# Patient Record
Sex: FEMALE | Race: WHITE | Employment: FULL TIME | URBAN - METROPOLITAN AREA
[De-identification: names, ages, dates, MRNs, and addresses within clinical notes are randomized per-mention and may not be internally consistent; named-entity substitution may affect disease eponyms.]

---

## 2017-01-11 ENCOUNTER — OFFICE VISIT (OUTPATIENT)
Dept: ORTHOPEDIC SURGERY | Age: 42
End: 2017-01-11

## 2017-01-11 VITALS
WEIGHT: 146 LBS | HEIGHT: 68 IN | BODY MASS INDEX: 22.13 KG/M2 | SYSTOLIC BLOOD PRESSURE: 124 MMHG | DIASTOLIC BLOOD PRESSURE: 90 MMHG

## 2017-01-11 DIAGNOSIS — M24.159 LABRAL TEAR OF HIP, DEGENERATIVE: ICD-10-CM

## 2017-01-11 DIAGNOSIS — M25.551 PAIN OF RIGHT HIP JOINT: Primary | ICD-10-CM

## 2017-01-11 PROCEDURE — 99213 OFFICE O/P EST LOW 20 MIN: CPT | Performed by: ORTHOPAEDIC SURGERY

## 2017-01-11 PROCEDURE — 20611 DRAIN/INJ JOINT/BURSA W/US: CPT | Performed by: INTERNAL MEDICINE

## 2017-01-11 PROCEDURE — 99242 OFF/OP CONSLTJ NEW/EST SF 20: CPT | Performed by: INTERNAL MEDICINE

## 2017-01-12 DIAGNOSIS — R11.0 NAUSEA: Primary | ICD-10-CM

## 2017-01-12 RX ORDER — ONDANSETRON 4 MG/1
4 TABLET, FILM COATED ORAL DAILY PRN
Qty: 20 TABLET | Refills: 0 | OUTPATIENT
Start: 2017-01-12 | End: 2017-08-21

## 2017-01-13 ENCOUNTER — HOSPITAL ENCOUNTER (OUTPATIENT)
Dept: PHYSICAL THERAPY | Age: 42
Discharge: HOME | End: 2017-01-31
Admitting: ORTHOPAEDIC SURGERY

## 2017-01-13 PROBLEM — M24.159 LABRAL TEAR OF HIP, DEGENERATIVE: Status: ACTIVE | Noted: 2017-01-13

## 2017-02-08 ENCOUNTER — OFFICE VISIT (OUTPATIENT)
Dept: ORTHOPEDIC SURGERY | Age: 42
End: 2017-02-08

## 2017-02-08 VITALS — HEIGHT: 68 IN | BODY MASS INDEX: 22.13 KG/M2 | WEIGHT: 146 LBS

## 2017-02-08 DIAGNOSIS — M24.159 LABRAL TEAR OF HIP, DEGENERATIVE: Primary | ICD-10-CM

## 2017-02-08 PROCEDURE — 99213 OFFICE O/P EST LOW 20 MIN: CPT | Performed by: ORTHOPAEDIC SURGERY

## 2017-08-21 ENCOUNTER — OFFICE VISIT (OUTPATIENT)
Dept: FAMILY MEDICINE CLINIC | Age: 42
End: 2017-08-21

## 2017-08-21 VITALS
DIASTOLIC BLOOD PRESSURE: 80 MMHG | BODY MASS INDEX: 21.74 KG/M2 | TEMPERATURE: 97.9 F | SYSTOLIC BLOOD PRESSURE: 118 MMHG | WEIGHT: 143 LBS

## 2017-08-21 DIAGNOSIS — F41.9 ANXIETY: ICD-10-CM

## 2017-08-21 DIAGNOSIS — R30.0 DYSURIA: Primary | ICD-10-CM

## 2017-08-21 DIAGNOSIS — R39.15 URINARY URGENCY: ICD-10-CM

## 2017-08-21 DIAGNOSIS — R31.9 HEMATURIA: ICD-10-CM

## 2017-08-21 LAB
BACTERIA URINE, POC: 0
BILIRUBIN URINE: 0 MG/DL
BLOOD, URINE: NEGATIVE
CASTS URINE, POC: 0
CLARITY: CLEAR
COLOR: YELLOW
CRYSTALS URINE, POC: 0
EPI CELLS URINE, POC: 0
GLUCOSE URINE: 0
KETONES, URINE: NEGATIVE
LEUKOCYTE EST, POC: NORMAL
NITRITE, URINE: NEGATIVE
PH UA: 7.5 (ref 4.5–8)
PROTEIN UA: NEGATIVE
RBC URINE, POC: 0
SPECIFIC GRAVITY UA: 1.01 (ref 1–1.03)
UROBILINOGEN, URINE: NORMAL
WBC URINE, POC: NORMAL
YEAST URINE, POC: 0

## 2017-08-21 PROCEDURE — 81000 URINALYSIS NONAUTO W/SCOPE: CPT | Performed by: PHYSICIAN ASSISTANT

## 2017-08-21 PROCEDURE — 99214 OFFICE O/P EST MOD 30 MIN: CPT | Performed by: PHYSICIAN ASSISTANT

## 2017-08-21 RX ORDER — ALPRAZOLAM 0.5 MG/1
TABLET ORAL
Qty: 30 TABLET | Refills: 2 | Status: SHIPPED | OUTPATIENT
Start: 2017-08-21 | End: 2018-06-25 | Stop reason: SDUPTHER

## 2017-08-21 RX ORDER — CIPROFLOXACIN 500 MG/1
500 TABLET, FILM COATED ORAL 2 TIMES DAILY
Qty: 6 TABLET | Refills: 0 | Status: SHIPPED | OUTPATIENT
Start: 2017-08-21 | End: 2017-08-24

## 2017-08-21 ASSESSMENT — ENCOUNTER SYMPTOMS
SHORTNESS OF BREATH: 0
BACK PAIN: 0
DIARRHEA: 0
VOMITING: 0
NAUSEA: 0
CONSTIPATION: 0
ABDOMINAL PAIN: 0

## 2017-08-23 LAB
ORGANISM: ABNORMAL
URINE CULTURE, ROUTINE: ABNORMAL

## 2017-10-09 ENCOUNTER — OFFICE VISIT (OUTPATIENT)
Dept: ORTHOPEDIC SURGERY | Age: 42
End: 2017-10-09

## 2017-10-09 VITALS — HEIGHT: 68 IN | WEIGHT: 140 LBS | BODY MASS INDEX: 21.22 KG/M2

## 2017-10-09 DIAGNOSIS — S92.502A CLOSED FRACTURE OF FOURTH TOE OF LEFT FOOT, INITIAL ENCOUNTER: Primary | ICD-10-CM

## 2017-10-09 PROCEDURE — 99203 OFFICE O/P NEW LOW 30 MIN: CPT | Performed by: PODIATRIST

## 2017-10-09 NOTE — PROGRESS NOTES
HISTORY OF PRESENT ILLNESS: This is an initial visit for a 65-year-old female with a chief complaint of pain to the left 4th toe. This a work-related injury sustained on 9/20/2017. 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 One of the students pushed a desk over and landed directly onto her toe. There is pain with pressure, especially with walking. This is relieved mainly with getting off of the foot. FAMILY HISTORY:  Documented in chart. SOCIAL HISTORY:  Documented in chart. REVIEW OF SYSTEMS: The patient denies any problems with cardiovascular, pulmonary, gastrointestinal, neurologic, urologic, genitourinary, psychiatric, dermatologic, and HEENT systems. PHYSICAL EXAM:  There is mild edema to the left 4th digit. The majority of the palpable tenderness is at the distal tip of the left 4th digit. She also has numbness to the end of the toe. No open lesions or fracture blisters are noted. The pedal pulses are palpable and the sensation is grossly intact bilateral.  The remainder of the exam is unremarkable. X-RAYS:  Three on weightbearing views of the left foot were reviewed. A non-comminuted slightly displaced fracture of the distal tip of the left 4th digit distal phalanx is noted. ASSESSMENT:  4th digit  fracture, left foot    PLAN:  I educated the patient on the pathology and its treatment options. The x-rays were reviewed with the patient. She will continue with the postop shoe for as long as necessary. She can try regular shoes as tolerated. I demonstrated with 1\" Coban, how to apply a compressive dressing to the toe and foot to manage swelling. Rest, ice, and elevation will be used to reduce pain. Overall activity is to be decreased to control pain. Pain with this activity can be expected for up to 4-6 weeks. Activity can increase as tolerated once the initial pain goes down.     Delayed union and non-union of the fracture was discussed. I extended her light duty restrictions for another 3 weeks. I will see her back in 3 weeks.

## 2017-11-01 ENCOUNTER — OFFICE VISIT (OUTPATIENT)
Dept: ORTHOPEDIC SURGERY | Age: 42
End: 2017-11-01

## 2017-11-01 VITALS
SYSTOLIC BLOOD PRESSURE: 139 MMHG | HEART RATE: 78 BPM | WEIGHT: 139.99 LBS | HEIGHT: 68 IN | BODY MASS INDEX: 21.22 KG/M2 | DIASTOLIC BLOOD PRESSURE: 86 MMHG

## 2017-11-01 DIAGNOSIS — S92.502S: Primary | ICD-10-CM

## 2017-11-01 PROCEDURE — 99213 OFFICE O/P EST LOW 20 MIN: CPT | Performed by: PODIATRIST

## 2017-11-01 RX ORDER — IBUPROFEN 400 MG/1
TABLET ORAL
Refills: 0 | COMMUNITY
Start: 2017-09-21 | End: 2017-11-27

## 2017-11-27 ENCOUNTER — OFFICE VISIT (OUTPATIENT)
Dept: FAMILY MEDICINE CLINIC | Age: 42
End: 2017-11-27

## 2017-11-27 VITALS
HEART RATE: 74 BPM | BODY MASS INDEX: 22.7 KG/M2 | SYSTOLIC BLOOD PRESSURE: 112 MMHG | RESPIRATION RATE: 12 BRPM | OXYGEN SATURATION: 97 % | DIASTOLIC BLOOD PRESSURE: 80 MMHG | WEIGHT: 149.25 LBS

## 2017-11-27 DIAGNOSIS — Z00.00 WELL ADULT EXAM: Primary | ICD-10-CM

## 2017-11-27 DIAGNOSIS — Z23 NEED FOR INFLUENZA VACCINATION: ICD-10-CM

## 2017-11-27 DIAGNOSIS — F51.01 PRIMARY INSOMNIA: ICD-10-CM

## 2017-11-27 PROBLEM — S92.502A CLOSED FRACTURE OF FOURTH TOE OF LEFT FOOT: Status: RESOLVED | Noted: 2017-10-09 | Resolved: 2017-11-27

## 2017-11-27 PROCEDURE — 99396 PREV VISIT EST AGE 40-64: CPT | Performed by: FAMILY MEDICINE

## 2017-11-27 PROCEDURE — 90630 INFLUENZA, QUADV, 18-64 YRS, ID, PF, MICRO INJ, 0.1ML (FLUZONE QUADV, PF): CPT | Performed by: FAMILY MEDICINE

## 2017-11-27 PROCEDURE — 90471 IMMUNIZATION ADMIN: CPT | Performed by: FAMILY MEDICINE

## 2017-11-27 ASSESSMENT — PATIENT HEALTH QUESTIONNAIRE - PHQ9
2. FEELING DOWN, DEPRESSED OR HOPELESS: 0
SUM OF ALL RESPONSES TO PHQ QUESTIONS 1-9: 0
SUM OF ALL RESPONSES TO PHQ9 QUESTIONS 1 & 2: 0
1. LITTLE INTEREST OR PLEASURE IN DOING THINGS: 0

## 2017-11-27 NOTE — PATIENT INSTRUCTIONS
Patient Education        Well Visit, Ages 25 to 48: Care Instructions  Your Care Instructions  Physical exams can help you stay healthy. Your doctor has checked your overall health and may have suggested ways to take good care of yourself. He or she also may have recommended tests. At home, you can help prevent illness with healthy eating, regular exercise, and other steps. Follow-up care is a key part of your treatment and safety. Be sure to make and go to all appointments, and call your doctor if you are having problems. It's also a good idea to know your test results and keep a list of the medicines you take. How can you care for yourself at home? · Reach and stay at a healthy weight. This will lower your risk for many problems, such as obesity, diabetes, heart disease, and high blood pressure. · Get at least 30 minutes of physical activity on most days of the week. Walking is a good choice. You also may want to do other activities, such as running, swimming, cycling, or playing tennis or team sports. Discuss any changes in your exercise program with your doctor. · Do not smoke or allow others to smoke around you. If you need help quitting, talk to your doctor about stop-smoking programs and medicines. These can increase your chances of quitting for good. · Talk to your doctor about whether you have any risk factors for sexually transmitted infections (STIs). Having one sex partner (who does not have STIs and does not have sex with anyone else) is a good way to avoid these infections. · Use birth control if you do not want to have children at this time. Talk with your doctor about the choices available and what might be best for you. · Protect your skin from too much sun. When you're outdoors from 10 a.m. to 4 p.m., stay in the shade or cover up with clothing and a hat with a wide brim. Wear sunglasses that block UV rays.  Even when it's cloudy, put broad-spectrum sunscreen (SPF 30 or higher) on any condoms. For men  · Tests for sexually transmitted infections (STIs). Ask whether you should have tests for STIs. You may be at risk if you have sex with more than one person, especially if you do not wear a condom. · Testicular cancer exam. Ask your doctor whether you should check your testicles regularly. · Prostate exam. Talk to your doctor about whether you should have a blood test (called a PSA test) for prostate cancer. Experts differ on whether and when men should have this test. Some experts suggest it if you are older than 39 and are -American or have a father or brother who got prostate cancer when he was younger than 72. When should you call for help? Watch closely for changes in your health, and be sure to contact your doctor if you have any problems or symptoms that concern you. Where can you learn more? Go to [email or web address] and sign in to your [employer] account. Enter P072 in the [employer] box to learn more about \"Well Visit, Ages 25 to 48: Care Instructions. \"     If you do not have an account, please click on the \"Sign Up Now\" link. Current as of: July 19, 2016  Content Version: 11.3  © 9025-5045 [employer]. Care instructions adapted under license by [facility] ([facility]). If you have questions about a medical condition or this instruction, always ask your healthcare professional. [address]  any warranty or liability for your use of this information.

## 2017-11-27 NOTE — PROGRESS NOTES
History and Physical      Noa Garrison  YOB: 1975    Date of Service:  11/27/2017    Chief Complaint:   Noa Garrison is a 36 y.o. female who presents for complete physical examination. HPI: Pt will like to have her toes look at and discuss sinus issues. Patient been her care of orthopedic specialist for 2 separate issues over the last year. She still having discomfort in her right hip but at this point time does not want pursue any surgical intervention. Notes from orthopedic specialist for review with patient doing a chronic labral tear. She also had a toe fracture of the left foot and she feels this is improved. She was concerned that she has abnormalities of her toenails. Patient has not been exercising much in the last several months. Patient still using Xanax on an intermittent basis for insomnia. She's gone through 60 pills in the last year.     Wt Readings from Last 3 Encounters:   11/27/17 149 lb 4 oz (67.7 kg)   11/01/17 139 lb 15.9 oz (63.5 kg)   10/09/17 140 lb (63.5 kg)     BP Readings from Last 3 Encounters:   11/27/17 112/80   11/01/17 139/86   08/21/17 118/80       Patient Active Problem List   Diagnosis    Anxiety    Pain of right hip joint    Labral tear of hip, degenerative    Closed fracture of fourth toe of left foot       Preventive Care:  Health Maintenance   Topic Date Due    HIV screen  05/16/1989    Flu vaccine (1) 09/01/2017    Lipid screen  11/23/2021    DTaP/Tdap/Td vaccine (2 - Td) 09/08/2024      Hx abnormal PAP: no  Sexual activity: single partner, contraception - none   Self-breast exams: yes  Previous DEXA scan: no  Last eye exam: 2016, normal  Exercise: no regular exercise  Seatbelt use: y  Lipid panel:   Lab Results   Component Value Date    CHOL 216 (H) 11/23/2016    TRIG 105 11/23/2016    HDL 55 11/23/2016    LDLCALC 140 (H) 11/23/2016        Living will:No    Immunization History   Administered Date(s) Administered    Influenza Virus Vaccine 10/25/2010    Influenza, Intradermal, Preservative free 01/10/2013, 11/05/2013, 11/03/2015    Influenza, Intradermal, Quadrivalent, Preservative Free 11/23/2016    PPD Test 05/26/2009    Tdap (Boostrix, Adacel) 09/08/2014       No Known Allergies  Outpatient Prescriptions Marked as Taking for the 11/27/17 encounter (Office Visit) with Paul Gracia MD   Medication Sig Dispense Refill    ALPRAZolam (XANAX) 0.5 MG tablet 1 tab po qhs prn 30 tablet 2       Past Medical History:   Diagnosis Date    Anxiety     Closed fracture of fourth toe of left foot 10/9/2017    IBS (irritable bowel syndrome)     Tobacco use disorder      Past Surgical History:   Procedure Laterality Date    HYSTERECTOMY, VAGINAL  6/2010    OTHER SURGICAL HISTORY      uterine mobilization: fibroids     Family History   Problem Relation Age of Onset    Diabetes Mother     High Blood Pressure Mother     Other Father      Pulmonary embolism, DVT's    Cancer Maternal Aunt      brain    Cancer Maternal Grandmother      brain    No Known Problems Maternal Grandfather     No Known Problems Paternal Grandmother     No Known Problems Paternal Grandfather      Social History     Social History    Marital status:      Spouse name: N/A    Number of children: N/A    Years of education: N/A     Occupational History    Not on file. Social History Main Topics    Smoking status: Former Smoker     Packs/day: 0.50     Quit date: 9/13/2014    Smokeless tobacco: Never Used    Alcohol use 0.0 oz/week      Comment: social    Drug use: No    Sexual activity: Not on file     Other Topics Concern    Not on file     Social History Narrative    No narrative on file       Review of Systems:  A comprehensive review of systems was negative except for what was noted in the HPI.      Physical Exam:   Vitals:    11/27/17 0737   BP: 112/80   Site: Left Arm   Position: Sitting   Cuff Size: Medium Adult   Pulse: 74   Resp: 12   SpO2: 97% PF)    Primary insomnia     OARRS report checked      Pt appears stable & labs ordered. Forms will need to be completed for her insurance plan once her laboratory evaluation is available. Advised no treatment required of the toenails. Maintain Xanax on a when necessary basis. Health maintenance is up-to-date. Patient received counseling on the following healthy behaviors: nutrition and resume exercise     Patient given educational materials     Discussed use, benefit, and side effects of prescribed medications. Barriers to medication compliance addressed. All patient questions answered. Pt voiced understanding. Patient needs RTC in one year or sooner if she needs her Xanax refilled. Please note that this chart was generated using Dragon dictation software. Although every effort was made to ensure the accuracy of this automated transcription, some errors in transcription may have occurred.

## 2017-11-27 NOTE — PROGRESS NOTES
Vaccine Information Sheet, \"Influenza - Inactivated\"  given to Elmer Maldonado, or parent/legal guardian of  Elmer Maldonado and verbalized understanding. Patient responses:    Have you ever had a reaction to a flu vaccine? No  Are you able to eat eggs without adverse effects? Yes  Do you have any current illness? No  Have you ever had Guillian De Kal[place] Syndrome? No    Flu vaccine given per order. Please see immunization tab.

## 2018-06-25 ENCOUNTER — OFFICE VISIT (OUTPATIENT)
Dept: FAMILY MEDICINE CLINIC | Age: 43
End: 2018-06-25

## 2018-06-25 VITALS
HEART RATE: 95 BPM | WEIGHT: 142 LBS | SYSTOLIC BLOOD PRESSURE: 100 MMHG | BODY MASS INDEX: 21.6 KG/M2 | OXYGEN SATURATION: 98 % | DIASTOLIC BLOOD PRESSURE: 72 MMHG

## 2018-06-25 DIAGNOSIS — F51.01 PRIMARY INSOMNIA: ICD-10-CM

## 2018-06-25 DIAGNOSIS — Z11.1 SCREENING-PULMONARY TB: ICD-10-CM

## 2018-06-25 DIAGNOSIS — N95.1 HOT FLASHES DUE TO MENOPAUSE: Primary | ICD-10-CM

## 2018-06-25 PROCEDURE — 99213 OFFICE O/P EST LOW 20 MIN: CPT | Performed by: FAMILY MEDICINE

## 2018-06-25 PROCEDURE — 86580 TB INTRADERMAL TEST: CPT | Performed by: FAMILY MEDICINE

## 2018-06-25 RX ORDER — ALPRAZOLAM 0.5 MG/1
0.5 TABLET ORAL NIGHTLY PRN
Qty: 30 TABLET | Refills: 1 | Status: SHIPPED | OUTPATIENT
Start: 2018-06-25 | End: 2019-05-02 | Stop reason: SDUPTHER

## 2018-06-27 ENCOUNTER — NURSE ONLY (OUTPATIENT)
Dept: FAMILY MEDICINE CLINIC | Age: 43
End: 2018-06-27

## 2018-06-27 LAB
INDURATION: NORMAL
TB SKIN TEST: NEGATIVE

## 2019-05-02 ENCOUNTER — OFFICE VISIT (OUTPATIENT)
Dept: FAMILY MEDICINE CLINIC | Age: 44
End: 2019-05-02
Payer: COMMERCIAL

## 2019-05-02 VITALS
HEIGHT: 68 IN | OXYGEN SATURATION: 98 % | HEART RATE: 82 BPM | DIASTOLIC BLOOD PRESSURE: 70 MMHG | SYSTOLIC BLOOD PRESSURE: 120 MMHG | BODY MASS INDEX: 22.88 KG/M2 | WEIGHT: 151 LBS

## 2019-05-02 DIAGNOSIS — N95.1 HOT FLASHES DUE TO MENOPAUSE: Primary | ICD-10-CM

## 2019-05-02 DIAGNOSIS — F51.01 PRIMARY INSOMNIA: ICD-10-CM

## 2019-05-02 DIAGNOSIS — Z12.39 SCREENING FOR MALIGNANT NEOPLASM OF BREAST: ICD-10-CM

## 2019-05-02 PROCEDURE — 99213 OFFICE O/P EST LOW 20 MIN: CPT | Performed by: FAMILY MEDICINE

## 2019-05-02 RX ORDER — ALPRAZOLAM 0.5 MG/1
0.5 TABLET ORAL NIGHTLY PRN
Qty: 30 TABLET | Refills: 0 | Status: SHIPPED | OUTPATIENT
Start: 2019-05-02 | End: 2021-11-19 | Stop reason: SDUPTHER

## 2019-05-02 RX ORDER — ESTRADIOL 1 MG/1
1 TABLET ORAL DAILY
Qty: 30 TABLET | Refills: 3 | Status: SHIPPED | OUTPATIENT
Start: 2019-05-02 | End: 2019-09-03 | Stop reason: SDUPTHER

## 2019-05-02 ASSESSMENT — PATIENT HEALTH QUESTIONNAIRE - PHQ9
SUM OF ALL RESPONSES TO PHQ QUESTIONS 1-9: 0
2. FEELING DOWN, DEPRESSED OR HOPELESS: 0
1. LITTLE INTEREST OR PLEASURE IN DOING THINGS: 0
SUM OF ALL RESPONSES TO PHQ QUESTIONS 1-9: 0
SUM OF ALL RESPONSES TO PHQ9 QUESTIONS 1 & 2: 0

## 2019-05-02 NOTE — PROGRESS NOTES
Subjective:      Patient ID: Marivel Dobsb is a 39 y.o. female. HPI patient presents today for hot flashes. Here for hot flashes. Seen last June. Tried estroven and non rhythmic music. Lenelkin Hand seemed to be helping until past 2 months and now hot flashes are horrible, waking up at night multiple times, soaking bed sheets, during day every few hours with sx. Sweating, face flushed, etc. Uncomfortable and embarrassing as everyone comments on her red face and chest. States can't take it anymore. Would also like RF on alprazolam, uses very rarely for sleep when can't get brain to turn off, no SE. Patient's medications, allergies, past medical, surgical, social and family histories were reviewed and updated in the EHR as appropriate. Father had blood clots but no one else in family. No hx breast cancer. Review of Systems    Objective:   Physical Exam    Vitals:    05/02/19 1123   BP: 120/70   Site: Left Upper Arm   Position: Sitting   Cuff Size: Medium Adult   Pulse: 82   SpO2: 98%   Weight: 151 lb (68.5 kg)   Height: 5' 7.75\" (1.721 m)     Wt Readings from Last 3 Encounters:   05/02/19 151 lb (68.5 kg)   06/25/18 142 lb (64.4 kg)   11/27/17 149 lb 4 oz (67.7 kg)     Body mass index is 23.13 kg/m². PHQ-9 Total Score: 0 (5/2/2019 11:26 AM)      GEN: Alert and oriented x 4 NAD, affect appropriate and normal appearing weight, well hydrated, well developed. Assessment:          ASSESSMENT AND PLAN:       Andrea Casiano was seen today for menopause. Diagnoses and all orders for this visit:    Hot flashes due to menopause  Trial estrace  Discussed other options for tx such as effexor, clonidine  Discussed SE (blood clots, breast cancer, CAD)   Discussed given no uterus can do estrogen alone which is lower risk  Discussed using lowest dose possible for shortest time possible  Call in few weeks with update on sx  GET MAMMOGRAM!    Primary insomnia  -     ALPRAZolam (XANAX) 0.5 MG tablet;  Take 1 tablet by mouth nightly as needed for Sleep for up to 30 days. Uses very rarely, last filled in June 2018 #30  Controlled Substances Monitoring:     Attestation: The Prescription Monitoring Report for this patient was reviewed today. Andrew Cooper MD)  Chronic Pain Routine Monitoring: No signs of potential drug abuse or diversion identified: otherwise, see note documentation, Possible medication side effects, risk of tolerance/dependence & alternative treatments discussed. Andrew Cooper MD)        Screening for malignant neoplasm of breast  -     LALITO DIGITAL SCREEN W CAD BILATERAL; Future    Other orders  -     estradiol (ESTRACE) 1 MG tablet; Take 1 tablet by mouth daily    More than 50% of today's 15 minute encounter was spent counseling the patient/coordinating care of the patient regarding her hot flashes and treatment options as above  .

## 2019-05-02 NOTE — PATIENT INSTRUCTIONS
Patient Education        esterified estrogens  Pronunciation:  ess TER ih fied ESS ashlynwyatt Barillas  Brand:  Menest  What is the most important information I should know about esterified estrogens? Do not use if you are pregnant. You should not take esterified estrogens if you have any of the following conditions: unusual vaginal bleeding, a blood-clotting disorder, breast cancer (unless you are taking this medicine for breast cancer symptoms), or if you have ever had thyroid cancer or uterine cancer, or a blood clot caused by taking hormones. Esterified estrogens may increase your risk of developing a condition that may lead to uterine cancer. Call your doctor at once if you have any unusual vaginal bleeding while using this medicine. What are esterified estrogens? Estrogen is a female sex hormone produced by the ovaries. Estrogen is necessary for many processes in the body. Esterified estrogens are a man-made mixture of estrogens that are used to treat symptoms of menopause such as hot flashes, vaginal dryness, vaginal burning or irritation, or other hormonal changes in the vagina. This medicine is also used to replace estrogen in women with ovarian failure or other conditions that cause a lack of natural estrogen in the body. In some cases, esterified estrogens is used to treat symptoms of breast cancer in men and women. Esterified estrogens is also used in men to treat symptoms of advanced prostate cancer. Esterified estrogens treats only the symptoms of cancer but does not treat the cancer itself. Esterified estrogens may also be used for purposes not listed in this medication guide. What should I discuss with my healthcare provider before taking esterified estrogens? This medicine can harm an unborn baby or cause birth defects. Do not use if you are pregnant, and tell your doctor right away if you become pregnant.   You should not use esterified estrogens if you are allergic to it, if you are pregnant, or if you have:  · unusual vaginal bleeding that a doctor has not checked;  · a bleeding or blood-clotting disorder;  · breast cancer (unless you are taking esterified estrogens to treat breast cancer symptoms);  · a history of hormone-dependent cancer (such as breast, uterine, ovarian, or thyroid cancer); or  · a history of blood clots caused by taking birth control pills or hormone replacement medicine. Taking this medicine can increase your risk of blood clots, stroke, or heart attack, especially if you have certain other conditions. To make sure esterified estrogens is safe for you, tell your doctor if you have:  · heart disease (coronary artery disease, chest pain, history of heart attack, stroke, or blood clot);  · gallbladder disease;  · a history of depression;  · liver disease;  · a history of fibrocystic breast disease, lumps, nodules, or an abnormal mammogram;  · a family history of breast cancer;  · diabetes;  · kidney disease;  · epilepsy;  · migraines;  · a history of jaundice caused by pregnancy or birth control pills;  · if you have had your uterus removed (hysterectomy);  · if you are pregnant or plan to become pregnant;  · if you have had a heart attack or stroke; or  · if you have ever had a blood clot (especially in your lung or your lower body). The hormones in esterified estrogens can pass into breast milk and may harm a nursing baby. This medicine may also slow breast milk production. Do not use if you are breast feeding a baby. How should I take esterified estrogens? Follow all directions on your prescription label. Do not take this medicine in larger or smaller amounts or for longer than recommended. Esterified estrogens are sometimes taken daily and sometimes taken on a cycled schedule, depending on the condition being treated.   If you take this medicine on a cycled schedule, you will take it daily for several days or weeks and then have 7 to 10 days off the medicine, to mimic your body's natural monthly cycle. If you are taking esterified estrogens to treat cancer, you may be taking the medicine more than once per day. Follow your doctor's dosing instructions very carefully. No matter what your dosing schedule, try to take the medicine at the same time each dosing day. Call your doctor right away if you have any unusual vaginal bleeding. To help lower your risk of uterine cancer, your doctor may also want you to take a progestin medication. Follow all dosing directions carefully. If you need surgery or medical tests or if you will be on bed rest, you may need to stop taking your medicine for a short time. Any doctor or surgeon who treats you should know that you are taking esterified estrogens. Store this medicine at room temperature away from moisture and heat. Read all patient information, medication guides, and instruction sheets provided to you. Ask your doctor or pharmacist if you have any questions. While taking esterified estrogens, you will need to visit your doctor regularly. Self-examine your breasts for lumps on a monthly basis and have regular mammograms. What happens if I miss a dose? Take the missed dose as soon as you remember. Skip the missed dose if it is almost time for your next scheduled dose. Do not take extra medicine to make up the missed dose. Call your doctor for instructions if you forget to start taking esterified estrogens again after the regular time off in your dosing cycle. What happens if I overdose? Seek emergency medical attention or call the Poison Help line at 1-192.950.7681. What should I avoid while taking esterified estrogens? Follow your doctor's instructions about any restrictions on food, beverages, or activity. What are the possible side effects of esterified estrogens? Get emergency medical help if you have signs of an allergic reaction: hives; difficult breathing; swelling of your face, lips, tongue, or throat.   Stop using esterified estrogens and call your doctor at once if you have:  · signs of a stroke --sudden numbness or weakness (especially on one side of the body), sudden severe headache, slurred speech, problems with vision or balance;  · signs of a blood clot in the lung --chest pain, sudden cough, wheezing, rapid breathing, coughing up blood;  · signs of a blood clot in your leg --pain, swelling, warmth, or redness in one or both legs;  · heart attack symptoms --chest pain or pressure, pain spreading to your jaw or shoulder, nausea, sweating;  · liver problems --severe stomach pain, fever, tired feeling, loss of appetite, dark urine, mac-colored stools, jaundice (yellowing of the skin or eyes);  · high levels of calcium in your blood --nausea, vomiting, constipation, increased thirst or urination, muscle weakness, bone pain, confusion, lack of energy, or tired feeling;  · a change in the pattern or severity of migraine headaches;  · swelling in your hands, ankles, or feet;  · a breast lump; or  · severe skin reaction --fever, sore throat, swelling in your face or tongue, burning in your eyes, skin pain followed by a red or purple skin rash that spreads (especially in the face or upper body) and causes blistering and peeling. Common side effects may include:  · light vaginal bleeding or spotting;  · breast pain or tenderness;  · nausea, vomiting, bloating;  · skin color changes, increased facial hair, thinning scalp hair;  · headache, dizziness, mood changes, decreased sex drive;  · vaginal itching or discharge, very light menstrual periods; or  · problems with contact lenses. This is not a complete list of side effects and others may occur. Call your doctor for medical advice about side effects. You may report side effects to FDA at 7-555-FDA-0474. What other drugs will affect esterified estrogens?   Other drugs may interact with esterified estrogens, including prescription and over-the-counter medicines, vitamins, and herbal products. Tell each of your health care providers about all medicines you use now and any medicine you start or stop using. Where can I get more information? Your pharmacist can provide more information about esterified estrogens. Remember, keep this and all other medicines out of the reach of children, never share your medicines with others, and use this medication only for the indication prescribed. Every effort has been made to ensure that the information provided by [address] is accurate, up-to-date, and complete, but no guarantee is made to that effect. Drug information contained herein may be time sensitive. [facility] information has been compiled for use by healthcare practitioners and consumers in the [place] and therefore [facility] does not warrant that uses outside of the [place] are appropriate, unless specifically indicated otherwise. [facility]'s drug information does not endorse drugs, diagnose patients or recommend therapy. [facility] drug information is an informational resource designed to assist licensed healthcare practitioners in caring for their patients and/or to serve consumers viewing this service as a supplement to, and not a substitute for, the expertise, skill, knowledge and judgment of healthcare practitioners. The absence of a warning for a given drug or drug combination in no way should be construed to indicate that the drug or drug combination is safe, effective or appropriate for any given patient. [facility] does not assume any responsibility for any aspect of healthcare administered with the aid of information [facility] provides. The information contained herein is not intended to cover all possible uses, directions, precautions, warnings, drug interactions, allergic reactions, or adverse effects. If you have questions about the drugs you are taking, check with your doctor, nurse or pharmacist.  Copyright 1990-4293 [employer] [address] [place] [address]. Version: 4.02.  Revision date: 4/12/2016. Care instructions adapted under license by [facility] ([facility]). If you have questions about a medical condition or this instruction, always ask your healthcare professional. [address] any warranty or liability for your use of this information.

## 2019-09-03 RX ORDER — ESTRADIOL 1 MG/1
TABLET ORAL
Qty: 30 TABLET | Refills: 6 | Status: SHIPPED | OUTPATIENT
Start: 2019-09-03 | End: 2020-10-05 | Stop reason: SDUPTHER

## 2020-03-04 ENCOUNTER — TELEPHONE (OUTPATIENT)
Dept: FAMILY MEDICINE CLINIC | Age: 45
End: 2020-03-04

## 2020-03-04 NOTE — TELEPHONE ENCOUNTER
Patient states she was a former [facility] patient and once he left, she had an appt Brandy to become his patient. When she spoke with the contact center, they informed her that Sera Real was her PCP, not Brandy. She states that it was not right, she only went to [facility] recently because Brandy was not available and she would like clarification.       Please advise

## 2020-03-04 NOTE — TELEPHONE ENCOUNTER
Certainly I can see her but quite honestly it looks like she has been seeing Anika Mcmullen for well examinations for a number of years. Does she want to see Mikayla Levy for her chronic healthcare problems or does she want to start seeing me?

## 2020-03-06 NOTE — TELEPHONE ENCOUNTER
Patient states she is fine having Krystal Bustillo as a provider since she has seen her many times already.

## 2020-10-05 RX ORDER — ESTRADIOL 1 MG/1
TABLET ORAL
Qty: 30 TABLET | Refills: 0 | Status: SHIPPED | OUTPATIENT
Start: 2020-10-05 | End: 2020-11-13 | Stop reason: SDUPTHER

## 2020-10-05 NOTE — TELEPHONE ENCOUNTER
----- Message from Daryle Organ sent at 10/5/2020  3:44 PM EDT -----  Subject: Message to Provider    QUESTIONS  Information for Provider? Pt requesting refill of estradiol (ESTRACE) 1 MG   tablet as she is experiencing heat flashes again. Taking medication once a   day. Please call in prescription to? [facility] [id number] [place] [facility]   [place] - [address] [phone]  ---------------------------------------------------------------------------  --------------  Katerina MOJICA  What is the best way for the office to contact you? OK to leave message on   voicemail  Preferred Call Back Phone Number? [phone]  ---------------------------------------------------------------------------  --------------  SCRIPT ANSWERS  Relationship to Patient?  Self

## 2020-10-07 ENCOUNTER — TELEPHONE (OUTPATIENT)
Dept: FAMILY MEDICINE CLINIC | Age: 45
End: 2020-10-07

## 2020-10-08 NOTE — TELEPHONE ENCOUNTER
Left message to call back. Looks like she is to schedule with her pcp for refills on her medications.

## 2020-11-09 RX ORDER — ESTRADIOL 1 MG/1
TABLET ORAL
Qty: 30 TABLET | Refills: 0 | OUTPATIENT
Start: 2020-11-09

## 2020-11-13 RX ORDER — ESTRADIOL 1 MG/1
TABLET ORAL
Qty: 30 TABLET | Refills: 1 | Status: SHIPPED | OUTPATIENT
Start: 2020-11-13 | End: 2020-12-09 | Stop reason: SDUPTHER

## 2020-12-09 RX ORDER — ESTRADIOL 1 MG/1
TABLET ORAL
Qty: 90 TABLET | Refills: 0 | Status: SHIPPED | OUTPATIENT
Start: 2020-12-09 | End: 2021-03-04

## 2020-12-16 ENCOUNTER — OFFICE VISIT (OUTPATIENT)
Dept: FAMILY MEDICINE CLINIC | Age: 45
End: 2020-12-16
Payer: COMMERCIAL

## 2020-12-16 VITALS
SYSTOLIC BLOOD PRESSURE: 140 MMHG | HEART RATE: 80 BPM | TEMPERATURE: 98.5 F | WEIGHT: 150 LBS | HEIGHT: 68 IN | BODY MASS INDEX: 22.73 KG/M2 | DIASTOLIC BLOOD PRESSURE: 90 MMHG

## 2020-12-16 PROCEDURE — 99396 PREV VISIT EST AGE 40-64: CPT | Performed by: PHYSICIAN ASSISTANT

## 2020-12-16 PROCEDURE — 90686 IIV4 VACC NO PRSV 0.5 ML IM: CPT | Performed by: PHYSICIAN ASSISTANT

## 2020-12-16 PROCEDURE — 90471 IMMUNIZATION ADMIN: CPT | Performed by: PHYSICIAN ASSISTANT

## 2020-12-16 RX ORDER — M-VIT,TX,IRON,MINS/CALC/FOLIC 27MG-0.4MG
1 TABLET ORAL DAILY
COMMUNITY
End: 2021-11-19

## 2020-12-16 RX ORDER — ESTRADIOL 1 MG/1
TABLET ORAL
Qty: 90 TABLET | Refills: 0 | Status: CANCELLED | OUTPATIENT
Start: 2020-12-16

## 2020-12-16 ASSESSMENT — ENCOUNTER SYMPTOMS
SHORTNESS OF BREATH: 0
ABDOMINAL PAIN: 0
EYE PAIN: 0
TROUBLE SWALLOWING: 0
COUGH: 0
BACK PAIN: 0
SORE THROAT: 0
DIARRHEA: 0
VOICE CHANGE: 0
CONSTIPATION: 0
CHEST TIGHTNESS: 0

## 2020-12-16 NOTE — PATIENT INSTRUCTIONS
Courtney Olmedodaniel was seen today for annual exam.    Diagnoses and all orders for this visit:    Preventative health care    Hot flashes    Screening, lipid  -     LIPID PANEL; Future    Screening for diabetes mellitus  -     Comprehensive Metabolic Panel    Encounter for screening mammogram for malignant neoplasm of breast  -     LALITO DIGITAL SCREEN W OR WO CAD BILATERAL; Future    Need for immunization against influenza  -     INFLUENZA, QUADV, 3 YRS AND OLDER, IM PF, PREFILL SYR OR SDV, 0.5ML (AFLURIA QUADV, PF)       Get labs, mammogram, flu shot today, refill for Estradiol, return here in a year.

## 2020-12-16 NOTE — PROGRESS NOTES
Subjective:      Patient ID: Alicia Canales is a 54 y.o. female. HPI Patient is here today for her annual exam.     She takes estradiol for hot flashes. She has them severely if she doesn't take it. Had hysterectomy years ago and has been taking it for awhile. She is current with dental exam. She has not been to the eye doctor in a long time. She needs a mammogram and routine labs. She sleeps \"ok\" but wakes up every few hours. She has a hard time going back to sleep. She has a good appetite. She is eating pretty healthy. She exercises about 4 days a week. Tdap is current. She would like a flu shot. Review of Systems   Constitutional: Negative for appetite change, fatigue and unexpected weight change. HENT: Negative for congestion, dental problem, ear pain, hearing loss, sore throat, trouble swallowing and voice change. Eyes: Negative for pain and visual disturbance. Respiratory: Negative for cough, chest tightness and shortness of breath. Cardiovascular: Negative for chest pain and palpitations. Gastrointestinal: Negative for abdominal pain, constipation and diarrhea. Genitourinary: Negative for difficulty urinating. Musculoskeletal: Negative for arthralgias, back pain, myalgias and neck pain. Skin: Negative for rash. Neurological: Negative for dizziness, speech difficulty, weakness, numbness and headaches. Hematological: Negative for adenopathy. Psychiatric/Behavioral: Negative for confusion and sleep disturbance. The patient is not nervous/anxious. Objective:   Physical Exam  Vitals signs reviewed. Constitutional:       Appearance: Normal appearance. She is well-developed and well-groomed. HENT:      Head: Normocephalic. Right Ear: Tympanic membrane and ear canal normal.      Left Ear: Tympanic membrane and ear canal normal.      Nose: Nose normal.      Mouth/Throat:      Pharynx: Oropharynx is clear. Uvula midline.    Eyes:      Conjunctiva/sclera: Conjunctivae normal.      Pupils: Pupils are equal, round, and reactive to light. Neck:      Musculoskeletal: Normal range of motion and neck supple. No muscular tenderness. Thyroid: No thyromegaly. Trachea: Trachea normal.   Cardiovascular:      Rate and Rhythm: Normal rate and regular rhythm. Chest Wall: PMI is not displaced. Pulses: Normal pulses. Heart sounds: Normal heart sounds. Pulmonary:      Effort: Pulmonary effort is normal.      Breath sounds: Normal breath sounds. Abdominal:      General: Abdomen is flat. Bowel sounds are normal.      Palpations: Abdomen is soft. Tenderness: There is no abdominal tenderness. There is no guarding or rebound. Hernia: No hernia is present. Musculoskeletal: Normal range of motion. Cervical back: Normal.      Thoracic back: Normal.      Lumbar back: Normal.   Lymphadenopathy:      Cervical: No cervical adenopathy. Skin:     General: Skin is warm and dry. Findings: No rash. Neurological:      Mental Status: She is alert and oriented to person, place, and time. Cranial Nerves: No cranial nerve deficit. Sensory: No sensory deficit. Gait: Gait normal.   Psychiatric:         Attention and Perception: Attention normal.         Mood and Affect: Mood normal.         Speech: Speech normal.         Behavior: Behavior normal. Behavior is cooperative. Assessment:      [facility] was seen today for annual exam.    Diagnoses and all orders for this visit:    Preventative health care    Hot flashes    Screening, lipid  -     LIPID PANEL; Future    Screening for diabetes mellitus  -     Comprehensive Metabolic Panel    Encounter for screening mammogram for malignant neoplasm of breast  -     LALITO DIGITAL SCREEN W OR WO CAD BILATERAL;  Future    Need for immunization against influenza  -     INFLUENZA, QUADV, 3 YRS AND OLDER, IM PF, PREFILL SYR OR SDV, 0.5ML (AFLURIA QUADV, PF)             Plan:      Flu shot today, get routine labs, mammogram, return here in a year. BP came down a bit but not normal. She will monitor BP at home and let me know.          Brigid Ramon

## 2020-12-16 NOTE — PROGRESS NOTES
Vaccine Information Sheet, \"Influenza - Inactivated\"  given to Jeb Harry, or parent/legal guardian of  Jeb Harry and verbalized understanding. Patient responses:    Have you ever had a reaction to a flu vaccine? No  Do you have any current illness? No  Have you ever had Guillian Star Syndrome? No  Do you have a serious allergy to any of the follow: Neomycin, Polymyxin, Thimerosal, eggs or egg products? No    Flu vaccine given per order. Please see immunization tab. Risks and benefits explained. Current VIS given.

## 2021-02-08 ENCOUNTER — HOSPITAL ENCOUNTER (OUTPATIENT)
Dept: MAMMOGRAPHY | Age: 46
Discharge: HOME | End: 2021-02-08
Payer: COMMERCIAL

## 2021-02-08 DIAGNOSIS — Z12.31 ENCOUNTER FOR SCREENING MAMMOGRAM FOR MALIGNANT NEOPLASM OF BREAST: ICD-10-CM

## 2021-02-08 PROCEDURE — 77067 SCR MAMMO BI INCL CAD: CPT

## 2021-02-24 DIAGNOSIS — Z13.220 SCREENING, LIPID: ICD-10-CM

## 2021-02-24 LAB
A/G RATIO: 1.5 (ref 1.1–2.2)
ALBUMIN SERPL-MCNC: 4.1 G/DL (ref 3.4–5)
ALP BLD-CCNC: 65 U/L (ref 40–129)
ALT SERPL-CCNC: 16 U/L (ref 10–40)
ANION GAP SERPL CALCULATED.3IONS-SCNC: 10 MMOL/L (ref 3–16)
AST SERPL-CCNC: 27 U/L (ref 15–37)
BILIRUB SERPL-MCNC: 0.8 MG/DL (ref 0–1)
BUN BLDV-MCNC: 10 MG/DL (ref 7–20)
CALCIUM SERPL-MCNC: 8.9 MG/DL (ref 8.3–10.6)
CHLORIDE BLD-SCNC: 104 MMOL/L (ref 99–110)
CHOLESTEROL, TOTAL: 215 MG/DL (ref 0–199)
CO2: 27 MMOL/L (ref 21–32)
CREAT SERPL-MCNC: 0.8 MG/DL (ref 0.6–1.1)
GFR AFRICAN AMERICAN: >60
GFR NON-AFRICAN AMERICAN: >60
GLOBULIN: 2.8 G/DL
GLUCOSE BLD-MCNC: 95 MG/DL (ref 70–99)
HDLC SERPL-MCNC: 73 MG/DL (ref 40–60)
LDL CHOLESTEROL CALCULATED: 116 MG/DL
POTASSIUM SERPL-SCNC: 4.4 MMOL/L (ref 3.5–5.1)
SODIUM BLD-SCNC: 141 MMOL/L (ref 136–145)
TOTAL PROTEIN: 6.9 G/DL (ref 6.4–8.2)
TRIGL SERPL-MCNC: 128 MG/DL (ref 0–150)
VLDLC SERPL CALC-MCNC: 26 MG/DL

## 2021-03-04 RX ORDER — ESTRADIOL 1 MG/1
TABLET ORAL
Qty: 90 TABLET | Refills: 3 | Status: SHIPPED | OUTPATIENT
Start: 2021-03-04 | End: 2022-02-11

## 2021-07-14 ENCOUNTER — OFFICE VISIT (OUTPATIENT)
Dept: FAMILY MEDICINE CLINIC | Age: 46
End: 2021-07-14
Payer: COMMERCIAL

## 2021-07-14 VITALS
BODY MASS INDEX: 22.55 KG/M2 | SYSTOLIC BLOOD PRESSURE: 126 MMHG | DIASTOLIC BLOOD PRESSURE: 74 MMHG | OXYGEN SATURATION: 99 % | WEIGHT: 147.2 LBS | TEMPERATURE: 97.7 F | HEART RATE: 76 BPM

## 2021-07-14 DIAGNOSIS — L65.9 HAIR LOSS: Primary | ICD-10-CM

## 2021-07-14 PROCEDURE — 99213 OFFICE O/P EST LOW 20 MIN: CPT | Performed by: PHYSICIAN ASSISTANT

## 2021-07-14 ASSESSMENT — ENCOUNTER SYMPTOMS
BACK PAIN: 0
SORE THROAT: 0
CONSTIPATION: 0
EYE PAIN: 0
COUGH: 0
TROUBLE SWALLOWING: 0
DIARRHEA: 0
ABDOMINAL PAIN: 0
SHORTNESS OF BREATH: 0
CHEST TIGHTNESS: 0
VOICE CHANGE: 0

## 2021-07-14 ASSESSMENT — PATIENT HEALTH QUESTIONNAIRE - PHQ9
SUM OF ALL RESPONSES TO PHQ QUESTIONS 1-9: 0
SUM OF ALL RESPONSES TO PHQ QUESTIONS 1-9: 0
2. FEELING DOWN, DEPRESSED OR HOPELESS: 0
1. LITTLE INTEREST OR PLEASURE IN DOING THINGS: 0
SUM OF ALL RESPONSES TO PHQ9 QUESTIONS 1 & 2: 0
SUM OF ALL RESPONSES TO PHQ QUESTIONS 1-9: 0

## 2021-07-14 NOTE — PATIENT INSTRUCTIONS
Jaz Rikki was seen today for alopecia. Diagnoses and all orders for this visit:    Hair loss  -     CBC Auto Differential  -     Comprehensive Metabolic Panel  -     TSH without Reflex; Future  -     T4; Future  -     T3; Future  -     IRON AND TIBC; Future  -     FERRITIN; Future  -     Testosterone, free, total; Future  -     FOLLICLE STIMULATING HORMONE; Future  -     LUTEINIZING HORMONE; Future  -     ESTROGENS, FRACTIONATED;  Future

## 2021-07-14 NOTE — PROGRESS NOTES
Subjective:      Patient ID: Willim Closs is a 51 y.o. female. HPI Patient is here today due to hair loss for the last 3 months. She has noticed when she showers or brushes it, large clumps come out. Her sister and daughter do her hair and they have noticed as well. She had a hysterectomy about 10 years ago. She colors her hair but not any more than usual.   She did start taking Collagen and Biotin daily once she started noticing hair loss. Review of Systems   Constitutional: Negative for appetite change, fatigue and unexpected weight change. HENT: Negative for congestion, dental problem, ear pain, hearing loss, sore throat, trouble swallowing and voice change. Eyes: Negative for pain and visual disturbance. Respiratory: Negative for cough, chest tightness and shortness of breath. Cardiovascular: Negative for chest pain and palpitations. Gastrointestinal: Negative for abdominal pain, constipation and diarrhea. Endocrine:        Hair loss   Genitourinary: Negative for difficulty urinating. Musculoskeletal: Negative for arthralgias, back pain, myalgias and neck pain. Skin: Negative for rash. Neurological: Negative for dizziness, speech difficulty, weakness, numbness and headaches. Hematological: Negative for adenopathy. Psychiatric/Behavioral: Negative for confusion and sleep disturbance. The patient is not nervous/anxious. Objective:   Physical Exam  Vitals reviewed. Constitutional:       Appearance: Normal appearance. She is well-developed and well-groomed. Neurological:      Mental Status: She is alert and oriented to person, place, and time. Psychiatric:         Attention and Perception: Attention normal.         Mood and Affect: Mood is anxious. Speech: Speech normal.         Behavior: Behavior normal. Behavior is cooperative. Comments: Anxious over losing hair         Assessment:      Bhavani Matson was seen today for alopecia.     Diagnoses and all orders for this visit:    Hair loss  -     CBC Auto Differential  -     Comprehensive Metabolic Panel  -     TSH without Reflex; Future  -     T4; Future  -     T3; Future  -     IRON AND TIBC; Future  -     FERRITIN; Future  -     Testosterone, free, total; Future  -     FOLLICLE STIMULATING HORMONE; Future  -     LUTEINIZING HORMONE; Future  -     ESTROGENS, FRACTIONATED; Future             Plan:      Get labs.         Francisco [place]

## 2021-07-16 DIAGNOSIS — L65.9 HAIR LOSS: ICD-10-CM

## 2021-07-16 LAB
A/G RATIO: 1.9 (ref 1.1–2.2)
ALBUMIN SERPL-MCNC: 4.4 G/DL (ref 3.4–5)
ALP BLD-CCNC: 64 U/L (ref 40–129)
ALT SERPL-CCNC: 15 U/L (ref 10–40)
ANION GAP SERPL CALCULATED.3IONS-SCNC: 12 MMOL/L (ref 3–16)
AST SERPL-CCNC: 19 U/L (ref 15–37)
BASOPHILS ABSOLUTE: 0 K/UL (ref 0–0.2)
BASOPHILS RELATIVE PERCENT: 0.5 %
BILIRUB SERPL-MCNC: 0.7 MG/DL (ref 0–1)
BUN BLDV-MCNC: 9 MG/DL (ref 7–20)
CALCIUM SERPL-MCNC: 8.8 MG/DL (ref 8.3–10.6)
CHLORIDE BLD-SCNC: 104 MMOL/L (ref 99–110)
CO2: 24 MMOL/L (ref 21–32)
CREAT SERPL-MCNC: 0.6 MG/DL (ref 0.6–1.1)
EOSINOPHILS ABSOLUTE: 0.1 K/UL (ref 0–0.6)
EOSINOPHILS RELATIVE PERCENT: 1.4 %
FERRITIN: 424.8 NG/ML (ref 15–150)
FOLLICLE STIMULATING HORMONE: 10.5 MIU/ML
GFR AFRICAN AMERICAN: >60
GFR NON-AFRICAN AMERICAN: >60
GLOBULIN: 2.3 G/DL
GLUCOSE BLD-MCNC: 92 MG/DL (ref 70–99)
HCT VFR BLD CALC: 38.8 % (ref 36–48)
HEMOGLOBIN: 13.5 G/DL (ref 12–16)
IRON SATURATION: 87 % (ref 15–50)
IRON: 237 UG/DL (ref 37–145)
LUTEINIZING HORMONE: 8.9 MIU/ML
LYMPHOCYTES ABSOLUTE: 1.7 K/UL (ref 1–5.1)
LYMPHOCYTES RELATIVE PERCENT: 30 %
MCH RBC QN AUTO: 34.6 PG (ref 26–34)
MCHC RBC AUTO-ENTMCNC: 34.8 G/DL (ref 31–36)
MCV RBC AUTO: 99.3 FL (ref 80–100)
MONOCYTES ABSOLUTE: 0.4 K/UL (ref 0–1.3)
MONOCYTES RELATIVE PERCENT: 7.6 %
NEUTROPHILS ABSOLUTE: 3.4 K/UL (ref 1.7–7.7)
NEUTROPHILS RELATIVE PERCENT: 60.5 %
PDW BLD-RTO: 12.6 % (ref 12.4–15.4)
PLATELET # BLD: 270 K/UL (ref 135–450)
PMV BLD AUTO: 8.3 FL (ref 5–10.5)
POTASSIUM SERPL-SCNC: 4.4 MMOL/L (ref 3.5–5.1)
RBC # BLD: 3.91 M/UL (ref 4–5.2)
SODIUM BLD-SCNC: 140 MMOL/L (ref 136–145)
T3 TOTAL: 1.36 NG/ML (ref 0.8–2)
T4 TOTAL: 8.2 UG/DL (ref 4.5–10.9)
TOTAL IRON BINDING CAPACITY: 273 UG/DL (ref 260–445)
TOTAL PROTEIN: 6.7 G/DL (ref 6.4–8.2)
TSH SERPL DL<=0.05 MIU/L-ACNC: 2.35 UIU/ML (ref 0.27–4.2)
WBC # BLD: 5.6 K/UL (ref 4–11)

## 2021-07-20 LAB
SEX HORMONE BINDING GLOBULIN: 95 NMOL/L (ref 30–135)
TESTOSTERONE FREE-NONMALE: <1 PG/ML (ref 1.1–5.8)
TESTOSTERONE TOTAL: 7 NG/DL (ref 20–70)

## 2021-07-21 LAB
ESTRADIOL LEVEL: 113.8 PG/ML
ESTROGEN TOTAL: 495.3 PG/ML
ESTRONE: 381.5 PG/ML

## 2021-07-22 DIAGNOSIS — R79.89 LOW TESTOSTERONE LEVEL IN FEMALE: Primary | ICD-10-CM

## 2021-07-22 DIAGNOSIS — E61.1 IRON DEFICIENCY: ICD-10-CM

## 2021-07-22 DIAGNOSIS — R79.0 LOW FERRITIN LEVEL: Primary | ICD-10-CM

## 2021-07-23 DIAGNOSIS — R79.0 ABNORMAL SERUM IRON LEVEL: ICD-10-CM

## 2021-07-23 DIAGNOSIS — R79.89 ELEVATED FERRITIN LEVEL: ICD-10-CM

## 2021-07-23 DIAGNOSIS — R79.89 ELEVATED FERRITIN LEVEL: Primary | ICD-10-CM

## 2021-07-23 LAB — TRANSFERRIN: 248 MG/DL (ref 200–360)

## 2021-08-19 ENCOUNTER — TELEPHONE (OUTPATIENT)
Dept: FAMILY MEDICINE CLINIC | Age: 46
End: 2021-08-19

## 2021-08-19 ENCOUNTER — OFFICE VISIT (OUTPATIENT)
Dept: FAMILY MEDICINE CLINIC | Age: 46
End: 2021-08-19
Payer: COMMERCIAL

## 2021-08-19 VITALS
TEMPERATURE: 98.4 F | OXYGEN SATURATION: 98 % | WEIGHT: 147 LBS | HEIGHT: 67 IN | BODY MASS INDEX: 23.07 KG/M2 | HEART RATE: 110 BPM

## 2021-08-19 DIAGNOSIS — J01.10 ACUTE NON-RECURRENT FRONTAL SINUSITIS: Primary | ICD-10-CM

## 2021-08-19 DIAGNOSIS — L65.9 THINNING HAIR: Primary | ICD-10-CM

## 2021-08-19 DIAGNOSIS — B37.0 THRUSH: ICD-10-CM

## 2021-08-19 DIAGNOSIS — J34.89 LESION OF NASAL CAVITY: ICD-10-CM

## 2021-08-19 DIAGNOSIS — E83.19 IRON OVERLOAD: ICD-10-CM

## 2021-08-19 PROCEDURE — 99213 OFFICE O/P EST LOW 20 MIN: CPT | Performed by: NURSE PRACTITIONER

## 2021-08-19 RX ORDER — AMOXICILLIN AND CLAVULANATE POTASSIUM 875; 125 MG/1; MG/1
1 TABLET, FILM COATED ORAL 2 TIMES DAILY
Qty: 20 TABLET | Refills: 0 | Status: SHIPPED | OUTPATIENT
Start: 2021-08-19 | End: 2021-11-19

## 2021-08-19 SDOH — ECONOMIC STABILITY: FOOD INSECURITY: WITHIN THE PAST 12 MONTHS, YOU WORRIED THAT YOUR FOOD WOULD RUN OUT BEFORE YOU GOT MONEY TO BUY MORE.: NEVER TRUE

## 2021-08-19 SDOH — ECONOMIC STABILITY: FOOD INSECURITY: WITHIN THE PAST 12 MONTHS, THE FOOD YOU BOUGHT JUST DIDN'T LAST AND YOU DIDN'T HAVE MONEY TO GET MORE.: NEVER TRUE

## 2021-08-19 ASSESSMENT — SOCIAL DETERMINANTS OF HEALTH (SDOH): HOW HARD IS IT FOR YOU TO PAY FOR THE VERY BASICS LIKE FOOD, HOUSING, MEDICAL CARE, AND HEATING?: NOT HARD AT ALL

## 2021-08-19 NOTE — PROGRESS NOTES
Kym Neil  : 1975  Encounter date: 2021    This samson 51 y.o. female who presents with  Chief Complaint   Patient presents with    Cough       History of present illness:    HPI Pt is 51year old [facility] on tongue, sinus pressure and congestion x 3 weeks. Pt denies COVID vaccination. Denies known exposure, no fevers, cough or abdominal.  Pt denies recently being on antibiotics. Reports when she gets sick develops sore on inside of R nostril, has been treated in past.  Does report vaping and has taken a lot of antihistamines. Pt also concerned about ongoing hairloss and recent elevated iron levels, will recheck and possible hematology referral.    Current Outpatient Medications on File Prior to Visit   Medication Sig Dispense Refill    estradiol (ESTRACE) 1 MG tablet TAKE 1 TABLET BY MOUTH EVERY DAY 90 tablet 3    Multiple Vitamins-Minerals (THERAPEUTIC MULTIVITAMIN-MINERALS) tablet Take 1 tablet by mouth daily       No current facility-administered medications on file prior to visit.       No Known Allergies  Past Medical History:   Diagnosis Date    IBS (irritable bowel syndrome)     Primary insomnia       Past Surgical History:   Procedure Laterality Date    HYSTERECTOMY, VAGINAL  2010    OTHER SURGICAL HISTORY      uterine mobilization: fibroids      Family History   Problem Relation Age of Onset    Diabetes Mother     High Blood Pressure Mother     Other Father         Pulmonary embolism, DVT's    Cancer Maternal Aunt         brain    Cancer Maternal Grandmother         brain    No Known Problems Maternal Grandfather     No Known Problems Paternal Grandmother     No Known Problems Paternal Grandfather     No Known Problems Sister       Social History     Tobacco Use    Smoking status: Former Smoker     Packs/day: 0.50     Quit date: 2014     Years since quittin.9    Smokeless tobacco: Never Used   Substance Use Topics    Alcohol use: Not Currently     Alcohol/week: 0.0 standard drinks     Comment: hasn't in many months        Review of Systems    Objective:    Pulse 110   Temp 98.4 °F (36.9 °C) (Temporal)   Ht 5' 7\" (1.702 m)   Wt 147 lb (66.7 kg)   SpO2 98%   BMI 23.02 kg/m²   Weight: 147 lb (66.7 kg)     BP Readings from Last 3 Encounters:   07/14/21 126/74   12/16/20 (!) 140/90   05/02/19 120/70     Wt Readings from Last 3 Encounters:   08/19/21 147 lb (66.7 kg)   07/14/21 147 lb 3.2 oz (66.8 kg)   12/16/20 150 lb (68 kg)     BMI Readings from Last 3 Encounters:   08/19/21 23.02 kg/m²   07/14/21 22.55 kg/m²   12/16/20 22.98 kg/m²       Physical Exam  Vitals reviewed. Constitutional:       Appearance: Normal appearance. She is well-developed. HENT:      Nose: Nasal tenderness and congestion present. Left Turbinates: Enlarged and swollen. Right Sinus: Maxillary sinus tenderness and frontal sinus tenderness present. Left Sinus: Maxillary sinus tenderness and frontal sinus tenderness present. Comments: Sore on inside of left nostril  Cardiovascular:      Rate and Rhythm: Normal rate and regular rhythm. Heart sounds: Normal heart sounds. No murmur heard. Pulmonary:      Effort: Pulmonary effort is normal.      Breath sounds: Normal breath sounds. Skin:     General: Skin is warm and dry. Neurological:      Mental Status: She is alert and oriented to person, place, and time. Psychiatric:      Comments: anxious         Assessment/Plan    1. Acute non-recurrent frontal sinusitis  Advised OTC pain relievers  Probiotics  - amoxicillin-clavulanate (AUGMENTIN) 875-125 MG per tablet; Take 1 tablet by mouth 2 times daily  Dispense: 20 tablet; Refill: 0    2. Thrush  - nystatin (MYCOSTATIN) 274524 UNIT/ML suspension; Take 5 mLs by mouth 3 times daily as needed (thrush) Retain in mouth as long as possible  Dispense: 75 mL; Refill: 0    3. Lesion of nasal cavity  - mupirocin (BACTROBAN) 2 % ointment;  Apply 3 times daily.  Dispense: 15 g; Refill: 0      Return if symptoms worsen or fail to improve, for unresolved symptoms. This dictation was generated by voice recognition computer software. Although all attempts are made to edit the dictation for accuracy, there may be errors in the transcription that are not intended.

## 2021-08-19 NOTE — TELEPHONE ENCOUNTER
----- Message from Guy Reynoso sent at 8/19/2021  9:26 AM EDT -----  Subject: Appointment Request    Reason for Call: Urgent Cough Cold    QUESTIONS  Type of Appointment? Established Patient  Reason for appointment request? No appointments available during search  Additional Information for Provider? Patient has had allergies for the   past 5 days and she now feels like it has moved up into her sinus. could   pull any avail appts. for today or tomorrow. ---------------------------------------------------------------------------  --------------  Sergio Blocker INFO  What is the best way for the office to contact you? OK to leave message on   voicemail  Preferred Call Back Phone Number? [phone]  ---------------------------------------------------------------------------  --------------  SCRIPT ANSWERS  Relationship to Patient? Self  Are you currently unable to finish sentences due to any difficulty   breathing? No  Are you unable to swallow liquids? No  Are you having fevers (100.4 or greater), chills, or sweats? No  Do you have COPD, asthma or a chronic lung condition? No  Have your symptoms been present for more than 5 days? Yes   Have you been diagnosed with, awaiting test results for, or told that you   are suspected of having COVID-19 (Coronavirus)? (If patient has tested   negative or was tested as a requirement for work, school, or travel and   not based on symptoms, answer no)? No  Do you currently have flu-like symptoms including fever or chills, cough,   shortness of breath, difficulty breathing, or new loss of taste or smell? No  Have you had close contact with someone with COVID-19 in the last 14 days? No  (Service Expert  click yes below to proceed with [facility]ual   Scheduling)?  Yes

## 2021-08-23 ENCOUNTER — TELEPHONE (OUTPATIENT)
Dept: FAMILY MEDICINE CLINIC | Age: 46
End: 2021-08-23

## 2021-08-23 DIAGNOSIS — R79.89 HIGH SERUM FERRITIN: Primary | ICD-10-CM

## 2021-08-23 NOTE — TELEPHONE ENCOUNTER
PT is calling for her results on her bloodwork and wants to know if she will be referred to any specialist.. Please Advise

## 2021-09-29 ENCOUNTER — HOSPITAL ENCOUNTER (OUTPATIENT)
Dept: MRI IMAGING | Age: 46
Discharge: HOME | End: 2021-09-29
Payer: COMMERCIAL

## 2021-09-29 DIAGNOSIS — R79.89 ELEVATED FERRITIN: ICD-10-CM

## 2021-09-29 PROCEDURE — 74183 MRI ABD W/O CNTR FLWD CNTR: CPT

## 2021-10-18 LAB
BUN / CREAT RATIO: 10 RATIO (ref 9–28)
BUN BLDV-MCNC: 8 MG/DL (ref 6–20)
CREAT SERPL-MCNC: 0.8 MG/DL (ref 0.6–1)

## 2021-10-21 ENCOUNTER — HOSPITAL ENCOUNTER (OUTPATIENT)
Dept: CT IMAGING | Age: 46
Discharge: HOME | End: 2021-10-21
Payer: COMMERCIAL

## 2021-10-21 DIAGNOSIS — N28.89 OTHER SPECIFIED DISORDERS OF KIDNEY AND URETER: ICD-10-CM

## 2021-10-21 PROCEDURE — 74178 CT ABD&PLV WO CNTR FLWD CNTR: CPT

## 2021-10-21 PROCEDURE — 6360000004 HC RX CONTRAST MEDICATION: Performed by: UROLOGY

## 2021-10-21 RX ADMIN — IOPAMIDOL 75 ML: 755 INJECTION, SOLUTION INTRAVENOUS at 10:27

## 2021-11-12 ENCOUNTER — TELEPHONE (OUTPATIENT)
Dept: FAMILY MEDICINE CLINIC | Age: 46
End: 2021-11-12

## 2021-11-12 NOTE — TELEPHONE ENCOUNTER
----- Message from [facility] [place], [place] sent at 11/12/2021 12:33 PM EST -----  Subject: Message to Provider    QUESTIONS  Information for Provider? Patient will be in on 11/19 and is asking about   getting her #1 covid vaccine. Please call to discuss if can get on Appt   date with pcp office  ---------------------------------------------------------------------------  --------------  [address]  What is the best way for the office to contact you? OK to leave message on   voicemail  Preferred Call Back Phone Number? [phone]  ---------------------------------------------------------------------------  --------------  SCRIPT ANSWERS  Relationship to Patient?  Self

## 2021-11-19 ENCOUNTER — OFFICE VISIT (OUTPATIENT)
Dept: FAMILY MEDICINE CLINIC | Age: 46
End: 2021-11-19
Payer: COMMERCIAL

## 2021-11-19 VITALS
OXYGEN SATURATION: 98 % | SYSTOLIC BLOOD PRESSURE: 138 MMHG | BODY MASS INDEX: 23.23 KG/M2 | HEIGHT: 67 IN | WEIGHT: 148 LBS | HEART RATE: 81 BPM | TEMPERATURE: 96.9 F | DIASTOLIC BLOOD PRESSURE: 82 MMHG

## 2021-11-19 DIAGNOSIS — Z01.818 PREOP EXAMINATION: Primary | ICD-10-CM

## 2021-11-19 DIAGNOSIS — F41.9 ANXIETY: ICD-10-CM

## 2021-11-19 DIAGNOSIS — C64.1 MALIGNANT NEOPLASM OF RIGHT KIDNEY (HCC): ICD-10-CM

## 2021-11-19 PROCEDURE — 99242 OFF/OP CONSLTJ NEW/EST SF 20: CPT | Performed by: PHYSICIAN ASSISTANT

## 2021-11-19 RX ORDER — ALPRAZOLAM 0.5 MG/1
0.5 TABLET ORAL NIGHTLY PRN
Qty: 30 TABLET | Refills: 1 | Status: SHIPPED | OUTPATIENT
Start: 2021-11-19 | End: 2021-12-19

## 2021-11-19 RX ORDER — UBIDECARENONE 75 MG
1000 CAPSULE ORAL DAILY
COMMUNITY
End: 2022-08-04

## 2021-11-19 NOTE — PATIENT INSTRUCTIONS
Radha Richard was seen today for pre-op exam.    Diagnoses and all orders for this visit:    Preop examination    Malignant neoplasm of right kidney ([facility] [address]    Anxiety  -     ALPRAZolam (XANAX) 0.5 MG tablet; Take 1 tablet by mouth nightly as needed for Sleep for up to 30 days. Cleared for surgery.

## 2021-11-19 NOTE — PROGRESS NOTES
Preoperative Consultation    Paul Riley  YOB: 1975    This patient presents to the office today for a preoperative consultation at the request of surgeon, Dr. Natalia Angelucci, who plans on performing partial right nephrectomy on  at [facility].    She was diagnosed with hemachromatosis and hematologist ordered a ct scan that showed right renal mass that ended up being cancer. She has no other significant medical conditions that would hinder her from having surgery.      Planned anesthesia: General   Known anesthesia problems: None   Bleeding risk: No recent or remote history of abnormal bleeding  Personal or FH of DVT/PE: Yes - father- DVT      Patient Active Problem List   Diagnosis    Primary insomnia    Pain of right hip joint    Labral tear of hip, degenerative     Past Surgical History:   Procedure Laterality Date    HYSTERECTOMY, VAGINAL  2010    OTHER SURGICAL HISTORY      uterine mobilization: fibroids       No Known Allergies  Outpatient Medications Marked as Taking for the 21 encounter (Office Visit) with GONZALO Coyne   Medication Sig Dispense Refill    Calcium Carbonate Antacid (CALCIUM CARBONATE PO) Calcium Carbonate Oral        Active      vitamin B-12 (CYANOCOBALAMIN) 100 MCG tablet Take 50 mcg by mouth daily      estradiol (ESTRACE) 1 MG tablet TAKE 1 TABLET BY MOUTH EVERY DAY 90 tablet 3       Social History     Tobacco Use    Smoking status: Former Smoker     Packs/day: 0.50     Quit date: 2014     Years since quittin.1    Smokeless tobacco: Never Used   Substance Use Topics    Alcohol use: Not Currently     Alcohol/week: 0.0 standard drinks     Comment: hasn't in many months     Family History   Problem Relation Age of Onset    Diabetes Mother     High Blood Pressure Mother     Other Father         Pulmonary embolism, DVT's    Cancer Maternal Aunt         brain    Cancer Maternal Grandmother         brain    No Known Problems Maternal Grandfather     No Known Problems Paternal Grandmother     No Known Problems Paternal Grandfather     No Known Problems Sister        Review of Systems  A comprehensive review of systems was negative except for what was noted in the HPI. Physical Exam   /82   Pulse 81   Temp 96.9 °F (36.1 °C)   Ht 5' 7\" (1.702 m)   Wt 148 lb (67.1 kg)   SpO2 98%   BMI 23.18 kg/m²   Weight: 148 lb (67.1 kg)   Constitutional: She is oriented to person, place, and time. She appears well-developed and well-nourished. No distress. HENT:   Head: Normocephalic and atraumatic. Mouth/Throat: Uvula is midline, oropharynx is clear and moist and mucous membranes are normal.   Eyes: Conjunctivae and EOM are normal. Pupils are equal, round, and reactive to light. Neck: Trachea normal and normal range of motion. Neck supple. No JVD present. Carotid bruit is not present. No mass and no thyromegaly present. Cardiovascular: Normal rate, regular rhythm, normal heart sounds and intact distal pulses. Exam reveals no gallop and no friction rub. No murmur heard. Pulmonary/Chest: Effort normal and breath sounds normal. No respiratory distress. She has no wheezes. She has no rales. Abdominal: Soft. Normal aorta and bowel sounds are normal. She exhibits no distension and no mass. There is no hepatosplenomegaly. No tenderness. Musculoskeletal: She exhibits no edema and no tenderness. Neurological: She is alert and oriented to person, place, and time. She has normal strength. No cranial nerve deficit or sensory deficit. Coordination and gait normal.   Skin: Skin is warm and dry. No rash noted. No erythema. EKG Interpretation:  NA  Lab Review No       Assessment:       Abdulaziz Patel was seen today for pre-op exam.    Diagnoses and all orders for this visit:    Preop examination    Malignant neoplasm of right kidney (HCC)    Anxiety  -     ALPRAZolam (XANAX) 0.5 MG tablet;  Take 1 tablet by mouth nightly as needed for Sleep for up to 30 days. Plan:     1. Preoperative workup as follows: none  2. Change in medication regimen before surgery: Hold all medications on morning of surgery  3. No contraindications to planned surgery, cleared for surgery  4. Medical decision making of moderate complexity. Controlled substances monitoring: possible medication side effects, risk of tolerance and/or dependence, and alternative treatments discussed, no signs of potential drug abuse or diversion identified and [place]ARRS report reviewed today- activity consistent with treatment plan. Note electronically signed by provider.

## 2021-11-26 NOTE — PROGRESS NOTES
Name_______________________________________Printed:____________________  Date and time of surgery_12/7/2021____1330___________________Arrival Time:__1200  main______________   1. The instructions given regarding when and if a patient needs to stop oral intake prior to surgery varies. Follow the specific instructions you were given                  ___Nothing to eat or to drink after Midnight the night before. __x__Carbo loading or ERAS instructions will be given to select patients-if you have been given those instructions -please do the following                           The evening before your surgery after dinner before midnight drink 40 ounces of gatorade. If you are diabetic use sugar free. The morning of surgery drink 40 ounces of water. This needs to be finished 3 hours prior to your surgery start time. 2. Take the following pills with a small sip of water on the morning of surgery__none_________________________________________________                  Do not take blood pressure medications ending in pril or sartan the rashad prior to surgery or the morning of surgery_   3. Aspirin, Ibuprofen, Advil, Naproxen, Vitamin E and other Anti-inflammatory products and supplements should be stopped for 5 -7days before surgery or as directed by your physician. 4. Check with your Doctor regarding stopping Plavix, Coumadin,Eliquis, Lovenox,Effient,Pradaxa,Xarelto, Fragmin or other blood thinners and follow their instructions. 5. Do not smoke, and do not drink any alcoholic beverages 24 hours prior to surgery. This includes NA Beer. Refrain from the usage of any recreational drugs. 6. You may brush your teeth and gargle the morning of surgery. DO NOT SWALLOW WATER   7. You MUST make arrangements for a responsible adult to stay on site while you are here and take you home after your surgery. You will not be allowed to leave alone or drive yourself home.   It is strongly suggested someone stay with you the first 24 hrs. Your surgery will be cancelled if you do not have a ride home. 8. A parent/legal guardian must accompany a child scheduled for surgery and plan to stay at the hospital until the child is discharged. Please do not bring other children with you. 9. Please wear simple, loose fitting clothing to the hospital.  Claudia Rodriguez not bring valuables (money, credit cards, checkbooks, etc.) Do not wear any makeup (including no eye makeup) or nail polish on your fingers or toes. 10. DO NOT wear any jewelry or piercings on day of surgery. All body piercing jewelry must be removed. 11. If you have ___dentures, they will be removed before going to the OR; we will provide you a container. If you wear ___contact lenses or _x__glasses, they will be removed; please bring a case for them. 12. Please see your family doctor/pediatrician for a history & physical and/or concerning medications. Bring any test results/reports from your physician's office. PCP__________________Phone___________H&P Appt. Date________             13 If you  have a Living Will and Durable Power of  for Healthcare, please bring in a copy. 15. Notify your Surgeon if you develop any illness between now and surgery  time, cough, cold, fever, sore throat, nausea, vomiting, etc.  Please notify your surgeon if you experience dizziness, shortness of breath or blurred vision between now & the time of your surgery             15. DO NOT shave your operative site 96 hours prior to surgery. For face & neck surgery, men may use an electric razor 48 hours prior to surgery. 16. Shower the night before or morning of surgery using an antibacterial soap or as you have been instructed. 17. To provide excellent care visitors will be limited to one in the room at any given time. 18.  Please bring picture ID and insurance card.              19.  Visit our web site for additional information:  [email or web address]              20.During flu season no children under the age of 15 are permitted in the hospital for the safety of all patients. 21. If you take a long acting insulin in the evening only  take half of your usual  dose the night  before your procedure              22. If you use a c-pap please bring DOS if staying overnight,             23.For your convenience [facility] has a pharmacy on site to fill your prescriptions. 24. If you use oxygen and have a portable tank please bring it  with you the DOS             25. Bring a complete list of all your medications with name and dose include any supplements. 26. Other__________________________________________   *Please call pre admission testing if you any further questions   [address] [place]  [phone]   [facility] [address] DIANE HILTON   [phone]           COVID TESTING    _x__ Covid test to be done 3-5 days prior to scheduled surgery -patient aware they are REQUIRED to bring a copy of the negative result DOS-if they receive a positive result to notify their surgeon         If known - indicate where patient is getting covid test done ___12/2/2021  MFF________________________________________________________    ___ Rapid - DOS    ___ Other___________________________________      Annita Houston POLICY(subject to change)    There is a one visitor policy at [facility] for all surgeries and endoscopies. Whether the visitor can stay or will be asked to wait in the car will depend on the current policy and if social distancing can be maintained. The policy is subject to change at any time. Please make sure the visitor has a cell phone that is on,charged and able to accept calls, as this may be the way that the staff communicates with them. Pain management is NO VISITOR policyThe patients ride is expected to remain in the car with a cell phone for communication. If the ride is leaving the hospital grounds please make sure they are back in time for pickup. Have the patient inform the staff on arrival what their rides plans are while the patient is in the facility. At the MAIN there is one visitor allowed. Please note that the visitor policy is subject to change. All above information reviewed with patient in person or by phone. Patient verbalizes understanding. All questions and concerns addressed.                                                                                                  Patient/Rep_patient___________________                                                                                                                                    PRE OP INSTRUCTIONS

## 2021-12-02 ENCOUNTER — HOSPITAL ENCOUNTER (OUTPATIENT)
Age: 46
Discharge: HOME | End: 2021-12-02
Payer: COMMERCIAL

## 2021-12-02 ENCOUNTER — HOSPITAL ENCOUNTER (OUTPATIENT)
Dept: GENERAL RADIOLOGY | Age: 46
Discharge: HOME | End: 2021-12-02
Payer: COMMERCIAL

## 2021-12-02 DIAGNOSIS — Z01.818 PREOP TESTING: ICD-10-CM

## 2021-12-02 DIAGNOSIS — Z01.811 PRE-OP CHEST EXAM: ICD-10-CM

## 2021-12-02 LAB
A/G RATIO: 1.5 (ref 1.1–2.2)
ABO/RH: NORMAL
ALBUMIN SERPL-MCNC: 4.6 G/DL (ref 3.4–5)
ALP BLD-CCNC: 64 U/L (ref 40–129)
ALT SERPL-CCNC: 17 U/L (ref 10–40)
ANION GAP SERPL CALCULATED.3IONS-SCNC: 15 MMOL/L (ref 3–16)
ANTIBODY SCREEN: NORMAL
APTT: 23.4 SEC (ref 26.2–38.6)
AST SERPL-CCNC: 21 U/L (ref 15–37)
BASOPHILS ABSOLUTE: 0 K/UL (ref 0–0.2)
BASOPHILS RELATIVE PERCENT: 0.4 %
BILIRUB SERPL-MCNC: 0.8 MG/DL (ref 0–1)
BUN BLDV-MCNC: 11 MG/DL (ref 7–20)
CALCIUM SERPL-MCNC: 9.1 MG/DL (ref 8.3–10.6)
CHLORIDE BLD-SCNC: 100 MMOL/L (ref 99–110)
CO2: 23 MMOL/L (ref 21–32)
CREAT SERPL-MCNC: 0.7 MG/DL (ref 0.6–1.1)
EKG ATRIAL RATE: 64 BPM
EKG DIAGNOSIS: NORMAL
EKG P AXIS: 27 DEGREES
EKG P-R INTERVAL: 152 MS
EKG Q-T INTERVAL: 424 MS
EKG QRS DURATION: 78 MS
EKG QTC CALCULATION (BAZETT): 437 MS
EKG R AXIS: 4 DEGREES
EKG T AXIS: 28 DEGREES
EKG VENTRICULAR RATE: 64 BPM
EOSINOPHILS ABSOLUTE: 0.1 K/UL (ref 0–0.6)
EOSINOPHILS RELATIVE PERCENT: 1.1 %
GFR AFRICAN AMERICAN: >60
GFR NON-AFRICAN AMERICAN: >60
GLUCOSE BLD-MCNC: 98 MG/DL (ref 70–99)
HCT VFR BLD CALC: 43.9 % (ref 36–48)
HEMOGLOBIN: 14.8 G/DL (ref 12–16)
INR BLD: 0.84 (ref 0.88–1.12)
LYMPHOCYTES ABSOLUTE: 1.8 K/UL (ref 1–5.1)
LYMPHOCYTES RELATIVE PERCENT: 37.5 %
MCH RBC QN AUTO: 34.2 PG (ref 26–34)
MCHC RBC AUTO-ENTMCNC: 33.6 G/DL (ref 31–36)
MCV RBC AUTO: 101.7 FL (ref 80–100)
MONOCYTES ABSOLUTE: 0.3 K/UL (ref 0–1.3)
MONOCYTES RELATIVE PERCENT: 5.8 %
NEUTROPHILS ABSOLUTE: 2.7 K/UL (ref 1.7–7.7)
NEUTROPHILS RELATIVE PERCENT: 55.2 %
PDW BLD-RTO: 12.6 % (ref 12.4–15.4)
PLATELET # BLD: 237 K/UL (ref 135–450)
PLATELET SLIDE REVIEW: ADEQUATE
PMV BLD AUTO: 8.3 FL (ref 5–10.5)
POTASSIUM SERPL-SCNC: 4.4 MMOL/L (ref 3.5–5.1)
PROTHROMBIN TIME: 9.4 SEC (ref 9.9–12.7)
RBC # BLD: 4.32 M/UL (ref 4–5.2)
SARS-COV-2: NOT DETECTED
SLIDE REVIEW: ABNORMAL
SODIUM BLD-SCNC: 138 MMOL/L (ref 136–145)
TOTAL PROTEIN: 7.6 G/DL (ref 6.4–8.2)
WBC # BLD: 4.8 K/UL (ref 4–11)

## 2021-12-02 PROCEDURE — 86900 BLOOD TYPING SEROLOGIC ABO: CPT

## 2021-12-02 PROCEDURE — 85610 PROTHROMBIN TIME: CPT

## 2021-12-02 PROCEDURE — U0005 INFEC AGEN DETEC AMPLI PROBE: HCPCS

## 2021-12-02 PROCEDURE — 71046 X-RAY EXAM CHEST 2 VIEWS: CPT

## 2021-12-02 PROCEDURE — 36415 COLL VENOUS BLD VENIPUNCTURE: CPT

## 2021-12-02 PROCEDURE — 85025 COMPLETE CBC W/AUTO DIFF WBC: CPT

## 2021-12-02 PROCEDURE — U0003 INFECTIOUS AGENT DETECTION BY NUCLEIC ACID (DNA OR RNA); SEVERE ACUTE RESPIRATORY SYNDROME CORONAVIRUS 2 (SARS-COV-2) (CORONAVIRUS DISEASE [COVID-19]), AMPLIFIED PROBE TECHNIQUE, MAKING USE OF HIGH THROUGHPUT TECHNOLOGIES AS DESCRIBED BY CMS-2020-01-R: HCPCS

## 2021-12-02 PROCEDURE — 80053 COMPREHEN METABOLIC PANEL: CPT

## 2021-12-02 PROCEDURE — 93005 ELECTROCARDIOGRAM TRACING: CPT | Performed by: UROLOGY

## 2021-12-02 PROCEDURE — 93010 ELECTROCARDIOGRAM REPORT: CPT | Performed by: INTERNAL MEDICINE

## 2021-12-02 PROCEDURE — 86901 BLOOD TYPING SEROLOGIC RH(D): CPT

## 2021-12-02 PROCEDURE — 86850 RBC ANTIBODY SCREEN: CPT

## 2021-12-02 PROCEDURE — 85730 THROMBOPLASTIN TIME PARTIAL: CPT

## 2021-12-07 ENCOUNTER — ANESTHESIA EVENT (OUTPATIENT)
Dept: OPERATING ROOM | Age: 46
End: 2021-12-07
Payer: COMMERCIAL

## 2021-12-07 ENCOUNTER — ANESTHESIA (OUTPATIENT)
Dept: OPERATING ROOM | Age: 46
End: 2021-12-07
Payer: COMMERCIAL

## 2021-12-07 ENCOUNTER — HOSPITAL ENCOUNTER (OUTPATIENT)
Age: 46
Setting detail: SURGERY ADMIT
Discharge: HOME | End: 2021-12-07
Attending: UROLOGY | Admitting: UROLOGY
Payer: COMMERCIAL

## 2021-12-07 VITALS
TEMPERATURE: 95 F | RESPIRATION RATE: 2 BRPM | SYSTOLIC BLOOD PRESSURE: 205 MMHG | OXYGEN SATURATION: 100 % | DIASTOLIC BLOOD PRESSURE: 101 MMHG

## 2021-12-07 VITALS
RESPIRATION RATE: 15 BRPM | OXYGEN SATURATION: 97 % | BODY MASS INDEX: 21.52 KG/M2 | HEART RATE: 83 BPM | SYSTOLIC BLOOD PRESSURE: 154 MMHG | TEMPERATURE: 97.3 F | DIASTOLIC BLOOD PRESSURE: 94 MMHG | WEIGHT: 142 LBS | HEIGHT: 68 IN

## 2021-12-07 DIAGNOSIS — Z01.818 PREOP TESTING: ICD-10-CM

## 2021-12-07 DIAGNOSIS — Z01.811 PRE-OP CHEST EXAM: Primary | ICD-10-CM

## 2021-12-07 DIAGNOSIS — N28.89 RIGHT RENAL MASS: ICD-10-CM

## 2021-12-07 LAB
ABO/RH: NORMAL
ANTIBODY SCREEN: NORMAL

## 2021-12-07 PROCEDURE — 7100000011 HC PHASE II RECOVERY - ADDTL 15 MIN: Performed by: UROLOGY

## 2021-12-07 PROCEDURE — 6360000002 HC RX W HCPCS: Performed by: FAMILY MEDICINE

## 2021-12-07 PROCEDURE — 2500000003 HC RX 250 WO HCPCS: Performed by: UROLOGY

## 2021-12-07 PROCEDURE — 88342 IMHCHEM/IMCYTCHM 1ST ANTB: CPT

## 2021-12-07 PROCEDURE — 7100000001 HC PACU RECOVERY - ADDTL 15 MIN: Performed by: UROLOGY

## 2021-12-07 PROCEDURE — 7100000000 HC PACU RECOVERY - FIRST 15 MIN: Performed by: UROLOGY

## 2021-12-07 PROCEDURE — S2900 ROBOTIC SURGICAL SYSTEM: HCPCS | Performed by: UROLOGY

## 2021-12-07 PROCEDURE — 2720000010 HC SURG SUPPLY STERILE: Performed by: UROLOGY

## 2021-12-07 PROCEDURE — C9290 INJ, BUPIVACAINE LIPOSOME: HCPCS | Performed by: UROLOGY

## 2021-12-07 PROCEDURE — 88307 TISSUE EXAM BY PATHOLOGIST: CPT

## 2021-12-07 PROCEDURE — 86900 BLOOD TYPING SEROLOGIC ABO: CPT

## 2021-12-07 PROCEDURE — 88341 IMHCHEM/IMCYTCHM EA ADD ANTB: CPT

## 2021-12-07 PROCEDURE — 2580000003 HC RX 258: Performed by: UROLOGY

## 2021-12-07 PROCEDURE — 3600000009 HC SURGERY ROBOT BASE: Performed by: UROLOGY

## 2021-12-07 PROCEDURE — 2500000003 HC RX 250 WO HCPCS: Performed by: FAMILY MEDICINE

## 2021-12-07 PROCEDURE — 2709999900 HC NON-CHARGEABLE SUPPLY: Performed by: UROLOGY

## 2021-12-07 PROCEDURE — 3700000000 HC ANESTHESIA ATTENDED CARE: Performed by: UROLOGY

## 2021-12-07 PROCEDURE — 86901 BLOOD TYPING SEROLOGIC RH(D): CPT

## 2021-12-07 PROCEDURE — 3600000019 HC SURGERY ROBOT ADDTL 15MIN: Performed by: UROLOGY

## 2021-12-07 PROCEDURE — 7100000010 HC PHASE II RECOVERY - FIRST 15 MIN: Performed by: UROLOGY

## 2021-12-07 PROCEDURE — 6360000002 HC RX W HCPCS: Performed by: UROLOGY

## 2021-12-07 PROCEDURE — 6370000000 HC RX 637 (ALT 250 FOR IP): Performed by: FAMILY MEDICINE

## 2021-12-07 PROCEDURE — 3700000001 HC ADD 15 MINUTES (ANESTHESIA): Performed by: UROLOGY

## 2021-12-07 PROCEDURE — 36415 COLL VENOUS BLD VENIPUNCTURE: CPT

## 2021-12-07 PROCEDURE — 2580000003 HC RX 258: Performed by: FAMILY MEDICINE

## 2021-12-07 PROCEDURE — 86850 RBC ANTIBODY SCREEN: CPT

## 2021-12-07 PROCEDURE — 6370000000 HC RX 637 (ALT 250 FOR IP): Performed by: UROLOGY

## 2021-12-07 DEVICE — CLIP SURG L8MM M L POLYGLY ACID POLYGLY LAP ABSRB RAD: Type: IMPLANTABLE DEVICE | Status: FUNCTIONAL

## 2021-12-07 RX ORDER — OXYCODONE HYDROCHLORIDE 5 MG/1
5 TABLET ORAL PRN
Status: COMPLETED | OUTPATIENT
Start: 2021-12-07 | End: 2021-12-07

## 2021-12-07 RX ORDER — HYDROMORPHONE HCL 110MG/55ML
0.25 PATIENT CONTROLLED ANALGESIA SYRINGE INTRAVENOUS EVERY 5 MIN PRN
Status: DISCONTINUED | OUTPATIENT
Start: 2021-12-07 | End: 2021-12-07 | Stop reason: HOSPADM

## 2021-12-07 RX ORDER — DEXAMETHASONE SODIUM PHOSPHATE 4 MG/ML
INJECTION, SOLUTION INTRA-ARTICULAR; INTRALESIONAL; INTRAMUSCULAR; INTRAVENOUS; SOFT TISSUE PRN
Status: DISCONTINUED | OUTPATIENT
Start: 2021-12-07 | End: 2021-12-07 | Stop reason: SDUPTHER

## 2021-12-07 RX ORDER — MAGNESIUM SULFATE HEPTAHYDRATE 500 MG/ML
INJECTION, SOLUTION INTRAMUSCULAR; INTRAVENOUS PRN
Status: DISCONTINUED | OUTPATIENT
Start: 2021-12-07 | End: 2021-12-07 | Stop reason: SDUPTHER

## 2021-12-07 RX ORDER — MAGNESIUM HYDROXIDE 1200 MG/15ML
LIQUID ORAL CONTINUOUS PRN
Status: COMPLETED | OUTPATIENT
Start: 2021-12-07 | End: 2021-12-07

## 2021-12-07 RX ORDER — SUCCINYLCHOLINE CHLORIDE 20 MG/ML
INJECTION INTRAMUSCULAR; INTRAVENOUS PRN
Status: DISCONTINUED | OUTPATIENT
Start: 2021-12-07 | End: 2021-12-07 | Stop reason: SDUPTHER

## 2021-12-07 RX ORDER — PROMETHAZINE HYDROCHLORIDE 25 MG/ML
6.25 INJECTION, SOLUTION INTRAMUSCULAR; INTRAVENOUS PRN
Status: DISCONTINUED | OUTPATIENT
Start: 2021-12-07 | End: 2021-12-07 | Stop reason: HOSPADM

## 2021-12-07 RX ORDER — HYDROCODONE BITARTRATE AND ACETAMINOPHEN 5; 325 MG/1; MG/1
1 TABLET ORAL EVERY 6 HOURS PRN
Qty: 12 TABLET | Refills: 0 | Status: SHIPPED | OUTPATIENT
Start: 2021-12-07 | End: 2021-12-10

## 2021-12-07 RX ORDER — ONDANSETRON 2 MG/ML
INJECTION INTRAMUSCULAR; INTRAVENOUS PRN
Status: DISCONTINUED | OUTPATIENT
Start: 2021-12-07 | End: 2021-12-07 | Stop reason: SDUPTHER

## 2021-12-07 RX ORDER — OXYCODONE HYDROCHLORIDE 5 MG/1
10 TABLET ORAL PRN
Status: COMPLETED | OUTPATIENT
Start: 2021-12-07 | End: 2021-12-07

## 2021-12-07 RX ORDER — HYDROMORPHONE HCL 110MG/55ML
0.5 PATIENT CONTROLLED ANALGESIA SYRINGE INTRAVENOUS EVERY 5 MIN PRN
Status: DISCONTINUED | OUTPATIENT
Start: 2021-12-07 | End: 2021-12-07 | Stop reason: HOSPADM

## 2021-12-07 RX ORDER — LABETALOL HYDROCHLORIDE 5 MG/ML
5 INJECTION, SOLUTION INTRAVENOUS EVERY 10 MIN PRN
Status: DISCONTINUED | OUTPATIENT
Start: 2021-12-07 | End: 2021-12-07 | Stop reason: HOSPADM

## 2021-12-07 RX ORDER — MAGNESIUM HYDROXIDE 1200 MG/15ML
LIQUID ORAL
Status: COMPLETED | OUTPATIENT
Start: 2021-12-07 | End: 2021-12-07

## 2021-12-07 RX ORDER — BUPIVACAINE HYDROCHLORIDE 5 MG/ML
INJECTION, SOLUTION EPIDURAL; INTRACAUDAL
Status: COMPLETED | OUTPATIENT
Start: 2021-12-07 | End: 2021-12-07

## 2021-12-07 RX ORDER — LIDOCAINE HYDROCHLORIDE 10 MG/ML
0.5 INJECTION, SOLUTION EPIDURAL; INFILTRATION; INTRACAUDAL; PERINEURAL ONCE
Status: DISCONTINUED | OUTPATIENT
Start: 2021-12-07 | End: 2021-12-07 | Stop reason: HOSPADM

## 2021-12-07 RX ORDER — EPHEDRINE SULFATE 50 MG/ML
INJECTION INTRAVENOUS PRN
Status: DISCONTINUED | OUTPATIENT
Start: 2021-12-07 | End: 2021-12-07 | Stop reason: SDUPTHER

## 2021-12-07 RX ORDER — VECURONIUM BROMIDE 1 MG/ML
INJECTION, POWDER, LYOPHILIZED, FOR SOLUTION INTRAVENOUS PRN
Status: DISCONTINUED | OUTPATIENT
Start: 2021-12-07 | End: 2021-12-07 | Stop reason: SDUPTHER

## 2021-12-07 RX ORDER — MIDAZOLAM HYDROCHLORIDE 1 MG/ML
INJECTION INTRAMUSCULAR; INTRAVENOUS PRN
Status: DISCONTINUED | OUTPATIENT
Start: 2021-12-07 | End: 2021-12-07 | Stop reason: SDUPTHER

## 2021-12-07 RX ORDER — PROPOFOL 10 MG/ML
INJECTION, EMULSION INTRAVENOUS PRN
Status: DISCONTINUED | OUTPATIENT
Start: 2021-12-07 | End: 2021-12-07 | Stop reason: SDUPTHER

## 2021-12-07 RX ORDER — AMOXICILLIN 250 MG
1 CAPSULE ORAL 2 TIMES DAILY
Qty: 50 TABLET | Refills: 0 | Status: SHIPPED | OUTPATIENT
Start: 2021-12-07 | End: 2022-08-04

## 2021-12-07 RX ORDER — ACETAMINOPHEN 500 MG
500 TABLET ORAL EVERY 6 HOURS PRN
COMMUNITY

## 2021-12-07 RX ORDER — FENTANYL CITRATE 50 UG/ML
50 INJECTION, SOLUTION INTRAMUSCULAR; INTRAVENOUS EVERY 5 MIN PRN
Status: DISCONTINUED | OUTPATIENT
Start: 2021-12-07 | End: 2021-12-07 | Stop reason: HOSPADM

## 2021-12-07 RX ORDER — MEPERIDINE HYDROCHLORIDE 25 MG/ML
12.5 INJECTION INTRAMUSCULAR; INTRAVENOUS; SUBCUTANEOUS EVERY 5 MIN PRN
Status: DISCONTINUED | OUTPATIENT
Start: 2021-12-07 | End: 2021-12-07 | Stop reason: HOSPADM

## 2021-12-07 RX ORDER — FENTANYL CITRATE 50 UG/ML
INJECTION, SOLUTION INTRAMUSCULAR; INTRAVENOUS PRN
Status: DISCONTINUED | OUTPATIENT
Start: 2021-12-07 | End: 2021-12-07 | Stop reason: SDUPTHER

## 2021-12-07 RX ORDER — ROCURONIUM BROMIDE 10 MG/ML
INJECTION, SOLUTION INTRAVENOUS PRN
Status: DISCONTINUED | OUTPATIENT
Start: 2021-12-07 | End: 2021-12-07 | Stop reason: SDUPTHER

## 2021-12-07 RX ORDER — SODIUM CHLORIDE 9 MG/ML
INJECTION, SOLUTION INTRAVENOUS CONTINUOUS PRN
Status: DISCONTINUED | OUTPATIENT
Start: 2021-12-07 | End: 2021-12-07 | Stop reason: SDUPTHER

## 2021-12-07 RX ORDER — DIPHENHYDRAMINE HYDROCHLORIDE 50 MG/ML
12.5 INJECTION INTRAMUSCULAR; INTRAVENOUS
Status: DISCONTINUED | OUTPATIENT
Start: 2021-12-07 | End: 2021-12-07 | Stop reason: HOSPADM

## 2021-12-07 RX ORDER — LIDOCAINE HYDROCHLORIDE 20 MG/ML
INJECTION, SOLUTION INFILTRATION; PERINEURAL PRN
Status: DISCONTINUED | OUTPATIENT
Start: 2021-12-07 | End: 2021-12-07 | Stop reason: SDUPTHER

## 2021-12-07 RX ORDER — SODIUM CHLORIDE, SODIUM LACTATE, POTASSIUM CHLORIDE, CALCIUM CHLORIDE 600; 310; 30; 20 MG/100ML; MG/100ML; MG/100ML; MG/100ML
INJECTION, SOLUTION INTRAVENOUS CONTINUOUS
Status: DISCONTINUED | OUTPATIENT
Start: 2021-12-07 | End: 2021-12-07 | Stop reason: HOSPADM

## 2021-12-07 RX ORDER — ROCURONIUM BROMIDE 10 MG/ML
INJECTION, SOLUTION INTRAVENOUS PRN
Status: DISCONTINUED | OUTPATIENT
Start: 2021-12-07 | End: 2021-12-07

## 2021-12-07 RX ORDER — HYDROMORPHONE HCL 110MG/55ML
PATIENT CONTROLLED ANALGESIA SYRINGE INTRAVENOUS PRN
Status: DISCONTINUED | OUTPATIENT
Start: 2021-12-07 | End: 2021-12-07 | Stop reason: SDUPTHER

## 2021-12-07 RX ADMIN — EPHEDRINE SULFATE 10 MG: 50 INJECTION, SOLUTION INTRAVENOUS at 11:35

## 2021-12-07 RX ADMIN — DEXAMETHASONE SODIUM PHOSPHATE 4 MG: 4 INJECTION, SOLUTION INTRAMUSCULAR; INTRAVENOUS at 11:22

## 2021-12-07 RX ADMIN — MAGNESIUM SULFATE HEPTAHYDRATE 1 G: 500 INJECTION, SOLUTION INTRAMUSCULAR; INTRAVENOUS at 12:04

## 2021-12-07 RX ADMIN — SUGAMMADEX 200 MG: 100 INJECTION, SOLUTION INTRAVENOUS at 12:37

## 2021-12-07 RX ADMIN — HYDROMORPHONE HYDROCHLORIDE 1 MG: 2 INJECTION, SOLUTION INTRAMUSCULAR; INTRAVENOUS; SUBCUTANEOUS at 12:16

## 2021-12-07 RX ADMIN — HYDROMORPHONE HYDROCHLORIDE 0.5 MG: 2 INJECTION, SOLUTION INTRAMUSCULAR; INTRAVENOUS; SUBCUTANEOUS at 12:39

## 2021-12-07 RX ADMIN — FENTANYL CITRATE 50 MCG: 50 INJECTION, SOLUTION INTRAMUSCULAR; INTRAVENOUS at 11:08

## 2021-12-07 RX ADMIN — ONDANSETRON 4 MG: 2 INJECTION INTRAMUSCULAR; INTRAVENOUS at 11:22

## 2021-12-07 RX ADMIN — LIDOCAINE HYDROCHLORIDE 100 MG: 20 INJECTION, SOLUTION INFILTRATION; PERINEURAL at 11:10

## 2021-12-07 RX ADMIN — PROPOFOL 180 MG: 10 INJECTION, EMULSION INTRAVENOUS at 11:12

## 2021-12-07 RX ADMIN — CEFAZOLIN 2000 MG: 10 INJECTION, POWDER, FOR SOLUTION INTRAVENOUS at 11:03

## 2021-12-07 RX ADMIN — OXYCODONE 10 MG: 5 TABLET ORAL at 14:50

## 2021-12-07 RX ADMIN — ROCURONIUM BROMIDE 50 MG: 10 INJECTION, SOLUTION INTRAVENOUS at 11:25

## 2021-12-07 RX ADMIN — MIDAZOLAM 2 MG: 1 INJECTION INTRAMUSCULAR; INTRAVENOUS at 11:04

## 2021-12-07 RX ADMIN — HYDROMORPHONE HYDROCHLORIDE 0.5 MG: 2 INJECTION, SOLUTION INTRAMUSCULAR; INTRAVENOUS; SUBCUTANEOUS at 12:27

## 2021-12-07 RX ADMIN — PHENYLEPHRINE HYDROCHLORIDE 100 MCG: 10 INJECTION INTRAVENOUS at 12:17

## 2021-12-07 RX ADMIN — PHENYLEPHRINE HYDROCHLORIDE 100 MCG: 10 INJECTION INTRAVENOUS at 11:31

## 2021-12-07 RX ADMIN — SUCCINYLCHOLINE CHLORIDE 140 MG: 20 INJECTION, SOLUTION INTRAMUSCULAR; INTRAVENOUS at 11:08

## 2021-12-07 RX ADMIN — VECURONIUM BROMIDE 2 MG: 1 INJECTION, POWDER, LYOPHILIZED, FOR SOLUTION INTRAVENOUS at 11:58

## 2021-12-07 RX ADMIN — FENTANYL CITRATE 50 MCG: 50 INJECTION, SOLUTION INTRAMUSCULAR; INTRAVENOUS at 11:11

## 2021-12-07 RX ADMIN — SODIUM CHLORIDE: 9 INJECTION, SOLUTION INTRAVENOUS at 11:22

## 2021-12-07 ASSESSMENT — PULMONARY FUNCTION TESTS
PIF_VALUE: 18
PIF_VALUE: 20
PIF_VALUE: 18
PIF_VALUE: 14
PIF_VALUE: 18
PIF_VALUE: 14
PIF_VALUE: 17
PIF_VALUE: 0
PIF_VALUE: 17
PIF_VALUE: 18
PIF_VALUE: 16
PIF_VALUE: 18
PIF_VALUE: 2
PIF_VALUE: 16
PIF_VALUE: 18
PIF_VALUE: 17
PIF_VALUE: 16
PIF_VALUE: 19
PIF_VALUE: 20
PIF_VALUE: 18
PIF_VALUE: 14
PIF_VALUE: 18
PIF_VALUE: 14
PIF_VALUE: 5
PIF_VALUE: 17
PIF_VALUE: 14
PIF_VALUE: 18
PIF_VALUE: 19
PIF_VALUE: 18
PIF_VALUE: 18
PIF_VALUE: 26
PIF_VALUE: 16
PIF_VALUE: 15
PIF_VALUE: 18
PIF_VALUE: 0
PIF_VALUE: 14
PIF_VALUE: 18
PIF_VALUE: 15
PIF_VALUE: 13
PIF_VALUE: 20
PIF_VALUE: 1
PIF_VALUE: 16
PIF_VALUE: 15
PIF_VALUE: 0
PIF_VALUE: 1
PIF_VALUE: 18
PIF_VALUE: 18
PIF_VALUE: 17
PIF_VALUE: 16
PIF_VALUE: 17
PIF_VALUE: 18
PIF_VALUE: 17
PIF_VALUE: 19
PIF_VALUE: 18
PIF_VALUE: 18
PIF_VALUE: 17
PIF_VALUE: 21
PIF_VALUE: 15
PIF_VALUE: 19
PIF_VALUE: 2
PIF_VALUE: 12
PIF_VALUE: 12
PIF_VALUE: 14
PIF_VALUE: 19
PIF_VALUE: 20
PIF_VALUE: 18
PIF_VALUE: 19
PIF_VALUE: 18
PIF_VALUE: 20
PIF_VALUE: 20
PIF_VALUE: 14
PIF_VALUE: 18
PIF_VALUE: 18
PIF_VALUE: 1
PIF_VALUE: 18
PIF_VALUE: 18
PIF_VALUE: 19
PIF_VALUE: 18
PIF_VALUE: 14
PIF_VALUE: 15
PIF_VALUE: 18
PIF_VALUE: 18
PIF_VALUE: 19
PIF_VALUE: 1
PIF_VALUE: 18
PIF_VALUE: 14
PIF_VALUE: 18
PIF_VALUE: 18
PIF_VALUE: 0
PIF_VALUE: 21
PIF_VALUE: 14
PIF_VALUE: 17
PIF_VALUE: 16
PIF_VALUE: 16
PIF_VALUE: 18
PIF_VALUE: 15
PIF_VALUE: 20
PIF_VALUE: 1
PIF_VALUE: 18
PIF_VALUE: 14
PIF_VALUE: 15
PIF_VALUE: 21
PIF_VALUE: 20
PIF_VALUE: 1
PIF_VALUE: 17
PIF_VALUE: 1
PIF_VALUE: 17
PIF_VALUE: 15

## 2021-12-07 ASSESSMENT — PAIN SCALES - GENERAL
PAINLEVEL_OUTOF10: 7
PAINLEVEL_OUTOF10: 7

## 2021-12-07 ASSESSMENT — PAIN DESCRIPTION - LOCATION: LOCATION: ABDOMEN

## 2021-12-07 ASSESSMENT — PAIN DESCRIPTION - DESCRIPTORS: DESCRIPTORS: BURNING;ACHING

## 2021-12-07 ASSESSMENT — PAIN DESCRIPTION - PAIN TYPE: TYPE: SURGICAL PAIN

## 2021-12-07 ASSESSMENT — PAIN DESCRIPTION - ORIENTATION: ORIENTATION: RIGHT;MID

## 2021-12-07 NOTE — OP NOTE
Urology Operative Report  [facility]    Provider: Jet Ko MD MD Patient ID:  Admission Date: 2021 Name: Jonna Credit Date: 2021  MRN: [id number]   Patient Location: OR/NONE : 1975  Attending: Jet Ko MD Date of Service: 2021  PCP: GONZALO Novoa     Date of Operation: 2021    Preoperative Diagnosis: renal mass on the RIGHT lateral lower pole. Postoperative Diagnosis: same    Procedure:    1. Robotic assisted laparoscopic partial nephrectomy on the RIGHT  2. Intra-operative renal ultrasound, drop in probe, with doppler flow of renal vascular anatomy  3. Layered closure of the abdominal wall    Surgeon:   Jet Ko MD, MD    Anesthesia: General endotracheal anesthesia    Indications: Wojciech Love is a 51 y.o. female who presents for the above named surgery. Informed consent was obtained and the risks, benefits, and details of the procedure were explained to the patient who elected to proceed. Details of Procedure:  After informed consent was obtained, making the patient aware of the risks, benefits and alternatives to the procedure, she was taken back to the surgical suite and positioned in a supine position on the surgical table. SCDs were placed on the lower extremities. General anesthesia was induced, and she was transferred to a left lateral decubitus position. All pressure points were carefully padded. A 8mm incision was made in the right side paramedian location, superior and lateral to the umbilicus. A veress needle was introduced into the abdomen in one attempt and insufflation was obtained. A 8mm trocar was used to enter the abdomen. No adhesions were seen and no injury was noted. After inspecting the abdomen sufficiently we placed 2 robotic 8 mm trocars in the right lower quadrant and one 8mm trocar in the right upper quadrant, triangulated towards the renal hilum.  An assistant 12mm port was placed in the midline. The robot was then docked and the instruments advanced under direct vision. A grasping and locking forceps was advanced through the 5mm port and used to elevate the liver. This allowed exposure to the kidney in the usual retroperitoneal location. The ascending colon was reflected medially by incising the white line of Toldt. The duodenum was gently reflected medially allowing exposure of the IVC and the renal vein. By using the IVC and gonadal vein as landmarks a plane was created directly above the psoas muscle, reflecting the ureter with the kidney, and leaving the gonadal vessel with the IVC. This plane posterior to the kidney on top of the psoas muscle was extended cephalad. The renal hilar vessels were encountered medial to the kidney. The artery and vein were dissected free of investing tissues. Attention was turned to the kidney to identify the tumor. After incising the overlying fat the tumor was able to be appreciated in expected location based on pre-op imaging. The laparoscopic ultrasound probe was used to identify the margins of the tumor and rylee the kidney with cautery. Sutures of 0-stratafix looped with 3-0 stratafix was prepared. The renal artery was clamped and the ultrasound with color doppler flow was used to ensure adequate arterial clamping had been obtained. The tumor was excised sharply placed to the side. The base of the wound was cauterized using monopolar energy. Using 3-0 stratafix the bed of the resection was oversewn. The renal artery clamp was carefully removed. 0-stratafix was used as a running, functionally interrupted, horizontal mattress with lapra-clips on the parenchyma. Hemostasis appeared adequate. All needles were confirmed out. The specimen was placed in a bag. The kidney was replaced within the retroperitoneum using a running 3-0 stratafix. Robotic instruments were removed. The assistant port was extended to allow extraction of the specimen.  This extended incision was closed directly using 0 vicryl on the peritoneum and 0 PDS on the fascia in a interrupted fashion. All wounds were irrigated and skin was closed using 4-0 Monocryl and Dermabond. At the end of the procedure all needle, lap, instrument and sponge counts were correct. The patient tolerated the procedure well, was extubated without issue in the operating room, and was transported to the PACU in stable condition. Findings: There did appear to be an adequate surgical margin. Total clamp time 12min 47sec. Estimated Blood Loss: Approximately 20mL                  Drains: Valentino catheter    Specimens: Right partial kidney    Complications: none apparent           Disposition:  PACU - hemodynamically stable.             Zackery Ramirez MD, MD  12/7/2021

## 2021-12-07 NOTE — DISCHARGE INSTR - DIET

## 2021-12-07 NOTE — PROGRESS NOTES
Pt awake and oriented, vss, abd lap sites x5 CD&I, c/o pain but pt denies needing pain meds, \" wants to see \" weaned to RA, tolerating liquids. Phase 1 discharge criteria met.

## 2021-12-07 NOTE — PROGRESS NOTES
Pt c/o surgical pain 7/10.  10mg oxycodone given per pt request.  Pt ready for discharge. Discharge instructions reviewed with pt and pt . All questions answered, pt and  v/u.

## 2021-12-07 NOTE — ANESTHESIA POSTPROCEDURE EVALUATION
Department of Anesthesiology  Postprocedure Note    Patient: Artie Bueno  MRN: [id number]  YOB: 1975  Date of evaluation: 12/7/2021  Time:  1:44 PM     Procedure Summary     Date: 12/07/21 Room / Location: [address]    Anesthesia Start: 1108 Anesthesia Stop: 7462    Procedure: ROBOTIC LAPAROSCOPIC PARTIAL RIGHT NEPHRECTOMY (Right Abdomen) Diagnosis: (N28.89  RIGHT RENAL MASS)    Surgeons: Marlon Dawson MD Responsible Provider: Vidhya Banks MD    Anesthesia Type: general ASA Status: 2          Anesthesia Type: general    Aguila Phase I: Aguila Score: 8    Aguila Phase II:      Last vitals: Reviewed and per EMR flowsheets.        Anesthesia Post Evaluation    Patient location during evaluation: PACU  Level of consciousness: awake  Complications: no  Multimodal analgesia pain management approach

## 2021-12-07 NOTE — OP NOTE
Urology History and Physical  Inpatient Setting - [facility]    Provider: Chris Buckner MD MD Patient ID:  Admission Date: 2021 Name: Yanet Crespo Date: n/a MRN: [id number]   Patient Location: OR/NONE : 1975  Attending: Chris Buckner MD Date of Service: 2021  PCP: GONZALO Rodriguez     Diagnoses:  RIGHT renal mass    Assessment/Plan:  Continue to the OR as planned for robotic right side partial nephrectomy    All the patients questions were answered in detail. She understands the plan as listed above.                                                                                                                                               _____________________________________________________________    CC: Pre-op    HPI: Priscilla Salgado is a 51 y.o. female. The history and physical exam was reviewed. The patient was seen and examined in pre-op. She had a chance to ask questions which were answered. There has been no interval changes. Plan to continue to the OR    Past Medical History:   She has a past medical history of Hemochromatosis, IBS (irritable bowel syndrome), and Primary insomnia. Past Surgical History:  She has a past surgical history that includes Hysterectomy, vaginal (2010) and other surgical history. Allergies:   No Known Allergies    Social History:  She reports that she quit smoking about 7 years ago. She has a 10.00 pack-year smoking history. She has never used smokeless tobacco. She reports previous alcohol use. She reports that she does not use drugs. Family History:  family history includes Cancer in her maternal aunt and maternal grandmother; Diabetes in her mother; High Blood Pressure in her mother; No Known Problems in her maternal grandfather, paternal grandfather, paternal grandmother, and sister; Other in her father.     Medications:   Scheduled Meds:   lidocaine PF  0.5 mL IntraDERmal Once    ceFAZolin  2,000 mg IntraVENous On Call to OR     Continuous Infusions:   lactated ringers       PRN Meds:    Review of Systems:  Constitutional: Negative for fever    Genitourinary: see HPI  Eyes: negative for sudden change in vision  EENT: no complaints  Cardiovascular: Negative for chest pain  Respiratory: Negative for shortness of breath  Gastrointestinal: Negative for nausea  Musculoskeletal: Negative for back pain   Neurological: Negative for weakness  Psychiatric: Negative for anxiety  Integumentary: Negative for rashes or adenopathy     Physical Exam:  Vitals:    12/07/21 1010   BP: (!) 141/86   Pulse: 80   Resp: 19   Temp: 97.8 °F (36.6 °C)   SpO2: 99%     Constitutional: NAD, well-developed, well-nourished. Cardiovascular: Regular rate. No peripheral edema  Respiratory: Respirations are even and non-labored. No audible breath sounds. Psychiatric: A + O x 3, normal affect. Insight appears intact.      Markel Correia MD, MD  12/7/2021

## 2021-12-07 NOTE — ANESTHESIA PRE PROCEDURE
Department of Anesthesiology  Preprocedure Note       Name:  Irma Jaimes   Age:  51 y.o.  :  1975                                          MRN:  [id number]         Date:  2021      Surgeon: Leydi Valerio):  Yoav Bryson MD    Procedure: Procedure(s):  ROBOTIC LAPAROSCOPIC PARTIAL RIGHT NEPHRECTOMY    Medications prior to admission:   Prior to Admission medications    Medication Sig Start Date End Date Taking? Authorizing Provider   acetaminophen (TYLENOL) 500 MG tablet Take 500 mg by mouth every 6 hours as needed for Pain   Yes Historical Provider, MD   Calcium Carbonate Antacid (CALCIUM CARBONATE PO) Calcium Carbonate Oral        Active   Yes Historical Provider, MD   vitamin B-12 (CYANOCOBALAMIN) 100 MCG tablet Take 1,000 mcg by mouth daily    Yes Historical Provider, MD   estradiol (ESTRACE) 1 MG tablet TAKE 1 TABLET BY MOUTH EVERY DAY 3/4/21  Yes Cordie Bamberger, PA   ALPRAZolam Evetta Boo) 0.5 MG tablet Take 1 tablet by mouth nightly as needed for Sleep for up to 30 days.  21  Cordie Bamberger, PA       Current medications:    Current Facility-Administered Medications   Medication Dose Route Frequency Provider Last Rate Last Admin    lactated ringers infusion   IntraVENous Continuous Yoav Bryson MD        lidocaine PF 1 % injection 0.5 mL  0.5 mL IntraDERmal Once Yoav Bryson MD        ceFAZolin (ANCEF) 2000 mg in dextrose 5 % 50 mL IVPB  2,000 mg IntraVENous On Call to [address] [place] [address] MD [place]        meperidine (DEMEROL) injection 12.5 mg  12.5 mg IntraVENous Q5 Min PRN Danny Miller MD        HYDROmorphone (DILAUDID) injection 0.25 mg  0.25 mg IntraVENous Q5 Min PRN Danny Miller MD        fentaNYL (SUBLIMAZE) injection 50 mcg  50 mcg IntraVENous Q5 Min PRN Danny Miller MD        HYDROmorphone (DILAUDID) injection 0.25 mg  0.25 mg IntraVENous Q5 Min PRN Danny Miller MD        HYDROmorphone (DILAUDID) injection 0.5 mg  0.5 mg IntraVENous Q5 Min PRN Yariel Ovalle MD        oxyCODONE (ROXICODONE) immediate release tablet 5 mg  5 mg Oral PRN Yariel Ovalle MD        Or    oxyCODONE (ROXICODONE) immediate release tablet 10 mg  10 mg Oral PRN Yariel Ovalle MD        promethazine [facility]) injection 6.25 mg  6.25 mg IntraVENous PRN Yariel Ovalle MD        diphenhydrAMINE (BENADRYL) injection 12.5 mg  12.5 mg IntraVENous Once PRN Yariel Ovalle MD        labetalol (NORMODYNE;TRANDATE) injection 5 mg  5 mg IntraVENous Q10 Min PRN Yariel Ovalle MD           Allergies:  No Known Allergies    Problem List:    Patient Active Problem List   Diagnosis Code    Primary insomnia F51.01    Pain of right hip joint M25.551    Labral tear of hip, 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       Past Medical History:        Diagnosis Date    Hemochromatosis     IBS (irritable bowel syndrome)     Primary insomnia        Past Surgical History:        Procedure Laterality Date    HYSTERECTOMY, VAGINAL  2010    OTHER SURGICAL HISTORY      uterine mobilization: fibroids       Social History:    Social History     Tobacco Use    Smoking status: Former Smoker     Packs/day: 0.50     Years: 20.00     Pack years: 10.00     Quit date: 2014     Years since quittin.2    Smokeless tobacco: Never Used   Substance Use Topics    Alcohol use: Not Currently     Alcohol/week: 0.0 standard drinks     Comment: hasn't in many months                                Counseling given: Not Answered      Vital Signs (Current):   Vitals:    21 1444 21 1010   BP:  (!) 141/86   Pulse:  80   Resp:  19   Temp:  97.8 °F (36.6 °C)   TempSrc:  Temporal   SpO2:  99%   Weight: 143 lb (64.9 kg) 142 lb (64.4 kg)   Height: 5' 8\" (1.727 m)                                               BP Readings from Last 3 Encounters:   21 (!) 141/86   21 138/82   21 126/74       NPO Status: Time of last liquid consumption: 0800                        Time of last solid consumption: 1800                        Date of last liquid consumption: 12/07/21                        Date of last solid food consumption: 12/06/21    BMI:   Wt Readings from Last 3 Encounters:   12/07/21 142 lb (64.4 kg)   11/19/21 148 lb (67.1 kg)   08/19/21 147 lb (66.7 kg)     Body mass index is 21.59 kg/m². CBC:   Lab Results   Component Value Date    WBC 4.8 12/02/2021    RBC 4.32 12/02/2021    HGB 14.8 12/02/2021    HCT 43.9 12/02/2021    .7 12/02/2021    RDW 12.6 12/02/2021     12/02/2021       CMP:   Lab Results   Component Value Date     12/02/2021    K 4.4 12/02/2021     12/02/2021    CO2 23 12/02/2021    BUN 11 12/02/2021    CREATININE 0.7 12/02/2021    GFRAA >60 12/02/2021    AGRATIO 1.5 12/02/2021    LABGLOM >60 12/02/2021    GLUCOSE 98 12/02/2021    PROT 7.6 12/02/2021    CALCIUM 9.1 12/02/2021    BILITOT 0.8 12/02/2021    ALKPHOS 64 12/02/2021    AST 21 12/02/2021    ALT 17 12/02/2021       POC Tests: No results for input(s): POCGLU, POCNA, POCK, POCCL, POCBUN, POCHEMO, POCHCT in the last 72 hours.     Coags:   Lab Results   Component Value Date    PROTIME 9.4 12/02/2021    INR 0.84 12/02/2021    APTT 23.4 12/02/2021       HCG (If Applicable): No results found for: PREGTESTUR, PREGSERUM, HCG, HCGQUANT     ABGs: No results found for: PHART, PO2ART, JMZ8XJZ, SWE1LGV, BEART, I4XBRZUG     Type & Screen (If Applicable):  No results found for: LABABO, LABRH    Drug/Infectious Status (If Applicable):  No results found for: HIV, HEPCAB    COVID-19 Screening (If Applicable):   Lab Results   Component Value Date    COVID19 Not Detected 12/02/2021           Anesthesia Evaluation    Airway: Mallampati: II  TM distance: >3 FB   Neck ROM: full  Mouth opening: > = 3 FB Dental:          Pulmonary:                              Cardiovascular:            Rhythm: regular  Rate: normal                    Neuro/Psych:               GI/Hepatic/Renal:   (+) liver disease (hemochromatosis):, renal disease:,           Endo/Other:                     Abdominal:             Vascular: Other Findings:           Anesthesia Plan      general     ASA 2       Induction: intravenous. Anesthetic plan and risks discussed with patient. Plan discussed with CRNA.                   Emma Ferrer MD   12/7/2021

## 2021-12-07 NOTE — H&P
Urology History and Physical  Inpatient Setting - [facility]    Provider: Edwar Holcomb MD MD Patient ID:  Admission Date: 2021 Name: Jef Chan Date: n/a MRN: [id number]   Patient Location: OR/NONE : 1975  Attending: Edwar Holcomb MD Date of Service: 2021  PCP: GONZALO Barry     Diagnoses:  RIGHT renal mass    Assessment/Plan:  Continue to the OR as planned for robotic right side partial nephrectomy    All the patients questions were answered in detail. She understands the plan as listed above.                                                                                                                                               _____________________________________________________________    CC: Pre-op    HPI: Bossman Holt is a 51 y.o. female. The history and physical exam was reviewed. The patient was seen and examined in pre-op. She had a chance to ask questions which were answered. There has been no interval changes. Plan to continue to the OR    Past Medical History:   She has a past medical history of Hemochromatosis, IBS (irritable bowel syndrome), and Primary insomnia. Past Surgical History:  She has a past surgical history that includes Hysterectomy, vaginal (2010) and other surgical history. Allergies:   No Known Allergies    Social History:  She reports that she quit smoking about 7 years ago. She has a 10.00 pack-year smoking history. She has never used smokeless tobacco. She reports previous alcohol use. She reports that she does not use drugs. Family History:  family history includes Cancer in her maternal aunt and maternal grandmother; Diabetes in her mother; High Blood Pressure in her mother; No Known Problems in her maternal grandfather, paternal grandfather, paternal grandmother, and sister; Other in her father.     Medications:   Scheduled Meds:   lidocaine PF  0.5 mL IntraDERmal Once    ceFAZolin  2,000 mg IntraVENous On Call to OR     Continuous Infusions:   lactated ringers       PRN Meds:    Review of Systems:  Constitutional: Negative for fever    Genitourinary: see HPI  Eyes: negative for sudden change in vision  EENT: no complaints  Cardiovascular: Negative for chest pain  Respiratory: Negative for shortness of breath  Gastrointestinal: Negative for nausea  Musculoskeletal: Negative for back pain   Neurological: Negative for weakness  Psychiatric: Negative for anxiety  Integumentary: Negative for rashes or adenopathy     Physical Exam:  Vitals:    12/07/21 1010   BP: (!) 141/86   Pulse: 80   Resp: 19   Temp: 97.8 °F (36.6 °C)   SpO2: 99%     Constitutional: NAD, well-developed, well-nourished. Cardiovascular: Regular rate. No peripheral edema  Respiratory: Respirations are even and non-labored. No audible breath sounds. Psychiatric: A + O x 3, normal affect. Insight appears intact.      Edwar Holcomb MD, MD  12/7/2021

## 2021-12-07 NOTE — PROGRESS NOTES
Pt admitted to PACU, awakening emotional and tearful, denies pain, abd lap sites x5 CD&I, vss, NSR on tele.  Will monitor

## 2021-12-07 NOTE — PROGRESS NOTES
CLINICAL PHARMACY NOTE: MEDS TO BEDS    Total # of Prescriptions Filled: 2   The following medications were delivered to the patient:  · NORCO 5-325 mg tabs  · Stimulant-Lax 8.6-50 tabs    Additional Documentation:    Patient's  signed for 2 Rx upon discharge.

## 2022-02-10 NOTE — TELEPHONE ENCOUNTER
Medication:   Requested Prescriptions     Pending Prescriptions Disp Refills    estradiol (ESTRACE) 1 MG tablet [Pharmacy Med Name: ESTRADIOL 1 MG TABLET] 90 tablet 3     Sig: TAKE 1 TABLET BY MOUTH EVERY DAY      Last Filled:      Patient Phone Number: [phone] (home)     Last appt: 11/19/2021   Next appt: Visit date not found    Last OARRS:   RX Monitoring 11/19/2021   Attestation -   Periodic Controlled Substance Monitoring Possible medication side effects, risk of tolerance/dependence & alternative treatments discussed. ;No signs of potential drug abuse or diversion identified.      PDMP Monitoring:    Last PDMP Bev Burton as Reviewed [facility]):  Review User Review Instant Review Result          Preferred Pharmacy:   [address]  [address] [place] [address]  Phone: [phone] Fax: [phone]

## 2022-02-11 RX ORDER — ESTRADIOL 1 MG/1
TABLET ORAL
Qty: 90 TABLET | Refills: 3 | Status: SHIPPED | OUTPATIENT
Start: 2022-02-11

## 2022-03-11 ENCOUNTER — HOSPITAL ENCOUNTER (OUTPATIENT)
Dept: ULTRASOUND IMAGING | Age: 47
Discharge: HOME | End: 2022-03-11
Payer: COMMERCIAL

## 2022-03-11 DIAGNOSIS — R79.89 ELEVATED FERRITIN: ICD-10-CM

## 2022-03-11 DIAGNOSIS — R10.11 RIGHT UPPER QUADRANT PAIN: ICD-10-CM

## 2022-03-11 PROCEDURE — 76705 ECHO EXAM OF ABDOMEN: CPT

## 2022-03-31 ENCOUNTER — OFFICE VISIT (OUTPATIENT)
Dept: SURGERY | Age: 47
End: 2022-03-31
Payer: COMMERCIAL

## 2022-03-31 VITALS — DIASTOLIC BLOOD PRESSURE: 91 MMHG | WEIGHT: 145 LBS | SYSTOLIC BLOOD PRESSURE: 154 MMHG | BODY MASS INDEX: 22.05 KG/M2

## 2022-03-31 DIAGNOSIS — R10.11 RUQ PAIN: Primary | ICD-10-CM

## 2022-03-31 DIAGNOSIS — K83.8 BILIARY SLUDGE: ICD-10-CM

## 2022-03-31 PROCEDURE — 99243 OFF/OP CNSLTJ NEW/EST LOW 30: CPT | Performed by: SURGERY

## 2022-03-31 ASSESSMENT — ENCOUNTER SYMPTOMS
ABDOMINAL PAIN: 1
DIARRHEA: 1
ALLERGIC/IMMUNOLOGIC NEGATIVE: 1
CONSTIPATION: 1
NAUSEA: 1
EYES NEGATIVE: 1
VOMITING: 1
RESPIRATORY NEGATIVE: 1

## 2022-03-31 NOTE — PROGRESS NOTES
[facility]      PATIENT NAME: Corinne Cohn DATE: 3/31/2022    Reason for Consult:  Abd pain    Requesting Physician / PCP:  Dr. Melita Garcia / Jasmyn Neil. [place], [address]    HISTORY OF PRESENT ILLNESS:              The patient is a 51 y.o. female who presents with RUQ pain, focal, continues, sharp, moderate to severe, with radiation on the side. The pt has had symptoms for the past several weeks. She has had associated symptoms of nausea. Testing has included US. This showed findings of GB sludge. Has had right partial nephrectomy, and has hemochromatosis. No PUD or chronic GI disorders. Past Medical History:        Diagnosis Date    Hemochromatosis     IBS (irritable bowel syndrome)     Primary insomnia        Past Surgical History:        Procedure Laterality Date    HYSTERECTOMY, VAGINAL  6/2010    OTHER SURGICAL HISTORY      uterine mobilization: fibroids    PARTIAL NEPHRECTOMY Right 12/7/2021    ROBOTIC LAPAROSCOPIC PARTIAL RIGHT NEPHRECTOMY performed by Hakan Cardoza MD at [facility]       Current Medications:   No current facility-administered medications for this visit. Prior to Admission medications    Medication Sig Start Date End Date Taking? Authorizing Provider   estradiol (ESTRACE) 1 MG tablet TAKE 1 TABLET BY MOUTH EVERY DAY 2/11/22  Yes GONZALO Mcmahan   acetaminophen (TYLENOL) 500 MG tablet Take 500 mg by mouth every 6 hours as needed for Pain   Yes Historical Provider, MD   senna-docusate (Beckie Brome) 8.6-50 MG per tablet Take 1 tablet by mouth 2 times daily 12/7/21  Yes Hakan Cardoza MD   Calcium Carbonate Antacid (CALCIUM CARBONATE PO) Calcium Carbonate Oral        Active   Yes Historical Provider, MD   vitamin B-12 (CYANOCOBALAMIN) 100 MCG tablet Take 1,000 mcg by mouth daily    Yes Historical Provider, MD        Allergies:  Patient has no known allergies.     Social History:    reports that she quit smoking about 7 years ago. She has a 10.00 pack-year smoking history. She has never used smokeless tobacco. She reports previous alcohol use. She reports that she does not use drugs. Family History:        Problem Relation Age of Onset    Diabetes Mother     High Blood Pressure Mother     Other Father         Pulmonary embolism, DVT's    Cancer Maternal Aunt         brain    Cancer Maternal Grandmother         brain    No Known Problems Maternal Grandfather     No Known Problems Paternal Grandmother     No Known Problems Paternal Grandfather     No Known Problems Sister        REVIEW OF SYSTEMS:  Review of Systems   Constitutional: Negative. HENT: Negative. Eyes: Negative. Respiratory: Negative. Cardiovascular: Negative. Gastrointestinal: Positive for abdominal pain, constipation, diarrhea, nausea and vomiting. Endocrine: Negative. Genitourinary: Negative. Musculoskeletal: Negative. Skin: Negative. Allergic/Immunologic: Negative. Neurological: Negative. Hematological: Negative. Psychiatric/Behavioral: Negative.         PHYSICAL EXAM:  VITALS:  BP (!) 154/91   Wt 145 lb (65.8 kg)   BMI 22.05 kg/m²     CONSTITUTIONAL:  alert, no apparent distress and normal weight  EYES:  sclera clear  ENT:  normocepalic, without obvious abnormality  NECK:  supple, symmetrical, trachea midline  LUNGS:  clear to auscultation  CARDIOVASCULAR:  regular rate and rhythm  ABDOMEN:  scars noted are healed, normal bowel sounds, soft, non-distended, tenderness noted in the right upper quadrant Lepe's sign is absent, voluntary guarding absent, no masses palpated, no hepatosplenomegally and hernia absent  MUSCULOSKELETAL:  0+ pitting edema lower extremities  NEUROLOGIC:  Mental Status Exam:  Level of Alertness:   awake  Orientation:   person, place, time  SKIN:  no bruising or bleeding, normal skin color, texture, turgor and no redness, warmth, or swelling    DATA:    EXAMINATION:   RIGHT UPPER QUADRANT ULTRASOUND     3/11/2022 3:39 pm       COMPARISON:   CT dated 10/21/2021       HISTORY:   ORDERING SYSTEM PROVIDED HISTORY: Elevated ferritin   TECHNOLOGIST PROVIDED HISTORY:   Elevated ferritin, right upper quadrant pain   Reason for exam:->r79.89   Reason for Exam: hx of hemochromatosis       FINDINGS:   LIVER:  Liver demonstrates overall normal echogenicity with a 1.4 cm simple   appearing left hemiliver cyst.       BILIARY SYSTEM:  Gallbladder is unremarkable without evidence of   pericholecystic fluid, wall thickening or stones.  Minimal low level echoes   consistent with sludge negative sonographic Lepe's sign.       Common bile duct is within normal limits measuring 4 mm.       RIGHT KIDNEY: The right kidney is grossly unremarkable without evidence of   hydronephrosis.       PANCREAS:  Visualized portions of the pancreas are unremarkable.       OTHER: No evidence of right upper quadrant ascites.           Impression   Sludge within the gallbladder however no definitive shadowing focus of   cholelithiasis.  No wall thickening or pericholecystic fluid.         LABS:   Ref.  Range 12/2/2021 09:46   Sodium Latest Ref Range: 136 - 145 mmol/L 138   Potassium Latest Ref Range: 3.5 - 5.1 mmol/L 4.4   Chloride Latest Ref Range: 99 - 110 mmol/L 100   CO2 Latest Ref Range: 21 - 32 mmol/L 23   BUN,BUNPL Latest Ref Range: 7 - 20 mg/dL 11   Creatinine Latest Ref Range: 0.6 - 1.1 mg/dL 0.7   Anion Gap Latest Ref Range: 3 - 16  15   GFR Non- Latest Ref Range: >60  >60   GFR  Latest Ref Range: >60  >60   GLUCOSE, FASTING,GF Latest Ref Range: 70 - 99 mg/dL 98   CALCIUM, SERUM, 455606 Latest Ref Range: 8.3 - 10.6 mg/dL 9.1   Total Protein Latest Ref Range: 6.4 - 8.2 g/dL 7.6   Albumin Latest Ref Range: 3.4 - 5.0 g/dL 4.6   Albumin/Globulin Ratio Latest Ref Range: 1.1 - 2.2  1.5   Alk Phos Latest Ref Range: 40 - 129 U/L 64   ALT Latest Ref Range: 10 - 40 U/L 17   AST Latest Ref Range: 15 - 37 U/L 21 Bilirubin Latest Ref Range: 0.0 - 1.0 mg/dL 0.8   WBC Latest Ref Range: 4.0 - 11.0 K/uL 4.8   RBC Latest Ref Range: 4.00 - 5.20 M/uL 4.32   Hemoglobin Quant Latest Ref Range: 12.0 - 16.0 g/dL 14.8   Hematocrit Latest Ref Range: 36.0 - 48.0 % 43.9   MCV Latest Ref Range: 80.0 - 100.0 fL 101.7 (H)   MCH Latest Ref Range: 26.0 - 34.0 pg 34.2 (H)   MCHC Latest Ref Range: 31.0 - 36.0 g/dL 33.6   MPV Latest Ref Range: 5.0 - 10.5 fL 8.3   RDW Latest Ref Range: 12.4 - 15.4 % 12.6   Platelet Count Latest Ref Range: 135 - 450 K/uL 237       IMPRESSION/RECOMMENDATIONS:    Persisting RUQ pain, biliary sludge, chronic cholecystitis    The patient has symptomatic gallbladder disease for which I have recommended a laparoscopic cholecystectomy with cholangiogram.    The plan for surgery, risks, benefits, and alternatives have been reviewed with the patient. Handouts are reviewed and given to her today.     Will do a needle liver bx as well re: status of liver with hemachromatosis    Thank you,    Reema Maxwell MD

## 2022-04-15 NOTE — PROGRESS NOTES
Name_______________________________________Printed:____________________  Date and time of surgery_______4/18/22 0830_________________Arrival Time:_____[facility] [place]___   1. The instructions given regarding when and if a patient needs to stop oral intake prior to surgery varies. Follow the specific instructions you were given                  _x__Nothing to eat or to drink after Midnight the night before.                   ____Carbo loading or ERAS instructions will be given to select patients-if you have been given those instructions -please do the following                           The evening before your surgery after dinner before midnight drink 40 ounces of gatorade. If you are diabetic use sugar free. The morning of surgery drink 40 ounces of water. This needs to be finished 3 hours prior to your surgery start time. 2. Take the following pills with a small sip of water on the morning of surgery___________________________________________________                  Do not take blood pressure medications ending in pril or sartan the rashad prior to surgery or the morning of surgery_   3. Aspirin, Ibuprofen, Advil, Naproxen, Vitamin E and other Anti-inflammatory products and supplements should be stopped for 5 -7days before surgery or as directed by your physician. 4. Check with your Doctor regarding stopping Plavix, Coumadin,Eliquis, Lovenox,Effient,Pradaxa,Xarelto, Fragmin or other blood thinners and follow their instructions. 5. Do not smoke, and do not drink any alcoholic beverages 24 hours prior to surgery. This includes NA Beer. Refrain from the usage of any recreational drugs. 6. You may brush your teeth and gargle the morning of surgery. DO NOT SWALLOW WATER   7. You MUST make arrangements for a responsible adult to stay on site while you are here and take you home after your surgery. You will not be allowed to leave alone or drive yourself home.   It is strongly suggested someone stay with you the first 24 hrs. Your surgery will be cancelled if you do not have a ride home. 8. A parent/legal guardian must accompany a child scheduled for surgery and plan to stay at the hospital until the child is discharged. Please do not bring other children with you. 9. Please wear simple, loose fitting clothing to the hospital.  Diana Lynn not bring valuables (money, credit cards, checkbooks, etc.) Do not wear any makeup (including no eye makeup) or nail polish on your fingers or toes. 10. DO NOT wear any jewelry or piercings on day of surgery. All body piercing jewelry must be removed. 11. If you have ___dentures, they will be removed before going to the OR; we will provide you a container. If you wear ___contact lenses or ___glasses, they will be removed; please bring a case for them. 12. Please see your family doctor/pediatrician for a history & physical and/or concerning medications. Bring any test results/reports from your physician's office. PCP________x__________Phone___________H&P Appt. Date________             13 If you  have a Living Will and Durable Power of  for Healthcare, please bring in a copy. 15. Notify your Surgeon if you develop any illness between now and surgery  time, cough, cold, fever, sore throat, nausea, vomiting, etc.  Please notify your surgeon if you experience dizziness, shortness of breath or blurred vision between now & the time of your surgery             15. DO NOT shave your operative site 96 hours prior to surgery. For face & neck surgery, men may use an electric razor 48 hours prior to surgery. 16. Shower the night before or morning of surgery using an antibacterial soap or as you have been instructed. 17. To provide excellent care visitors will be limited to one in the room at any given time. 18.  Please bring picture ID and insurance card.              19.  Visit our web site for additional information:  [email or web address]              20.During flu season no children under the age of 15 are permitted in the hospital for the safety of all patients. 21. If you take a long acting insulin in the evening only  take half of your usual  dose the night  before your procedure              22. If you use a c-pap please bring DOS if staying overnight,             23.For your convenience [facility] has a pharmacy on site to fill your prescriptions. 24. If you use oxygen and have a portable tank please bring it  with you the DOS             25. Bring a complete list of all your medications with name and dose include any supplements. 26. Other__________________________________________   *Please call pre admission testing if you any further questions   [address] Air  [phone]   [address] [place] [address]       VISITOR POLICY(subject to change)    Current policy is 2 visitors per patient. No children. A mask is required. Visiting hours are 8a-8p. Overnight visitors will be at the discretion of the nurse. All above information reviewed with patient in person or by phone. Patient verbalizes understanding. All questions and concerns addressed.                                                                                                  Patient/Rep________pt____________                                                                                                                                    PRE OP INSTRUCTIONS

## 2022-04-18 ENCOUNTER — HOSPITAL ENCOUNTER (OUTPATIENT)
Age: 47
Setting detail: OUTPATIENT SURGERY
Discharge: HOME | End: 2022-04-18
Attending: SURGERY | Admitting: SURGERY
Payer: COMMERCIAL

## 2022-04-18 ENCOUNTER — ANESTHESIA EVENT (OUTPATIENT)
Dept: OPERATING ROOM | Age: 47
End: 2022-04-18
Payer: COMMERCIAL

## 2022-04-18 ENCOUNTER — APPOINTMENT (OUTPATIENT)
Dept: GENERAL RADIOLOGY | Age: 47
End: 2022-04-18
Attending: SURGERY
Payer: COMMERCIAL

## 2022-04-18 ENCOUNTER — ANESTHESIA (OUTPATIENT)
Dept: OPERATING ROOM | Age: 47
End: 2022-04-18
Payer: COMMERCIAL

## 2022-04-18 VITALS
TEMPERATURE: 96.8 F | SYSTOLIC BLOOD PRESSURE: 175 MMHG | OXYGEN SATURATION: 100 % | DIASTOLIC BLOOD PRESSURE: 89 MMHG | HEART RATE: 59 BPM | BODY MASS INDEX: 22.43 KG/M2 | WEIGHT: 148 LBS | HEIGHT: 68 IN | RESPIRATION RATE: 16 BRPM

## 2022-04-18 VITALS
DIASTOLIC BLOOD PRESSURE: 77 MMHG | OXYGEN SATURATION: 99 % | TEMPERATURE: 94.8 F | RESPIRATION RATE: 5 BRPM | SYSTOLIC BLOOD PRESSURE: 138 MMHG

## 2022-04-18 DIAGNOSIS — K81.1 CHRONIC CHOLECYSTITIS: Primary | ICD-10-CM

## 2022-04-18 PROCEDURE — 6360000004 HC RX CONTRAST MEDICATION: Performed by: SURGERY

## 2022-04-18 PROCEDURE — 6360000002 HC RX W HCPCS: Performed by: SURGERY

## 2022-04-18 PROCEDURE — 3700000000 HC ANESTHESIA ATTENDED CARE: Performed by: SURGERY

## 2022-04-18 PROCEDURE — 2500000003 HC RX 250 WO HCPCS: Performed by: SURGERY

## 2022-04-18 PROCEDURE — 6360000002 HC RX W HCPCS: Performed by: ANESTHESIOLOGY

## 2022-04-18 PROCEDURE — 88313 SPECIAL STAINS GROUP 2: CPT

## 2022-04-18 PROCEDURE — 6370000000 HC RX 637 (ALT 250 FOR IP): Performed by: ANESTHESIOLOGY

## 2022-04-18 PROCEDURE — 47563 LAPARO CHOLECYSTECTOMY/GRAPH: CPT | Performed by: SURGERY

## 2022-04-18 PROCEDURE — 7100000010 HC PHASE II RECOVERY - FIRST 15 MIN: Performed by: SURGERY

## 2022-04-18 PROCEDURE — 7100000001 HC PACU RECOVERY - ADDTL 15 MIN: Performed by: SURGERY

## 2022-04-18 PROCEDURE — 7100000011 HC PHASE II RECOVERY - ADDTL 15 MIN: Performed by: SURGERY

## 2022-04-18 PROCEDURE — 74300 X-RAY BILE DUCTS/PANCREAS: CPT

## 2022-04-18 PROCEDURE — 2580000003 HC RX 258: Performed by: SURGERY

## 2022-04-18 PROCEDURE — 2580000003 HC RX 258: Performed by: ANESTHESIOLOGY

## 2022-04-18 PROCEDURE — 2709999900 HC NON-CHARGEABLE SUPPLY: Performed by: SURGERY

## 2022-04-18 PROCEDURE — 2720000010 HC SURG SUPPLY STERILE: Performed by: SURGERY

## 2022-04-18 PROCEDURE — 88307 TISSUE EXAM BY PATHOLOGIST: CPT

## 2022-04-18 PROCEDURE — 2500000003 HC RX 250 WO HCPCS: Performed by: ANESTHESIOLOGY

## 2022-04-18 PROCEDURE — 88304 TISSUE EXAM BY PATHOLOGIST: CPT

## 2022-04-18 PROCEDURE — 3600000004 HC SURGERY LEVEL 4 BASE: Performed by: SURGERY

## 2022-04-18 PROCEDURE — 47001 NDL BIOPSY LVR TM OTH MAJ PX: CPT | Performed by: SURGERY

## 2022-04-18 PROCEDURE — 7100000000 HC PACU RECOVERY - FIRST 15 MIN: Performed by: SURGERY

## 2022-04-18 PROCEDURE — 3700000001 HC ADD 15 MINUTES (ANESTHESIA): Performed by: SURGERY

## 2022-04-18 PROCEDURE — 3600000014 HC SURGERY LEVEL 4 ADDTL 15MIN: Performed by: SURGERY

## 2022-04-18 RX ORDER — FENTANYL CITRATE 50 UG/ML
INJECTION, SOLUTION INTRAMUSCULAR; INTRAVENOUS PRN
Status: DISCONTINUED | OUTPATIENT
Start: 2022-04-18 | End: 2022-04-18 | Stop reason: SDUPTHER

## 2022-04-18 RX ORDER — KETAMINE HYDROCHLORIDE 10 MG/ML
INJECTION, SOLUTION INTRAMUSCULAR; INTRAVENOUS PRN
Status: DISCONTINUED | OUTPATIENT
Start: 2022-04-18 | End: 2022-04-18 | Stop reason: SDUPTHER

## 2022-04-18 RX ORDER — SODIUM CHLORIDE, SODIUM LACTATE, POTASSIUM CHLORIDE, CALCIUM CHLORIDE 600; 310; 30; 20 MG/100ML; MG/100ML; MG/100ML; MG/100ML
INJECTION, SOLUTION INTRAVENOUS CONTINUOUS
Status: DISCONTINUED | OUTPATIENT
Start: 2022-04-18 | End: 2022-04-18 | Stop reason: HOSPADM

## 2022-04-18 RX ORDER — MEPERIDINE HYDROCHLORIDE 25 MG/ML
12.5 INJECTION INTRAMUSCULAR; INTRAVENOUS; SUBCUTANEOUS EVERY 5 MIN PRN
Status: DISCONTINUED | OUTPATIENT
Start: 2022-04-18 | End: 2022-04-18 | Stop reason: HOSPADM

## 2022-04-18 RX ORDER — SODIUM CHLORIDE, SODIUM LACTATE, POTASSIUM CHLORIDE, CALCIUM CHLORIDE 600; 310; 30; 20 MG/100ML; MG/100ML; MG/100ML; MG/100ML
INJECTION, SOLUTION INTRAVENOUS CONTINUOUS PRN
Status: COMPLETED | OUTPATIENT
Start: 2022-04-18 | End: 2022-04-18

## 2022-04-18 RX ORDER — ONDANSETRON 2 MG/ML
4 INJECTION INTRAMUSCULAR; INTRAVENOUS
Status: COMPLETED | OUTPATIENT
Start: 2022-04-18 | End: 2022-04-18

## 2022-04-18 RX ORDER — OXYCODONE HYDROCHLORIDE 5 MG/1
5 TABLET ORAL
Status: COMPLETED | OUTPATIENT
Start: 2022-04-18 | End: 2022-04-18

## 2022-04-18 RX ORDER — KETOROLAC TROMETHAMINE 30 MG/ML
INJECTION, SOLUTION INTRAMUSCULAR; INTRAVENOUS PRN
Status: DISCONTINUED | OUTPATIENT
Start: 2022-04-18 | End: 2022-04-18 | Stop reason: SDUPTHER

## 2022-04-18 RX ORDER — LIDOCAINE HYDROCHLORIDE 10 MG/ML
1 INJECTION, SOLUTION EPIDURAL; INFILTRATION; INTRACAUDAL; PERINEURAL
Status: DISCONTINUED | OUTPATIENT
Start: 2022-04-18 | End: 2022-04-18 | Stop reason: HOSPADM

## 2022-04-18 RX ORDER — ONDANSETRON 2 MG/ML
INJECTION INTRAMUSCULAR; INTRAVENOUS PRN
Status: DISCONTINUED | OUTPATIENT
Start: 2022-04-18 | End: 2022-04-18 | Stop reason: SDUPTHER

## 2022-04-18 RX ORDER — BUPIVACAINE HYDROCHLORIDE 5 MG/ML
INJECTION, SOLUTION EPIDURAL; INTRACAUDAL
Status: COMPLETED | OUTPATIENT
Start: 2022-04-18 | End: 2022-04-18

## 2022-04-18 RX ORDER — SODIUM CHLORIDE 9 MG/ML
INJECTION, SOLUTION INTRAVENOUS CONTINUOUS
Status: DISCONTINUED | OUTPATIENT
Start: 2022-04-18 | End: 2022-04-18 | Stop reason: HOSPADM

## 2022-04-18 RX ORDER — LABETALOL HYDROCHLORIDE 5 MG/ML
10 INJECTION, SOLUTION INTRAVENOUS
Status: DISCONTINUED | OUTPATIENT
Start: 2022-04-18 | End: 2022-04-18 | Stop reason: HOSPADM

## 2022-04-18 RX ORDER — HYDRALAZINE HYDROCHLORIDE 20 MG/ML
10 INJECTION INTRAMUSCULAR; INTRAVENOUS
Status: DISCONTINUED | OUTPATIENT
Start: 2022-04-18 | End: 2022-04-18 | Stop reason: HOSPADM

## 2022-04-18 RX ORDER — HYDROCODONE BITARTRATE AND ACETAMINOPHEN 5; 325 MG/1; MG/1
1 TABLET ORAL EVERY 4 HOURS PRN
Qty: 20 TABLET | Refills: 0 | Status: SHIPPED | OUTPATIENT
Start: 2022-04-18 | End: 2022-04-25

## 2022-04-18 RX ORDER — DEXAMETHASONE SODIUM PHOSPHATE 4 MG/ML
INJECTION, SOLUTION INTRA-ARTICULAR; INTRALESIONAL; INTRAMUSCULAR; INTRAVENOUS; SOFT TISSUE PRN
Status: DISCONTINUED | OUTPATIENT
Start: 2022-04-18 | End: 2022-04-18 | Stop reason: SDUPTHER

## 2022-04-18 RX ORDER — LIDOCAINE HYDROCHLORIDE 20 MG/ML
INJECTION, SOLUTION EPIDURAL; INFILTRATION; INTRACAUDAL; PERINEURAL PRN
Status: DISCONTINUED | OUTPATIENT
Start: 2022-04-18 | End: 2022-04-18 | Stop reason: SDUPTHER

## 2022-04-18 RX ORDER — MIDAZOLAM HYDROCHLORIDE 1 MG/ML
INJECTION INTRAMUSCULAR; INTRAVENOUS PRN
Status: DISCONTINUED | OUTPATIENT
Start: 2022-04-18 | End: 2022-04-18 | Stop reason: SDUPTHER

## 2022-04-18 RX ORDER — HYDROMORPHONE HCL 110MG/55ML
0.5 PATIENT CONTROLLED ANALGESIA SYRINGE INTRAVENOUS EVERY 5 MIN PRN
Status: DISCONTINUED | OUTPATIENT
Start: 2022-04-18 | End: 2022-04-18 | Stop reason: HOSPADM

## 2022-04-18 RX ORDER — PROPOFOL 10 MG/ML
INJECTION, EMULSION INTRAVENOUS PRN
Status: DISCONTINUED | OUTPATIENT
Start: 2022-04-18 | End: 2022-04-18 | Stop reason: SDUPTHER

## 2022-04-18 RX ORDER — ROCURONIUM BROMIDE 10 MG/ML
INJECTION, SOLUTION INTRAVENOUS PRN
Status: DISCONTINUED | OUTPATIENT
Start: 2022-04-18 | End: 2022-04-18 | Stop reason: SDUPTHER

## 2022-04-18 RX ORDER — APREPITANT 40 MG/1
40 CAPSULE ORAL ONCE
Status: COMPLETED | OUTPATIENT
Start: 2022-04-18 | End: 2022-04-18

## 2022-04-18 RX ADMIN — SUGAMMADEX 300 MG: 100 INJECTION, SOLUTION INTRAVENOUS at 09:14

## 2022-04-18 RX ADMIN — FENTANYL CITRATE 100 MCG: 50 INJECTION, SOLUTION INTRAMUSCULAR; INTRAVENOUS at 08:37

## 2022-04-18 RX ADMIN — ONDANSETRON 4 MG: 2 INJECTION INTRAMUSCULAR; INTRAVENOUS at 08:37

## 2022-04-18 RX ADMIN — DEXAMETHASONE SODIUM PHOSPHATE 4 MG: 4 INJECTION, SOLUTION INTRAMUSCULAR; INTRAVENOUS at 08:37

## 2022-04-18 RX ADMIN — FENTANYL CITRATE 50 MCG: 50 INJECTION, SOLUTION INTRAMUSCULAR; INTRAVENOUS at 09:20

## 2022-04-18 RX ADMIN — FENTANYL CITRATE 50 MCG: 50 INJECTION, SOLUTION INTRAMUSCULAR; INTRAVENOUS at 08:54

## 2022-04-18 RX ADMIN — PROPOFOL 150 MG: 10 INJECTION, EMULSION INTRAVENOUS at 08:37

## 2022-04-18 RX ADMIN — LIDOCAINE HYDROCHLORIDE 100 MG: 20 INJECTION, SOLUTION EPIDURAL; INFILTRATION; INTRACAUDAL; PERINEURAL at 08:37

## 2022-04-18 RX ADMIN — KETAMINE HYDROCHLORIDE 30 MG: 10 INJECTION, SOLUTION INTRAMUSCULAR; INTRAVENOUS at 08:46

## 2022-04-18 RX ADMIN — MIDAZOLAM 2 MG: 1 INJECTION INTRAMUSCULAR; INTRAVENOUS at 08:37

## 2022-04-18 RX ADMIN — OXYCODONE 5 MG: 5 TABLET ORAL at 10:43

## 2022-04-18 RX ADMIN — SODIUM CHLORIDE: 9 INJECTION, SOLUTION INTRAVENOUS at 07:43

## 2022-04-18 RX ADMIN — CEFAZOLIN SODIUM 2000 MG: 10 INJECTION, POWDER, FOR SOLUTION INTRAVENOUS at 08:29

## 2022-04-18 RX ADMIN — ROCURONIUM BROMIDE 50 MG: 10 INJECTION, SOLUTION INTRAVENOUS at 08:37

## 2022-04-18 RX ADMIN — KETOROLAC TROMETHAMINE 15 MG: 60 INJECTION, SOLUTION INTRAMUSCULAR at 09:10

## 2022-04-18 RX ADMIN — ONDANSETRON 4 MG: 2 INJECTION INTRAMUSCULAR; INTRAVENOUS at 10:03

## 2022-04-18 RX ADMIN — APREPITANT 40 MG: 40 CAPSULE ORAL at 07:26

## 2022-04-18 ASSESSMENT — PULMONARY FUNCTION TESTS
PIF_VALUE: 1
PIF_VALUE: 10
PIF_VALUE: 13
PIF_VALUE: 13
PIF_VALUE: 27
PIF_VALUE: 12
PIF_VALUE: 18
PIF_VALUE: 1
PIF_VALUE: 18
PIF_VALUE: 2
PIF_VALUE: 25
PIF_VALUE: 1
PIF_VALUE: 13
PIF_VALUE: 17
PIF_VALUE: 13
PIF_VALUE: 17
PIF_VALUE: 13
PIF_VALUE: 18
PIF_VALUE: 17
PIF_VALUE: 17
PIF_VALUE: 13
PIF_VALUE: 3
PIF_VALUE: 16
PIF_VALUE: 13
PIF_VALUE: 2
PIF_VALUE: 18
PIF_VALUE: 3
PIF_VALUE: 13
PIF_VALUE: 18
PIF_VALUE: 17
PIF_VALUE: 17
PIF_VALUE: 13
PIF_VALUE: 13
PIF_VALUE: 20
PIF_VALUE: 3
PIF_VALUE: 2
PIF_VALUE: 17
PIF_VALUE: 18
PIF_VALUE: 18
PIF_VALUE: 1
PIF_VALUE: 17
PIF_VALUE: 18
PIF_VALUE: 13
PIF_VALUE: 0
PIF_VALUE: 12
PIF_VALUE: 17
PIF_VALUE: 11
PIF_VALUE: 18
PIF_VALUE: 17
PIF_VALUE: 4
PIF_VALUE: 13
PIF_VALUE: 18

## 2022-04-18 ASSESSMENT — PAIN - FUNCTIONAL ASSESSMENT: PAIN_FUNCTIONAL_ASSESSMENT: 0-10

## 2022-04-18 ASSESSMENT — PAIN SCALES - GENERAL: PAINLEVEL_OUTOF10: 8

## 2022-04-18 ASSESSMENT — LIFESTYLE VARIABLES: SMOKING_STATUS: 0

## 2022-04-18 NOTE — PROGRESS NOTES
Pt arrived from OR to PACU bay 1. Report received from OR staff. Pt asleep, unresponsive at time of arrival; pt with spontaneous respirations noted, vitals as charted. Pt arrives with oral airway in place, simple mask in place infusing 8L oxygen. Pt arrives with 4 puncture sites to abdomen dry and intact.

## 2022-04-18 NOTE — PROGRESS NOTES
Discharge instructions review with patient and pt . All home medications have been reviewed, pt v/u. Discharge instructions signed. Pt discharged via wheelchair. Pt discharged with one prescription, instructions and all belongings.  taking stable pt home.

## 2022-04-18 NOTE — ANESTHESIA PRE PROCEDURE
Department of Anesthesiology  Preprocedure Note       Name:  Inocente Nascimento   Age:  51 y.o.  :  1975                                          MRN:  [id number]         Date:  2022      Surgeon: Teresa Francis):  Roberto Rivera MD    Procedure: Procedure(s):  LAPAROSCOPIC CHOLECYSTECTOMY WITH CHOLANGIOGRAM    Medications prior to admission:   Prior to Admission medications    Medication Sig Start Date End Date Taking?  Authorizing Provider   estradiol (ESTRACE) 1 MG tablet TAKE 1 TABLET BY MOUTH EVERY DAY 22   GONZALO Burdick   acetaminophen (TYLENOL) 500 MG tablet Take 500 mg by mouth every 6 hours as needed for Pain    Historical Provider, MD   senna-docusate (Consuelo Barrack) 8.6-50 MG per tablet Take 1 tablet by mouth 2 times daily 21   Juni Baker MD   Calcium Carbonate Antacid (CALCIUM CARBONATE PO) Calcium Carbonate Oral        Active    Historical Provider, MD   vitamin B-12 (CYANOCOBALAMIN) 100 MCG tablet Take 1,000 mcg by mouth daily     Historical Provider, MD       Current medications:    Current Facility-Administered Medications   Medication Dose Route Frequency Provider Last Rate Last Admin    0.9 % sodium chloride infusion   IntraVENous Continuous George Mead MD        meperidine (DEMEROL) injection 12.5 mg  12.5 mg IntraVENous Q5 Min PRN Sami Hollins MD        HYDROmorphone (DILAUDID) injection 0.5 mg  0.5 mg IntraVENous Q5 Min PRN Sami Hollins MD        oxyCODONE (ROXICODONE) immediate release tablet 5 mg  5 mg Oral Once PRN Sami Hollins MD        ondansetron [facility]) injection 4 mg  4 mg IntraVENous Once PRN Sami Hollins MD        labetalol (NORMODYNE;TRANDATE) injection 10 mg  10 mg IntraVENous Q15 Min PRN Sami Hollins MD        Or    hydrALAZINE (APRESOLINE) injection 10 mg  10 mg IntraVENous Q15 Min PRN Sami Hollins MD        aprepitant (EMEND) capsule 40 mg  40 mg Oral Once [facility] Nellie Garcia MD        lidocaine PF 1 % injection 1 mL  1 mL IntraDERmal Once PRN Oksana Esteban MD        lactated ringers infusion   IntraVENous Continuous Oksana Esteban MD           Allergies:  No Known Allergies    Problem List:    Patient Active Problem List   Diagnosis Code    Primary insomnia F51.01    Pain of right hip joint M25.551    Labral tear of hip, 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    Right renal mass N28.89       Past Medical History:        Diagnosis Date    Hemochromatosis     IBS (irritable bowel syndrome)     Primary insomnia        Past Surgical History:        Procedure Laterality Date    HYSTERECTOMY, VAGINAL  2010    OTHER SURGICAL HISTORY      uterine mobilization: fibroids    PARTIAL NEPHRECTOMY Right 2021    ROBOTIC LAPAROSCOPIC PARTIAL RIGHT NEPHRECTOMY performed by Teofilo Rehman MD at [address] [place] [address] History:    Social History     Tobacco Use    Smoking status: Former Smoker     Packs/day: 0.50     Years: 20.00     Pack years: 10.00     Quit date: 2014     Years since quittin.6    Smokeless tobacco: Never Used   Substance Use Topics    Alcohol use: Yes     Alcohol/week: 0.0 standard drinks     Comment: social                                Counseling given: Not Answered      Vital Signs (Current):   Vitals:    04/15/22 0901   Weight: 145 lb (65.8 kg)   Height: 5' 8\" (1.727 m)                                              BP Readings from Last 3 Encounters:   22 (!) 154/91   21 (!) 154/94   21 (!) 205/101       NPO Status:                                                                                 BMI:   Wt Readings from Last 3 Encounters:   04/15/22 145 lb (65.8 kg)   22 145 lb (65.8 kg)   21 142 lb (64.4 kg)     Body mass index is 22.05 kg/m².     CBC:   Lab Results   Component Value Date    WBC 4.8 2021    RBC 4.32 2021    HGB 14.8 2021    HCT 43.9 2021    .7 2021    RDW 12.6 other week, stable, no end organ dysfcn)::., .    (-) diabetes mellitus, hypothyroidism, hyperthyroidism               Abdominal:             Vascular: Other Findings:             Anesthesia Plan      general     ASA 2       Induction: intravenous. MIPS: Postoperative opioids intended and Prophylactic antiemetics administered. Anesthetic plan and risks discussed with patient.                       Shellie Arevalo MD   4/18/2022

## 2022-04-18 NOTE — H&P
[facility]      I have reviewed the history and physical and examined the patient and find no relevant changes. I have reviewed with the patient and/or family the risks, benefits, and alternatives to the procedure.     Sharmin Mack MD  4/18/2022

## 2022-04-18 NOTE — OP NOTE
[facility]                     [address] [facility], [address] [place] [address]                                OPERATIVE REPORT    PATIENT NAME: Aurea Monet                      :        1975  MED REC NO:   [id number]                          ROOM:  ACCOUNT NO:   [de-identified]                           ADMIT DATE: 2022  PROVIDER:     Elana Prajapati MD    DATE OF PROCEDURE:  2022    PREOPERATIVE DIAGNOSES:  1. Biliary sludge and chronic cholecystitis. 2.  Hemochromatosis. POSTOPERATIVE DIAGNOSES:  1. Biliary sludge and chronic cholecystitis. 2.  Hemochromatosis. PROCEDURE:  1. Laparoscopic cholecystectomy with intraoperative angiogram.  2.  Liver biopsy Santana-Cut needle biopsy done with the [employer] Marquee  needle. SURGEON:  Elana Prajapati MD    ANESTHESIA:  General plus local.    ESTIMATED BLOOD LOSS:  Minimal.    COMPLICATIONS:  None. INDICATION:  The patient presented with right upper quadrant pain,  symptomatic gallbladder disease for laparoscopic cholecystectomy today. Intended procedure reviewed with the patient. All questions were  answered. She consents to proceed. ADDITIONAL DETAILS OF SURGERY:  The patient was brought to the operating  room and placed on the operating table in the supine position. Compression boots were placed. General anesthetic was administered. The abdomen was prepped and draped sterilely and time-out was done. A  supraumbilical 5-mm direct entry view port was placed and the abdominal  cavity was insufflated to 15 mmHg pressure. An epigastric 5-mm port and  two right lateral abdominal 5-mm ports were placed under direct vision. The gallbladder was identified and grasped superiorly at the fundus. There were adhesions present in the anterior wall of the gallbladder. These were taken down at this time laparoscopically.   The area of the  infundibulum was exposed and grasped and retracted out inferolaterally  to the right. The triangle of Calot was then exposed. The critical  angle view technique used to visualize cystic artery, cystic duct and  node of Calot. The cystic duct gallbladder junction was clipped and  partially transected. The intraoperative cholangiogram catheter was  then passed through its Angiocath in the intra-abdominal wall and the  cholangiogram was obtained. This appeared normal.  There was a couple  of air bubble seen on injection from the catheter. The duct was flushed  clear. The cholangiogram catheter was removed. The cystic duct was  clipped with three clips proximally and the transection of the duct was  completed. Cystic artery was clipped with two clips proximally, one  clip distally and it was transected. Bovie electrocautery was used to  remove the gallbladder from hepatic fossa. The gallbladder clips were  then removed and the gallbladder was suction aspirated and removed out  the epigastric 5-mm port site. The perihepatic area was inspected. The  gallbladder bed was hemostatic. The clips were in good location. No  bleeding or bile leaks were noted. The liver biopsy was then completed. The needle was passed  percutaneously into the anterior right side of the liver and biopsy  using the [employer] Marquee's needle x2 sites were done. The tissue appeared  adequate for biopsy. The sites were coagulated with Bovie  electrocautery and were hemostatic. This was verified after several  minutes. The area of the bed of the gallbladder was rechecked and the  area of the clips were rechecked. All appeared fine. Instruments and  ports were removed. Port sites appeared hemostatic. Skin was closed  with 4-0 Monocryl suture at all sites. Dermabond glue was placed. The  patient was taken to recovery room in stable condition postop.         Chyna Gramajo MD    D: 04/18/2022 9:35:01       T: 04/18/2022 9:38:38     [id number]  Job#: [id number]     Doc#: [id number]    CC:   Debi Ureña [place]       Robby Marshall MD

## 2022-05-03 ENCOUNTER — OFFICE VISIT (OUTPATIENT)
Dept: SURGERY | Age: 47
End: 2022-05-03

## 2022-05-03 VITALS — WEIGHT: 148 LBS | SYSTOLIC BLOOD PRESSURE: 143 MMHG | DIASTOLIC BLOOD PRESSURE: 98 MMHG | BODY MASS INDEX: 22.5 KG/M2

## 2022-05-03 DIAGNOSIS — K83.8 BILIARY SLUDGE: Primary | ICD-10-CM

## 2022-05-03 PROCEDURE — 99024 POSTOP FOLLOW-UP VISIT: CPT | Performed by: SURGERY

## 2022-05-03 NOTE — PROGRESS NOTES
[facility]                      PATIENT NAME: Jose R Francis     TODAY'S DATE: 5/3/2022    SUBJECTIVE:      Pt. returns to the office today following a laparoscopic cholecystectomy. She had surgery on 4/18/22 at [facility]. She has been recovering well to date, with progression towards normal activity and diet. She has no complaints today. OBJECTIVE:     VITALS:  BP (!) 143/98   Wt 148 lb (67.1 kg)   BMI 22.50 kg/m²                                  CONSTITUTIONAL:  awake and alert  LUNGS:  clear to auscultation  ABDOMEN:   normal bowel sounds, soft, non-distended, non-tender   INCISIONS: clean, dry, no drainage      Data:    Radiology Review:  Intraoperative cholangiogram was normal.      PATHOLOGY  FINAL DIAGNOSIS:        A. Gallbladder, cholecystectomy:      - Chronic cholecystitis.        B. Liver, biopsy      - Liver parenchyma with 3+ iron deposition- See Comment. ASSESSMENT AND PLAN:    51 y.o. female status post LC, IOC and liver biopsy. Pathology shows chronic cholecystitis and 3+ iron in liver. This was reviewed with the patient today. Will continue with treatment with Dr. Melva Titus. Her recovery is progressing uneventfully, and she is released to normal activity. She will call or return for any additional problems or questions.       Jim Murry MD

## 2022-08-04 ENCOUNTER — OFFICE VISIT (OUTPATIENT)
Dept: FAMILY MEDICINE CLINIC | Age: 47
End: 2022-08-04
Payer: COMMERCIAL

## 2022-08-04 VITALS
HEART RATE: 99 BPM | BODY MASS INDEX: 23.26 KG/M2 | WEIGHT: 153 LBS | DIASTOLIC BLOOD PRESSURE: 102 MMHG | TEMPERATURE: 98.2 F | SYSTOLIC BLOOD PRESSURE: 158 MMHG | OXYGEN SATURATION: 97 %

## 2022-08-04 DIAGNOSIS — M25.50 GENERALIZED JOINT PAIN: ICD-10-CM

## 2022-08-04 DIAGNOSIS — E83.119 HEMOCHROMATOSIS, UNSPECIFIED HEMOCHROMATOSIS TYPE: ICD-10-CM

## 2022-08-04 DIAGNOSIS — Z00.00 PREVENTATIVE HEALTH CARE: Primary | ICD-10-CM

## 2022-08-04 DIAGNOSIS — F51.01 PRIMARY INSOMNIA: ICD-10-CM

## 2022-08-04 DIAGNOSIS — Z12.31 ENCOUNTER FOR SCREENING MAMMOGRAM FOR MALIGNANT NEOPLASM OF BREAST: ICD-10-CM

## 2022-08-04 PROCEDURE — 99396 PREV VISIT EST AGE 40-64: CPT | Performed by: NURSE PRACTITIONER

## 2022-08-04 RX ORDER — ALPRAZOLAM 0.5 MG/1
TABLET ORAL
COMMUNITY
Start: 2022-05-13 | End: 2022-08-15

## 2022-08-04 NOTE — PROGRESS NOTES
Lorelei Gould  : 1975  Encounter date: 2022    This samson 49 y.o. female who presents with  Chief Complaint   Patient presents with    Annual Exam    New Patient     Former Stevo Johns Pt       History of present illness:    HPI History and Physical      Lorelei Gould  YOB: 1975    Date of Service:  2022    Chief Complaint:   Lorelei Gould is a 49 y.o. female who  presents for physical examination. HPI: Pt is 49year old female for annual exam.  Due for labs. Pt with existing insomnia, well controlled. Pt with existing hemachromatosis, being seen by Dr. Carl Owens, [facility]. Pt concerned about generalized joint pain, possible OA.     Wt Readings from Last 3 Encounters:   22 153 lb (69.4 kg)   22 148 lb (67.1 kg)   22 148 lb (67.1 kg)     BP Readings from Last 3 Encounters:   22 (!) 158/102   22 (!) 143/98   22 (!) 175/89       Patient Active Problem List   Diagnosis    Primary insomnia    Pain of right hip joint    Labral tear of hip, degenerative    Right renal mass    Chronic cholecystitis    Biliary sludge    Hemochromatosis       Preventive Care:  Health Maintenance   Topic Date Due    COVID-19 Vaccine (1) Never done    HIV screen  Never done    Colorectal Cancer Screen  Never done    Depression Screen  2022    Flu vaccine (1) 2022    DTaP/Tdap/Td vaccine (2 - Td or Tdap) 2024    Lipids  2026    Hepatitis C screen  Completed    Hepatitis A vaccine  Aged Out    Hepatitis B vaccine  Aged Out    Hib vaccine  Aged Out    Meningococcal (ACWY) vaccine  Aged Out    Pneumococcal 0-64 years Vaccine  Aged Out      Hx abnormal PAP: yes -   Sexual activity: single partner, contraception - none   Self-breast exams: yes  Previous DEXA scan: no  Last eye exam: NA, recommended  Exercise: yoga, exercise bike  Lipid panel:   Lab Results   Component Value Date    CHOL 215 (H) 2021    TRIG 128 2021    HDL 73 (H) 2021 [facility] 116 (H) 02/24/2021        Living will:No    Immunization History   Administered Date(s) Administered    Influenza Virus Vaccine 10/25/2010    Influenza, Intradermal, Preservative free 01/10/2013, 11/05/2013, 11/03/2015    Influenza, Intradermal, Quadrivalent, Preservative Free 11/23/2016, 11/27/2017    Influenza, Quadv, IM, PF (6 mo and older Fluzone, Flulaval, Fluarix, and 3 yrs and older Afluria) 12/16/2020    PPD Test 05/26/2009, 06/25/2018    Tdap (Boostrix, Adacel) 09/08/2014       No Known Allergies  Outpatient Medications Marked as Taking for the 8/4/22 encounter (Office Visit) with Ronak Yee, SHERON - NP   Medication Sig Dispense Refill    ALPRAZolam (XANAX) 0.5 MG tablet TAKE 1 TABLET BY MOUTH NIGHTLY AS NEEDED FOR SLEEP FOR UP TO 30 DAYS.      estradiol (ESTRACE) 1 MG tablet TAKE 1 TABLET BY MOUTH EVERY DAY 90 tablet 3    acetaminophen (TYLENOL) 500 MG tablet Take 500 mg by mouth every 6 hours as needed for Pain      Calcium Carbonate Antacid (CALCIUM CARBONATE PO) Calcium Carbonate Oral        Active         Past Medical History:   Diagnosis Date    Hemochromatosis     IBS (irritable bowel syndrome)     Primary insomnia      Past Surgical History:   Procedure Laterality Date    CHOLECYSTECTOMY, LAPAROSCOPIC N/A 4/18/2022    LAPAROSCOPIC CHOLECYSTECTOMY WITH CHOLANGIOGRAM WITH LIVER BIOPSY performed by Lizette Oviedo MD at [address] [facility], VAGINAL  6/2010    OTHER SURGICAL HISTORY      uterine mobilization: fibroids    PARTIAL NEPHRECTOMY Right 12/7/2021    ROBOTIC LAPAROSCOPIC PARTIAL RIGHT NEPHRECTOMY performed by Sharee Escamilla MD at [address] [employer]     Family History   Problem Relation Age of Onset    Diabetes Mother     High Blood Pressure Mother     Other Father         Pulmonary embolism, DVT's    Cancer Maternal Aunt         brain    Cancer Maternal Grandmother         brain    No Known Problems Maternal Grandfather     No Known Problems Paternal Grandmother     No Known Problems Paternal Grandfather     No Known Problems Sister      Social History     Socioeconomic History    Marital status:      Spouse name: Not on file    Number of children: Not on file    Years of education: Not on file    Highest education level: Not on file   Occupational History    Not on file   Tobacco Use    Smoking status: Former     Packs/day: 0.50     Years: 20.00     Pack years: 10.00     Types: Cigarettes     Quit date: 2014     Years since quittin.8    Smokeless tobacco: Never   Vaping Use    Vaping Use: Former   Substance and Sexual Activity    Alcohol use: Yes     Alcohol/week: 0.0 standard drinks     Comment: social    Drug use: No    Sexual activity: Not on file   Other Topics Concern    Not on file   Social History Narrative    Not on file     Social Determinants of Health     Financial Resource Strain: Low Risk     Difficulty of Paying Living Expenses: Not hard at all   Food Insecurity: No Food Insecurity    Worried About Running Out of Food in the Last Year: Never true    Ran Out of Food in the Last Year: Never true   Transportation Needs: Not on file   Physical Activity: Not on file   Stress: Not on file   Social Connections: Not on file   Intimate Partner Violence: Not on file   Housing Stability: Not on file       Review of Systems:  A comprehensive review of systems was negative except for what was noted in the HPI. Physical Exam:   Vitals:    22 1340   BP: (!) 158/102   Site: Right Upper Arm   Position: Sitting   Cuff Size: Medium Adult   Pulse: 99   Temp: 98.2 °F (36.8 °C)   TempSrc: Infrared   SpO2: 97%   Weight: 153 lb (69.4 kg)     Body mass index is 23.26 kg/m². Constitutional: She is oriented to person, place, and time. She appears well-developed and well-nourished. No distress. HEENT:   Head: Normocephalic and atraumatic.    Right Ear: Tympanic membrane, external ear and ear canal normal.   Left Ear: Tympanic membrane, external ear and ear canal normal.   Nose: Nose normal.   Mouth/Throat: Oropharynx is clear and moist, and mucous membranes are normal.  There is no cervical adenopathy. Eyes: Conjunctivae and extraocular motions are normal. Pupils are equal, round, and reactive to light. Neck: Neck supple. No JVD present. Carotid bruit is not present. No mass and no thyromegaly present. Cardiovascular: Normal rate, regular rhythm, normal heart sounds and intact distal pulses. Exam reveals no gallop and no friction rub. No murmur heard. Pulmonary/Chest: Effort normal and breath sounds normal. No respiratory distress. She has no wheezes, rhonchi or rales. Abdominal: Soft, non-tender. Bowel sounds and aorta are normal. She exhibits no organomegaly, mass or bruit. Breast exam:  not examined. Musculoskeletal: Normal range of motion, no synovitis. She exhibits no edema. Neurological: She is alert and oriented to person, place, and time. She has normal reflexes. No cranial nerve deficit. Coordination normal.   Skin: Skin is warm and dry. There is no rash or erythema. No suspicious lesions noted. Psychiatric: She has a normal mood and affect. Her speech is normal and behavior is normal. Judgment, cognition and memory are normal.     Assessment/Plan: Molly was seen today for annual exam and new patient. Diagnoses and all orders for this visit:    Preventative health care  -     CBC with Auto Differential; Future  -     Comprehensive Metabolic Panel, Fasting; Future  -     Lipid, Fasting; Future  -     HIV Screen; Future    Hemochromatosis, unspecified hemochromatosis type  -     Transferrin; Future  -     Ferritin; Future  -     Vitamin B12 & Folate; Future    Primary insomnia    Encounter for screening mammogram for malignant neoplasm of breast  -     LALITO DIGITAL SCREEN W OR WO CAD BILATERAL; Future    Generalized joint pain  -     Sedimentation Rate; Future  -     ARIN Reflex to Antibody Cascade;  Future  -     C-Reactive Protein; Future  -     RHEUMATOID FACTOR; Future          Current Outpatient Medications on File Prior to Visit   Medication Sig Dispense Refill    ALPRAZolam (XANAX) 0.5 MG tablet TAKE 1 TABLET BY MOUTH NIGHTLY AS NEEDED FOR SLEEP FOR UP TO 30 DAYS.      estradiol (ESTRACE) 1 MG tablet TAKE 1 TABLET BY MOUTH EVERY DAY 90 tablet 3    acetaminophen (TYLENOL) 500 MG tablet Take 500 mg by mouth every 6 hours as needed for Pain      Calcium Carbonate Antacid (CALCIUM CARBONATE PO) Calcium Carbonate Oral        Active       No current facility-administered medications on file prior to visit. No Known Allergies  Past Medical History:   Diagnosis Date    Hemochromatosis     IBS (irritable bowel syndrome)     Primary insomnia       Past Surgical History:   Procedure Laterality Date    CHOLECYSTECTOMY, LAPAROSCOPIC N/A 2022    LAPAROSCOPIC CHOLECYSTECTOMY WITH CHOLANGIOGRAM WITH LIVER BIOPSY performed by Mukul Marshall MD at [place] [address], VAGINAL  2010    OTHER SURGICAL HISTORY      uterine mobilization: fibroids    PARTIAL NEPHRECTOMY Right 2021    ROBOTIC LAPAROSCOPIC PARTIAL RIGHT NEPHRECTOMY performed by Sindi Metcalf MD at [address] [place] [address]      Family History   Problem Relation Age of Onset    Diabetes Mother     High Blood Pressure Mother     Other Father         Pulmonary embolism, DVT's    Cancer Maternal Aunt         brain    Cancer Maternal Grandmother         brain    No Known Problems Maternal Grandfather     No Known Problems Paternal Grandmother     No Known Problems Paternal Grandfather     No Known Problems Sister       Social History     Tobacco Use    Smoking status: Former     Packs/day: 0.50     Years: 20.00     Pack years: 10.00     Types: Cigarettes     Quit date: 2014     Years since quittin.8    Smokeless tobacco: Never   Substance Use Topics    Alcohol use:  Yes     Alcohol/week: 0.0 standard drinks     Comment: social        Review of Systems    Objective:    BP (!) 158/102 (Site: Right Upper Arm, Position: Sitting, Cuff Size: Medium Adult)   Pulse 99   Temp 98.2 °F (36.8 °C) (Infrared)   Wt 153 lb (69.4 kg)   SpO2 97%   BMI 23.26 kg/m²   Weight: 153 lb (69.4 kg)     BP Readings from Last 3 Encounters:   08/04/22 (!) 158/102   05/03/22 (!) 143/98   04/18/22 (!) 175/89     Wt Readings from Last 3 Encounters:   08/04/22 153 lb (69.4 kg)   05/03/22 148 lb (67.1 kg)   04/18/22 148 lb (67.1 kg)     BMI Readings from Last 3 Encounters:   08/04/22 23.26 kg/m²   05/03/22 22.50 kg/m²   04/18/22 22.50 kg/m²       Physical Exam    Assessment/Plan    1. Preventative health care  Advised routine dental and vision  Increased exercise, healthy diet and weight loss  Advised living will  - CBC with Auto Differential; Future  - Comprehensive Metabolic Panel, Fasting; Future  - Lipid, Fasting; Future  - HIV Screen; Future    2. Hemochromatosis, unspecified hemochromatosis type  Follow up [facility]  - Transferrin; Future  - Ferritin; Future  - Vitamin B12 & Folate; Future    3. Primary insomnia  Controlled  Refused to sign medication contract due to taking intermittently  Will have OV when out of medication    4. Encounter for screening mammogram for malignant neoplasm of breast  - LALITO DIGITAL SCREEN W OR WO CAD BILATERAL; Future    5. Generalized joint pain  - Sedimentation Rate; Future  - ARIN Reflex to Antibody Cascade; Future  - C-Reactive Protein; Future  - RHEUMATOID FACTOR; Future    Return in about 6 months (around 2/4/2023) for medication re-check. This dictation was generated by voice recognition computer software. Although all attempts are made to edit the dictation for accuracy, there may be errors in the transcription that are not intended.

## 2022-08-05 DIAGNOSIS — Z00.00 PREVENTATIVE HEALTH CARE: ICD-10-CM

## 2022-08-05 DIAGNOSIS — E83.119 HEMOCHROMATOSIS, UNSPECIFIED HEMOCHROMATOSIS TYPE: ICD-10-CM

## 2022-08-05 DIAGNOSIS — M25.50 GENERALIZED JOINT PAIN: ICD-10-CM

## 2022-08-05 LAB
A/G RATIO: 2.2 (ref 1.1–2.2)
ALBUMIN SERPL-MCNC: 4.9 G/DL (ref 3.4–5)
ALP BLD-CCNC: 72 U/L (ref 40–129)
ALT SERPL-CCNC: 15 U/L (ref 10–40)
ANION GAP SERPL CALCULATED.3IONS-SCNC: 16 MMOL/L (ref 3–16)
AST SERPL-CCNC: 20 U/L (ref 15–37)
BASOPHILS ABSOLUTE: 0 K/UL (ref 0–0.2)
BASOPHILS RELATIVE PERCENT: 0.5 %
BILIRUB SERPL-MCNC: 0.8 MG/DL (ref 0–1)
BUN BLDV-MCNC: 8 MG/DL (ref 7–20)
C-REACTIVE PROTEIN: <3 MG/L (ref 0–5.1)
CALCIUM SERPL-MCNC: 9.3 MG/DL (ref 8.3–10.6)
CHLORIDE BLD-SCNC: 102 MMOL/L (ref 99–110)
CHOLESTEROL, FASTING: 269 MG/DL (ref 0–199)
CO2: 24 MMOL/L (ref 21–32)
CREAT SERPL-MCNC: 0.8 MG/DL (ref 0.6–1.1)
EOSINOPHILS ABSOLUTE: 0.2 K/UL (ref 0–0.6)
EOSINOPHILS RELATIVE PERCENT: 3.1 %
FERRITIN: 57.5 NG/ML (ref 15–150)
FOLATE: 14.7 NG/ML (ref 4.78–24.2)
GFR AFRICAN AMERICAN: >60
GFR NON-AFRICAN AMERICAN: >60
GLUCOSE FASTING: 93 MG/DL (ref 70–99)
HCT VFR BLD CALC: 41.5 % (ref 36–48)
HDLC SERPL-MCNC: 82 MG/DL (ref 40–60)
HEMOGLOBIN: 14.5 G/DL (ref 12–16)
LDL CHOLESTEROL CALCULATED: 145 MG/DL
LYMPHOCYTES ABSOLUTE: 2.1 K/UL (ref 1–5.1)
LYMPHOCYTES RELATIVE PERCENT: 40.7 %
MCH RBC QN AUTO: 34.6 PG (ref 26–34)
MCHC RBC AUTO-ENTMCNC: 35 G/DL (ref 31–36)
MCV RBC AUTO: 98.9 FL (ref 80–100)
MONOCYTES ABSOLUTE: 0.4 K/UL (ref 0–1.3)
MONOCYTES RELATIVE PERCENT: 7.8 %
NEUTROPHILS ABSOLUTE: 2.5 K/UL (ref 1.7–7.7)
NEUTROPHILS RELATIVE PERCENT: 47.9 %
PDW BLD-RTO: 12.3 % (ref 12.4–15.4)
PLATELET # BLD: 284 K/UL (ref 135–450)
PMV BLD AUTO: 8 FL (ref 5–10.5)
POTASSIUM SERPL-SCNC: 4.3 MMOL/L (ref 3.5–5.1)
RBC # BLD: 4.19 M/UL (ref 4–5.2)
RHEUMATOID FACTOR: 11 IU/ML
SEDIMENTATION RATE, ERYTHROCYTE: <1 MM/HR (ref 0–20)
SODIUM BLD-SCNC: 142 MMOL/L (ref 136–145)
TOTAL PROTEIN: 7.1 G/DL (ref 6.4–8.2)
TRANSFERRIN: 286 MG/DL (ref 200–360)
TRIGLYCERIDE, FASTING: 209 MG/DL (ref 0–150)
VITAMIN B-12: 400 PG/ML (ref 211–911)
VLDLC SERPL CALC-MCNC: 42 MG/DL
WBC # BLD: 5.2 K/UL (ref 4–11)

## 2022-08-06 LAB — ANTI-NUCLEAR ANTIBODY (ANA): NEGATIVE

## 2022-08-08 DIAGNOSIS — E78.2 MIXED HYPERLIPIDEMIA: Primary | ICD-10-CM

## 2022-08-08 RX ORDER — ATORVASTATIN CALCIUM 10 MG/1
10 TABLET, FILM COATED ORAL DAILY
Qty: 30 TABLET | Refills: 3 | Status: SHIPPED | OUTPATIENT
Start: 2022-08-08 | End: 2022-08-29

## 2022-08-10 ENCOUNTER — NURSE TRIAGE (OUTPATIENT)
Dept: OTHER | Facility: CLINIC | Age: 47
End: 2022-08-10

## 2022-08-10 NOTE — TELEPHONE ENCOUNTER
Received call from Payton Reveles at [facility] with [employer] Complaint. Subjective: Caller states \"I am having some knee pain and swelling. 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 \"     Current Symptoms: Denies any new or worsening sx. Reports to have chronic illness causing overload of iron and feels as if her treatment and plan of care completed Thursday should be followed up further with the knee pain and PMH. Would like further testing done. States she would like to see a new provider as her PCP is retired and did not want to see same provider she saw on Thursday again. Onset: na    Associated Symptoms: na    Pain Severity: na    Temperature: na    What has been tried: na    LMP: NA Pregnant: NA    Recommended disposition:  Follow up per patient request. No triage. Care advice provided, patient verbalizes understanding; denies any other questions or concerns; instructed to call back for any new or worsening symptoms. Patient/Caller agrees with recommended disposition; writer provided warm transfer to Emily William at [facility] for appointment scheduling     Attention Provider: Thank you for allowing me to participate in the care of your patient. The patient was connected to triage in response to information provided to the ECC/PSC. Please do not respond through this encounter as the response is not directed to a shared pool.     Reason for Disposition   Caller has already spoken with another triager and has no further questions    Protocols used: No Contact or Duplicate Contact Call-ADULT-[place]

## 2022-08-14 SDOH — HEALTH STABILITY: PHYSICAL HEALTH: ON AVERAGE, HOW MANY DAYS PER WEEK DO YOU ENGAGE IN MODERATE TO STRENUOUS EXERCISE (LIKE A BRISK WALK)?: 2 DAYS

## 2022-08-14 SDOH — HEALTH STABILITY: PHYSICAL HEALTH: ON AVERAGE, HOW MANY MINUTES DO YOU ENGAGE IN EXERCISE AT THIS LEVEL?: 60 MIN

## 2022-08-15 ENCOUNTER — OFFICE VISIT (OUTPATIENT)
Dept: PRIMARY CARE CLINIC | Age: 47
End: 2022-08-15
Payer: COMMERCIAL

## 2022-08-15 VITALS
HEART RATE: 94 BPM | BODY MASS INDEX: 23.04 KG/M2 | WEIGHT: 152 LBS | SYSTOLIC BLOOD PRESSURE: 160 MMHG | DIASTOLIC BLOOD PRESSURE: 110 MMHG | OXYGEN SATURATION: 97 % | HEIGHT: 68 IN

## 2022-08-15 DIAGNOSIS — M79.671 RIGHT FOOT PAIN: ICD-10-CM

## 2022-08-15 DIAGNOSIS — E78.2 MIXED HYPERLIPIDEMIA: ICD-10-CM

## 2022-08-15 DIAGNOSIS — M25.561 CHRONIC PAIN OF RIGHT KNEE: ICD-10-CM

## 2022-08-15 DIAGNOSIS — G89.29 CHRONIC PAIN OF RIGHT KNEE: ICD-10-CM

## 2022-08-15 DIAGNOSIS — I10 BENIGN ESSENTIAL HTN: ICD-10-CM

## 2022-08-15 DIAGNOSIS — Z12.11 COLON CANCER SCREENING: ICD-10-CM

## 2022-08-15 DIAGNOSIS — F17.200 VAPING NICOTINE DEPENDENCE, NON-TOBACCO PRODUCT: ICD-10-CM

## 2022-08-15 DIAGNOSIS — E83.110 HEREDITARY HEMOCHROMATOSIS (HCC): Primary | ICD-10-CM

## 2022-08-15 DIAGNOSIS — G25.81 RESTLESS LEG SYNDROME: ICD-10-CM

## 2022-08-15 PROCEDURE — 99214 OFFICE O/P EST MOD 30 MIN: CPT | Performed by: INTERNAL MEDICINE

## 2022-08-15 RX ORDER — ROPINIROLE 0.25 MG/1
TABLET, FILM COATED ORAL
Qty: 90 TABLET | Refills: 3 | Status: SHIPPED | OUTPATIENT
Start: 2022-08-15 | End: 2022-08-29

## 2022-08-15 RX ORDER — LOSARTAN POTASSIUM 50 MG/1
50 TABLET ORAL DAILY
Qty: 30 TABLET | Refills: 5 | Status: SHIPPED | OUTPATIENT
Start: 2022-08-15 | End: 2022-09-06

## 2022-08-15 ASSESSMENT — ENCOUNTER SYMPTOMS
SORE THROAT: 0
TROUBLE SWALLOWING: 0
CHEST TIGHTNESS: 0
BLOOD IN STOOL: 0
BACK PAIN: 0
SHORTNESS OF BREATH: 0
COLOR CHANGE: 0
ABDOMINAL PAIN: 0
NAUSEA: 0
DIARRHEA: 0
SINUS PRESSURE: 0
ABDOMINAL DISTENTION: 0
WHEEZING: 0
VOMITING: 0
EYE REDNESS: 0
CONSTIPATION: 0
COUGH: 0
EYE PAIN: 0

## 2022-08-15 ASSESSMENT — PATIENT HEALTH QUESTIONNAIRE - PHQ9
2. FEELING DOWN, DEPRESSED OR HOPELESS: 0
SUM OF ALL RESPONSES TO PHQ QUESTIONS 1-9: 0
SUM OF ALL RESPONSES TO PHQ QUESTIONS 1-9: 0
SUM OF ALL RESPONSES TO PHQ9 QUESTIONS 1 & 2: 0
SUM OF ALL RESPONSES TO PHQ QUESTIONS 1-9: 0
SUM OF ALL RESPONSES TO PHQ QUESTIONS 1-9: 0
1. LITTLE INTEREST OR PLEASURE IN DOING THINGS: 0

## 2022-08-15 NOTE — ASSESSMENT & PLAN NOTE
Patient follows with hematology. 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 Patient used to receive therapeutic phlebotomy, patient says she is at maintenance phase with  Ferritin within normal limits  Follow-up with specialists as per their orders.

## 2022-08-15 NOTE — ASSESSMENT & PLAN NOTE
Blood pressure elevated 160/110  Patient says at home her blood pressure is normally in the 140s/90s-100s  We will start losartan 50 mg daily  To verify blood pressure cuff accuracy  To keep outpatient BP log,  counseled on exercise and diet (including DASH diet)  Goal to achieve appropriate BMI  Patient agreed with plan with verbal understanding

## 2022-08-15 NOTE — PROGRESS NOTES
Merlin Mcgarry (1975) is a 49 y.o. female   Knee Pain (right)     General health: This patient presents for check up and refills. The problem and medicine lists and chart were reviewed in detail. The patient has been worked up and treated for this/these condition/s and is compliant with taking the medication without any significant side effects. The patient's condition/s is/are chronic and unchanged and otherwise remains stable. Feels well with minor complaints. Main Problem Review - Hemochromatosis -patient diagnosed with homozygous disease, follows with hematology. Has had therapeutic phlebotomies, she is currently in a maintenance phase with a stable ferritin. Other problems review    1. Right knee pain -patient complaining of knee pain to the right knee intermittent right knee swelling. Pain mainly to postero-medial area of knee. Patient will like to know what the next step is and treatment options are.     2.  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 Her blood pressure at home has been in the 140s/100s. Patient denies severe headache, slurred speech, muscle weakness, chest pain on exertion or rest, paroxysmal nocturnal dyspnea, shortness of breath on exertion or at rest, palpitations, presyncope, diaphoresis, leg swelling       Health Maintenance    Last Papsmear-s/p  Total hysterectomy  (21-63 y/o)  Annual retinal eye exam - Patient due  will need to schedule   Annual Dentist visit - 3/2022  Tobacco smoking - NO  Alcohol Misuse - NO  Illicit Drug Use- NO  Healthy Diet and physical activity -  YES  Obesity Screen- screened 8/15/2022  Wears seat belt-  YES  End of life directives discussed with patient. -Mentions she does not have  a will/or/an advanced directives. Was instructed to start process looking into preparing her will an advanced directives.    The 10-year ASCVD risk score (Shar Ortega., et al., 2013) is: 1.4%    Values used to calculate the score:      Age: 49 years      Sex: Female      Is Non- : No      Diabetic: No      Tobacco smoker: No      Systolic Blood Pressure: 901 mmHg      Is BP treated: No      HDL Cholesterol: 82 mg/dL      Total Cholesterol: 269 mg/dL     Review of Systems   Constitutional:  Negative for activity change, appetite change, chills, fatigue, fever and unexpected weight change. HENT:  Negative for congestion, ear pain, postnasal drip, sinus pressure, sore throat, tinnitus and trouble swallowing. Eyes:  Negative for pain and redness. Respiratory:  Negative for cough, chest tightness, shortness of breath and wheezing. Cardiovascular:  Negative for chest pain, palpitations and leg swelling. Gastrointestinal:  Negative for abdominal distention, abdominal pain, blood in stool, constipation, diarrhea, nausea and vomiting. Endocrine: Negative for cold intolerance, heat intolerance and polydipsia. Genitourinary:  Negative for decreased urine volume, dysuria, flank pain, frequency, hematuria and urgency. Musculoskeletal:  Positive for arthralgias. Negative for back pain, joint swelling, neck pain and neck stiffness. Skin:  Negative for color change and rash. Neurological:  Negative for dizziness, weakness, numbness and headaches. Hematological:  Negative for adenopathy. Psychiatric/Behavioral:  Negative for behavioral problems, sleep disturbance and suicidal ideas. The patient is not nervous/anxious. BP (!) 160/110 (Site: Left Upper Arm, Position: Sitting, Cuff Size: Large Adult)   Pulse 94   Ht 5' 8\" (1.727 m)   Wt 152 lb (68.9 kg)   SpO2 97%   BMI 23.11 kg/m²    Physical Exam  Constitutional:       General: She is not in acute distress. Appearance: Normal appearance. She is not ill-appearing. HENT:      Head: Normocephalic and atraumatic.       Right Ear: External ear normal.      Left Ear: External ear normal.      Mouth/Throat: Mouth: Mucous membranes are moist.      Pharynx: No oropharyngeal exudate or posterior oropharyngeal erythema. Eyes:      Extraocular Movements: Extraocular movements intact. Conjunctiva/sclera: Conjunctivae normal.      Pupils: Pupils are equal, round, and reactive to light. Neck:      Vascular: No carotid bruit. Cardiovascular:      Rate and Rhythm: Normal rate and regular rhythm. Pulses: Normal pulses. Heart sounds: Normal heart sounds. No murmur heard. No gallop. Pulmonary:      Effort: Pulmonary effort is normal.      Breath sounds: Normal breath sounds. No wheezing, rhonchi or rales. Abdominal:      General: Abdomen is flat. There is no distension. Musculoskeletal:         General: No swelling or tenderness. Normal range of motion. Cervical back: No tenderness. Lymphadenopathy:      Cervical: No cervical adenopathy. Skin:     Findings: No erythema, lesion or rash. Neurological:      General: No focal deficit present. Mental Status: She is alert and oriented to person, place, and time. Mental status is at baseline. Cranial Nerves: No cranial nerve deficit. Motor: No weakness. Psychiatric:         Mood and Affect: Mood normal.         Behavior: Behavior normal.         Thought Content: Thought content normal.         Judgment: Judgment normal.       ASSESSMENT/PLAN:  1. Hereditary hemochromatosis ([facility] [address]  Assessment & Plan:   Patient follows with hematology. 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 Patient used to receive therapeutic phlebotomy, patient says she is at maintenance phase with  Ferritin within normal limits  Follow-up with specialists as per their orders. 2. Chronic pain of right knee  Assessment & Plan: We will image  History of hemochromatosis  May benefit from orthopedic review  Orders:  -     XR KNEE RIGHT (3 VIEWS); Future  3.  Benign essential HTN  Assessment & Plan:  Blood pressure elevated 160/110  Patient says at home her blood threatening problems. Return in about 4 months (around 12/15/2022). This note was generated in part or in whole with voice recognition software. Voice recognition is usually quite accurate but there are transcription errors that can often occur. All attempts were made to correct these errors I apologized for any  typographical errors that were not detected and corrected. Electronically signed by Bebo Neal.  Felicia Hummel MD on 8/15/2022 at 12:45 PM.

## 2022-08-17 ENCOUNTER — HOSPITAL ENCOUNTER (OUTPATIENT)
Age: 47
Discharge: HOME | End: 2022-08-17
Payer: COMMERCIAL

## 2022-08-17 ENCOUNTER — HOSPITAL ENCOUNTER (OUTPATIENT)
Dept: GENERAL RADIOLOGY | Age: 47
Discharge: HOME | End: 2022-08-17
Payer: COMMERCIAL

## 2022-08-17 DIAGNOSIS — G89.29 CHRONIC PAIN OF RIGHT KNEE: ICD-10-CM

## 2022-08-17 DIAGNOSIS — M25.561 CHRONIC PAIN OF RIGHT KNEE: ICD-10-CM

## 2022-08-17 DIAGNOSIS — M79.671 RIGHT FOOT PAIN: ICD-10-CM

## 2022-08-17 PROCEDURE — 73630 X-RAY EXAM OF FOOT: CPT

## 2022-08-17 PROCEDURE — 73562 X-RAY EXAM OF KNEE 3: CPT

## 2022-08-18 NOTE — RESULT ENCOUNTER NOTE
Please let patient know that knee x-ray shows signs of mild degenerative disease AKA arthritis. If she would like to be seen orthopedics for further assessment I could put the referral in.

## 2022-08-25 ENCOUNTER — TELEPHONE (OUTPATIENT)
Dept: PRIMARY CARE CLINIC | Age: 47
End: 2022-08-25

## 2022-08-29 ENCOUNTER — OFFICE VISIT (OUTPATIENT)
Dept: PRIMARY CARE CLINIC | Age: 47
End: 2022-08-29
Payer: COMMERCIAL

## 2022-08-29 VITALS
RESPIRATION RATE: 14 BRPM | SYSTOLIC BLOOD PRESSURE: 144 MMHG | WEIGHT: 153.2 LBS | DIASTOLIC BLOOD PRESSURE: 98 MMHG | OXYGEN SATURATION: 98 % | HEIGHT: 68 IN | HEART RATE: 96 BPM | BODY MASS INDEX: 23.22 KG/M2

## 2022-08-29 DIAGNOSIS — M79.2 NEUROPATHIC PAIN: ICD-10-CM

## 2022-08-29 DIAGNOSIS — E78.2 MIXED HYPERLIPIDEMIA: ICD-10-CM

## 2022-08-29 DIAGNOSIS — F41.1 GENERALIZED ANXIETY DISORDER: ICD-10-CM

## 2022-08-29 DIAGNOSIS — I10 BENIGN ESSENTIAL HTN: Primary | ICD-10-CM

## 2022-08-29 PROCEDURE — 99214 OFFICE O/P EST MOD 30 MIN: CPT | Performed by: INTERNAL MEDICINE

## 2022-08-29 RX ORDER — DULOXETIN HYDROCHLORIDE 30 MG/1
30 CAPSULE, DELAYED RELEASE ORAL DAILY
Qty: 30 CAPSULE | Refills: 5 | Status: SHIPPED | OUTPATIENT
Start: 2022-08-29 | End: 2022-09-13 | Stop reason: SDUPTHER

## 2022-08-29 RX ORDER — PRAVASTATIN SODIUM 10 MG
10 TABLET ORAL DAILY
Qty: 30 TABLET | Refills: 5 | Status: SHIPPED | OUTPATIENT
Start: 2022-08-29

## 2022-08-29 SDOH — ECONOMIC STABILITY: FOOD INSECURITY: WITHIN THE PAST 12 MONTHS, YOU WORRIED THAT YOUR FOOD WOULD RUN OUT BEFORE YOU GOT MONEY TO BUY MORE.: NEVER TRUE

## 2022-08-29 SDOH — ECONOMIC STABILITY: FOOD INSECURITY: WITHIN THE PAST 12 MONTHS, THE FOOD YOU BOUGHT JUST DIDN'T LAST AND YOU DIDN'T HAVE MONEY TO GET MORE.: NEVER TRUE

## 2022-08-29 ASSESSMENT — ENCOUNTER SYMPTOMS
SORE THROAT: 0
CONSTIPATION: 0
EYE REDNESS: 0
COLOR CHANGE: 0
ABDOMINAL PAIN: 0
WHEEZING: 0
DIARRHEA: 0
ABDOMINAL DISTENTION: 0
VOMITING: 0
TROUBLE SWALLOWING: 0
COUGH: 0
EYE PAIN: 0
BACK PAIN: 0
SINUS PRESSURE: 0
CHEST TIGHTNESS: 0
NAUSEA: 0
BLOOD IN STOOL: 0
SHORTNESS OF BREATH: 0

## 2022-08-29 ASSESSMENT — SOCIAL DETERMINANTS OF HEALTH (SDOH): HOW HARD IS IT FOR YOU TO PAY FOR THE VERY BASICS LIKE FOOD, HOUSING, MEDICAL CARE, AND HEATING?: NOT HARD AT ALL

## 2022-08-29 NOTE — PROGRESS NOTES
Venu Fletcher (1975) is a 49 y.o. female   Hypertension     General health: This patient presents for check up and refills. The problem and medicine lists and chart were reviewed in detail. The patient has been worked up and treated for this/these condition/s and is compliant with taking the medication without any significant side effects. The patient's condition/s is/are chronic and unchanged and otherwise remains stable. Feels well with minor complaints. Main Problem Review - HTN - Patient's BP is elevated today. Patient says her blood pressure is anxiety driven, she has whitecoat hypertension. Patient denies severe headache, slurred speech, muscle weakness, chest pain on exertion or rest, paroxysmal nocturnal dyspnea, shortness of breath on exertion or at rest, palpitations, presyncope, diaphoresis, leg swelling  Patient denies complaints or symptoms today. Patient mentions she is adherent with treatment. Other problems review    1. Nerve pain -patient complaining of neuropathic pain to lower extremities when she is working for long periods of time. Patient  complaining of numbness tingling and burning to feet after working night shifts that have. Patient will like to know what the next step is and treatment options are.     2.  Medication side effect -patient was complaining of restless leg syndrome started Requip but caused burning sensation of the skin. She also took Lipitor for elevated cholesterol, she said that neck axillary lymph nodes became sore but it went enlarged.   Since stopping both meds her symptoms have resolved      Health Maintenance    Last Papsmear-s/p  Total hysterectomy  (21-64 y/o)  Annual retinal eye exam - Patient due  will need to schedule   Annual Dentist visit - 3/2022  Tobacco smoking - NO  Alcohol Misuse - NO  Illicit Drug Use- NO  Healthy Diet and physical activity -  YES  Obesity Screen- screened 8/15/2022  Wears seat belt-  YES  End of life directives discussed with patient. -Mentions she does not have  a will/or/an advanced directives. Was instructed to start process looking into preparing her will an advanced directives. The 10-year ASCVD risk score (Anastasiya Montana, et al., 2013) is: 1.6%    Values used to calculate the score:      Age: 49 years      Sex: Female      Is Non- : No      Diabetic: No      Tobacco smoker: No      Systolic Blood Pressure: 603 mmHg      Is BP treated: Yes      HDL Cholesterol: 82 mg/dL      Total Cholesterol: 269 mg/dL     Review of Systems   Constitutional:  Negative for activity change, appetite change, chills, fatigue, fever and unexpected weight change. HENT:  Negative for congestion, ear pain, postnasal drip, sinus pressure, sore throat, tinnitus and trouble swallowing. Eyes:  Negative for pain and redness. Respiratory:  Negative for cough, chest tightness, shortness of breath and wheezing. Cardiovascular:  Negative for chest pain, palpitations and leg swelling. Gastrointestinal:  Negative for abdominal distention, abdominal pain, blood in stool, constipation, diarrhea, nausea and vomiting. Endocrine: Negative for cold intolerance, heat intolerance and polydipsia. Genitourinary:  Negative for decreased urine volume, dysuria, flank pain, frequency, hematuria and urgency. Musculoskeletal:  Negative for arthralgias, back pain, joint swelling, neck pain and neck stiffness. Skin:  Negative for color change and rash. Neurological:  Positive for numbness. Negative for dizziness, weakness and headaches. Hematological:  Negative for adenopathy. Psychiatric/Behavioral:  Negative for behavioral problems, sleep disturbance and suicidal ideas. The patient is not nervous/anxious.        BP (!) 144/98 (Site: Left Upper Arm, Position: Sitting, Cuff Size: Medium Adult)   Pulse 96   Resp 14   Ht 5' 8\" (1.727 m)   Wt 153 lb 3.2 oz (69.5 kg)   SpO2 98%   BMI 23.29 kg/m²    Physical Exam  Constitutional: General: She is not in acute distress. Appearance: Normal appearance. She is not ill-appearing. HENT:      Head: Normocephalic and atraumatic. Right Ear: External ear normal.      Left Ear: External ear normal.      Mouth/Throat:      Mouth: Mucous membranes are moist.      Pharynx: No oropharyngeal exudate or posterior oropharyngeal erythema. Eyes:      Extraocular Movements: Extraocular movements intact. Conjunctiva/sclera: Conjunctivae normal.      Pupils: Pupils are equal, round, and reactive to light. Neck:      Vascular: No carotid bruit. Cardiovascular:      Rate and Rhythm: Normal rate and regular rhythm. Pulses: Normal pulses. Heart sounds: Normal heart sounds. No murmur heard. No gallop. Pulmonary:      Effort: Pulmonary effort is normal.      Breath sounds: Normal breath sounds. No wheezing, rhonchi or rales. Abdominal:      General: Abdomen is flat. There is no distension. Musculoskeletal:         General: No swelling or tenderness. Normal range of motion. Cervical back: No tenderness. Lymphadenopathy:      Cervical: No cervical adenopathy. Skin:     Findings: No erythema, lesion or rash. Neurological:      General: No focal deficit present. Mental Status: She is alert and oriented to person, place, and time. Mental status is at baseline. Cranial Nerves: No cranial nerve deficit. Motor: No weakness. Psychiatric:         Mood and Affect: Mood normal.         Behavior: Behavior normal.         Thought Content: Thought content normal.         Judgment: Judgment normal.       ASSESSMENT/PLAN:  1. Benign essential HTN  Assessment & Plan:  Blood pressure elevated 156/96  Patient says at home her blood pressure is normally in the 140s/90s in the morning, 120s/70s in the  Patient says her blood pressure is anxiety driven as she has whitecoat hypertension.   She says soon as anxiety improves she anticipates morning blood pressures to be PM.

## 2022-09-02 ENCOUNTER — TELEPHONE (OUTPATIENT)
Dept: PRIMARY CARE CLINIC | Age: 47
End: 2022-09-02

## 2022-09-06 DIAGNOSIS — I10 BENIGN ESSENTIAL HTN: ICD-10-CM

## 2022-09-06 DIAGNOSIS — E78.2 MIXED HYPERLIPIDEMIA: ICD-10-CM

## 2022-09-06 RX ORDER — LOSARTAN POTASSIUM 50 MG/1
TABLET ORAL
Qty: 30 TABLET | Refills: 5 | Status: SHIPPED | OUTPATIENT
Start: 2022-09-06 | End: 2022-10-27 | Stop reason: SDUPTHER

## 2022-09-07 RX ORDER — ATORVASTATIN CALCIUM 10 MG/1
TABLET, FILM COATED ORAL
Qty: 30 TABLET | Refills: 3 | OUTPATIENT
Start: 2022-09-07

## 2022-09-13 ENCOUNTER — TELEPHONE (OUTPATIENT)
Dept: PRIMARY CARE CLINIC | Age: 47
End: 2022-09-13

## 2022-09-13 DIAGNOSIS — F41.1 GENERALIZED ANXIETY DISORDER: ICD-10-CM

## 2022-09-13 DIAGNOSIS — M79.2 NEUROPATHIC PAIN: ICD-10-CM

## 2022-09-13 RX ORDER — DULOXETIN HYDROCHLORIDE 60 MG/1
60 CAPSULE, DELAYED RELEASE ORAL DAILY
Qty: 30 CAPSULE | Refills: 5 | Status: SHIPPED | OUTPATIENT
Start: 2022-09-13

## 2022-09-13 NOTE — TELEPHONE ENCOUNTER
Please ask patient to come in for nurse BP visit at her earliest convenience. I have sentCymbalta prescription to the pharmacy.

## 2022-09-20 DIAGNOSIS — E78.2 MIXED HYPERLIPIDEMIA: ICD-10-CM

## 2022-09-20 DIAGNOSIS — F41.1 GENERALIZED ANXIETY DISORDER: ICD-10-CM

## 2022-09-20 DIAGNOSIS — M79.2 NEUROPATHIC PAIN: ICD-10-CM

## 2022-09-20 RX ORDER — DULOXETIN HYDROCHLORIDE 30 MG/1
CAPSULE, DELAYED RELEASE ORAL
Qty: 30 CAPSULE | Refills: 5 | OUTPATIENT
Start: 2022-09-20

## 2022-09-20 RX ORDER — PRAVASTATIN SODIUM 10 MG
TABLET ORAL
Qty: 30 TABLET | Refills: 5 | OUTPATIENT
Start: 2022-09-20

## 2022-10-06 ENCOUNTER — PROCEDURE VISIT (OUTPATIENT)
Dept: NEUROLOGY | Age: 47
End: 2022-10-06
Payer: COMMERCIAL

## 2022-10-06 DIAGNOSIS — R20.0 BILATERAL LEG NUMBNESS: Primary | ICD-10-CM

## 2022-10-06 PROCEDURE — 95909 NRV CNDJ TST 5-6 STUDIES: CPT | Performed by: PSYCHIATRY & NEUROLOGY

## 2022-10-06 PROCEDURE — 95886 MUSC TEST DONE W/N TEST COMP: CPT | Performed by: PSYCHIATRY & NEUROLOGY

## 2022-10-06 NOTE — PATIENT INSTRUCTIONS
Verbal consent was obtained from patient and/or patient's advocate for in office procedure with Dr. Tory Gonzalez (EMG or EEG).

## 2022-10-06 NOTE — PROGRESS NOTES
John Hurt M.D. [facility]) Physicians/[facility]  Board Certified in [address] [facility] [address] [facility], [address] [place] [address]    EMG / NERVE CONDUCTION STUDY      PATIENT:  Oksana Quintero       DATE OF EMG:  10/06/22     YOB: 1975       REASON FOR EMG:   Bilateral leg numbness      REFERRING PHYSICIAN:  Fidel Cisneros MD  [place], [place] [address]     SUMMARY:   Bilateral peroneal motor nerve studies were normal.  Bilateral posterior tibial motor nerve studies were normal.  Bilateral sural sensory nerve studies were normal.  Needle EMG of several muscles in both lower extremities were normal.      CLINICAL DIAGNOSIS:  Peripheral neuropathy        EMG RESULTS:   This is a normal EMG and nerve conduction study of both lower extremities. ---------------------------------------------  John Hurt M.D.   Electromyographer / Neurologist

## 2022-10-07 NOTE — RESULT ENCOUNTER NOTE
Please go ahead give patient their result. Patient can make an appointment to discuss the next step.     If she has a remote history of smoking seeing cigarettes we could  do ankle-brachial index

## 2022-10-27 ENCOUNTER — OFFICE VISIT (OUTPATIENT)
Dept: PRIMARY CARE CLINIC | Age: 47
End: 2022-10-27
Payer: COMMERCIAL

## 2022-10-27 VITALS
OXYGEN SATURATION: 98 % | WEIGHT: 155.2 LBS | SYSTOLIC BLOOD PRESSURE: 144 MMHG | DIASTOLIC BLOOD PRESSURE: 96 MMHG | HEIGHT: 68 IN | BODY MASS INDEX: 23.52 KG/M2 | RESPIRATION RATE: 14 BRPM | HEART RATE: 90 BPM

## 2022-10-27 DIAGNOSIS — E83.110 HEREDITARY HEMOCHROMATOSIS (HCC): ICD-10-CM

## 2022-10-27 DIAGNOSIS — N28.89 RIGHT RENAL MASS: ICD-10-CM

## 2022-10-27 DIAGNOSIS — M79.7 FIBROMYALGIA: ICD-10-CM

## 2022-10-27 DIAGNOSIS — Z23 NEEDS FLU SHOT: ICD-10-CM

## 2022-10-27 DIAGNOSIS — E78.2 MIXED HYPERLIPIDEMIA: ICD-10-CM

## 2022-10-27 DIAGNOSIS — I10 BENIGN ESSENTIAL HTN: Primary | ICD-10-CM

## 2022-10-27 PROCEDURE — 90674 CCIIV4 VAC NO PRSV 0.5 ML IM: CPT | Performed by: INTERNAL MEDICINE

## 2022-10-27 PROCEDURE — 99213 OFFICE O/P EST LOW 20 MIN: CPT | Performed by: INTERNAL MEDICINE

## 2022-10-27 PROCEDURE — 3078F DIAST BP <80 MM HG: CPT | Performed by: INTERNAL MEDICINE

## 2022-10-27 PROCEDURE — 3074F SYST BP LT 130 MM HG: CPT | Performed by: INTERNAL MEDICINE

## 2022-10-27 PROCEDURE — 90471 IMMUNIZATION ADMIN: CPT | Performed by: INTERNAL MEDICINE

## 2022-10-27 RX ORDER — LOSARTAN POTASSIUM 100 MG/1
100 TABLET ORAL DAILY
Qty: 90 TABLET | Refills: 1 | Status: SHIPPED | OUTPATIENT
Start: 2022-10-27

## 2022-10-27 ASSESSMENT — ENCOUNTER SYMPTOMS
COUGH: 0
SINUS PRESSURE: 0
BLOOD IN STOOL: 0
ABDOMINAL PAIN: 0
CONSTIPATION: 0
TROUBLE SWALLOWING: 0
ABDOMINAL DISTENTION: 0
SORE THROAT: 0
EYE PAIN: 0
NAUSEA: 0
WHEEZING: 0
SHORTNESS OF BREATH: 0
CHEST TIGHTNESS: 0
BACK PAIN: 0
COLOR CHANGE: 0
DIARRHEA: 0
EYE REDNESS: 0
VOMITING: 0

## 2022-10-27 NOTE — ASSESSMENT & PLAN NOTE
Patient follows with hematology. 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 Patient used to receive therapeutic phlebotomy, patient says she is at maintenance phase with  Ferritin within normal limits  Has follow-up with hematologist in 12/2022  Follow-up with specialists as per their orders.

## 2022-10-27 NOTE — PROGRESS NOTES
Waldemar Bennett (1975) is a 49 y.o. female   Hypertension     General health: This patient presents for check up and refills. The problem and medicine lists and chart were reviewed in detail. The patient has been worked up and treated for this/these condition/s and is compliant with taking the medication without any significant side effects. The patient's condition/s is/are chronic and unchanged and otherwise remains stable. Feels well with minor complaints. Main Problem Review - HTN - Patient's BP is elevated today. Patient denies  severe headache, slurred speech, muscle weakness, chest pain on exertion or rest, paroxysmal nocturnal dyspnea, shortness of breath on exertion or at rest, palpitations, presyncope, diaphoresis, leg swelling. Other problems review    1. Fibromyalgia -patient had a negative EMG. Patient says duloxetine has helped considerably. She has been practicing yoga which she believes also helps. 2.  Hereditary hemochromatosis - Patient denies complaints or symptoms today. Patient mentions she has upcoming visit with hematologist in 12/2022. Health Maintenance    Last Papsmear-s/p  Total hysterectomy  (21-66 y/o)  Annual retinal eye exam - Patient due  will need to schedule   Annual Dentist visit - 3/2022  Tobacco smoking - NO  Alcohol Misuse - NO  Illicit Drug Use- NO  Healthy Diet and physical activity -  YES  Obesity Screen- screened 8/15/2022  Wears seat belt-  YES  End of life directives discussed with patient. -Mentions she does not have  a will/or/an advanced directives. Was instructed to start process looking into preparing her will an advanced directives.    The 10-year ASCVD risk score (Tamara OLMEDO, et al., 2019) is: 1.6%    Values used to calculate the score:      Age: 49 years      Sex: Female      Is Non- : No      Diabetic: No      Tobacco smoker: No      Systolic Blood Pressure: 221 mmHg      Is BP treated: Yes      HDL Cholesterol: 82 mg/dL      Total Cholesterol: 269 mg/dL     Review of Systems   Constitutional:  Negative for activity change, appetite change, chills, fatigue, fever and unexpected weight change. HENT:  Negative for congestion, ear pain, postnasal drip, sinus pressure, sore throat, tinnitus and trouble swallowing. Eyes:  Negative for pain and redness. Respiratory:  Negative for cough, chest tightness, shortness of breath and wheezing. Cardiovascular:  Negative for chest pain, palpitations and leg swelling. Gastrointestinal:  Negative for abdominal distention, abdominal pain, blood in stool, constipation, diarrhea, nausea and vomiting. Endocrine: Negative for cold intolerance, heat intolerance and polydipsia. Genitourinary:  Negative for decreased urine volume, dysuria, flank pain, frequency, hematuria and urgency. Musculoskeletal:  Negative for arthralgias, back pain, joint swelling, neck pain and neck stiffness. Skin:  Negative for color change and rash. Neurological:  Positive for numbness. Negative for dizziness, weakness and headaches. Hematological:  Negative for adenopathy. Psychiatric/Behavioral:  Negative for behavioral problems, sleep disturbance and suicidal ideas. The patient is not nervous/anxious. BP (!) 144/96 (Site: Right Upper Arm, Position: Sitting, Cuff Size: Medium Adult)   Pulse 90   Resp 14   Ht 5' 8\" (1.727 m)   Wt 155 lb 3.2 oz (70.4 kg)   SpO2 98%   BMI 23.60 kg/m²    Physical Exam  Constitutional:       General: She is not in acute distress. Appearance: Normal appearance. She is not ill-appearing. HENT:      Head: Normocephalic and atraumatic. Right Ear: External ear normal.      Left Ear: External ear normal.      Mouth/Throat:      Mouth: Mucous membranes are moist.      Pharynx: No oropharyngeal exudate or posterior oropharyngeal erythema. Eyes:      Extraocular Movements: Extraocular movements intact.       Conjunctiva/sclera: Conjunctivae normal. Pupils: Pupils are equal, round, and reactive to light. Neck:      Vascular: No carotid bruit. Cardiovascular:      Rate and Rhythm: Normal rate and regular rhythm. Pulses: Normal pulses. Heart sounds: Normal heart sounds. No murmur heard. No gallop. Pulmonary:      Effort: Pulmonary effort is normal.      Breath sounds: Normal breath sounds. No wheezing, rhonchi or rales. Abdominal:      General: Abdomen is flat. There is no distension. Musculoskeletal:         General: No swelling or tenderness. Normal range of motion. Cervical back: No tenderness. Lymphadenopathy:      Cervical: No cervical adenopathy. Skin:     Findings: No erythema, lesion or rash. Neurological:      General: No focal deficit present. Mental Status: She is alert and oriented to person, place, and time. Mental status is at baseline. Cranial Nerves: No cranial nerve deficit. Motor: No weakness. Psychiatric:         Mood and Affect: Mood normal.         Behavior: Behavior normal.         Thought Content: Thought content normal.         Judgment: Judgment normal.       ASSESSMENT/PLAN:  1. Benign essential HTN  Assessment & Plan:  Blood pressure elevated 144/96  Patient says her blood pressure is anxiety driven as she has whitecoat hypertension. She says soon as anxiety improves she anticipates morning blood pressures to be better  Will increase  losartan 100 mg daily  To verify blood pressure cuff accuracy  To keep outpatient BP log, follow-up in 2 weeks for nurse blood pressure visit  counseled on exercise and diet (including DASH diet)  Goal to achieve appropriate BMI  Patient agreed with plan with verbal understanding     Orders:  -     losartan (COZAAR) 100 MG tablet; Take 1 tablet by mouth daily, Disp-90 tablet, R-1Normal  2.  Fibromyalgia  Assessment & Plan:   diagnosis of fibromyalgia  On appropriate medication  Counseled on lifestyle changes and aerobic exercise contributing to 70% of the treatment  Informed that study show raudel chi may be better than aerobic exercise  Patient said duloxetine 60 mg daily has helped considerably  Discussed adding low-dose TCA at bedtime patient declined     3. Hereditary hemochromatosis ([address]  Assessment & Plan:   Patient follows with hematology. 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 Patient used to receive therapeutic phlebotomy, patient says she is at maintenance phase with  Ferritin within normal limits  Has follow-up with hematologist in 12/2022  Follow-up with specialists as per their orders. 4. Mixed hyperlipidemia  Assessment & Plan:  Elevate LDL & HDL  Patient was started on atorvastatin by prior PCP, patient said on taking atorvastatin made  her cervical axillary lymph nodes  feel tender without enlargement. On low dose pravastatin and tolerating it  Will obtain blood work next week  5. 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 (12/7/21)  6. Needs flu shot  -     Influenza, FLUCELVAX, (age 10 mo+), IM, PF, 0.5 mL      Preventative care discussed. Encouraged healthy diet choices, reg exercise. Patient agreed w/plan and verbal understanding. Patient advised to call or return with any concerns or problems or worsening conditions. Go to the ER for any severe or potentially life threatening problems. Return in about 4 months (around 2/27/2023). This note was generated in part or in whole with voice recognition software. Voice recognition is usually quite accurate but there are transcription errors that can often occur. All attempts were made to correct these errors I apologized for any  typographical errors that were not detected and corrected. Electronically signed by Jean Awad.  Stephanie Holbrook MD on 10/27/2022 at 11:31 AM.

## 2022-10-27 NOTE — ASSESSMENT & PLAN NOTE
Elevate LDL & HDL  Patient was started on atorvastatin by prior PCP, patient said on taking atorvastatin made  her cervical axillary lymph nodes  feel tender without enlargement.   On low dose pravastatin and tolerating it  Will obtain blood work next week

## 2022-10-27 NOTE — ASSESSMENT & PLAN NOTE
diagnosis of fibromyalgia  On appropriate medication  Counseled on lifestyle changes and aerobic exercise contributing to 70% of the treatment  Informed that study show raudel chi may be better than aerobic exercise  Patient said duloxetine 60 mg daily has helped considerably  Discussed adding low-dose TCA at bedtime patient declined

## 2022-10-27 NOTE — ASSESSMENT & PLAN NOTE
Blood pressure elevated 144/96  Patient says her blood pressure is anxiety driven as she has whitecoat hypertension.   She says soon as anxiety improves she anticipates morning blood pressures to be better  Will increase  losartan 100 mg daily  To verify blood pressure cuff accuracy  To keep outpatient BP log, follow-up in 2 weeks for nurse blood pressure visit  counseled on exercise and diet (including DASH diet)  Goal to achieve appropriate BMI  Patient agreed with plan with verbal understanding

## 2022-12-03 DIAGNOSIS — E78.2 MIXED HYPERLIPIDEMIA: ICD-10-CM

## 2022-12-05 RX ORDER — PRAVASTATIN SODIUM 10 MG
TABLET ORAL
Qty: 90 TABLET | Refills: 1 | Status: SHIPPED | OUTPATIENT
Start: 2022-12-05

## 2022-12-21 ENCOUNTER — TELEPHONE (OUTPATIENT)
Dept: PRIMARY CARE CLINIC | Age: 47
End: 2022-12-21

## 2022-12-21 NOTE — TELEPHONE ENCOUNTER
Brenda Moore called and said that she believes she is having a flare up of her fibromyalgia. She said her hips,knees,elbows and hands hurt. She she is also having pain in her chest and wanted to know if there was anything she could take for it.

## 2022-12-27 ENCOUNTER — TELEPHONE (OUTPATIENT)
Dept: PRIMARY CARE CLINIC | Age: 47
End: 2022-12-27

## 2022-12-27 DIAGNOSIS — M79.7 FIBROMYALGIA: Primary | ICD-10-CM

## 2022-12-27 RX ORDER — TIZANIDINE 4 MG/1
4 TABLET ORAL 3 TIMES DAILY PRN
Qty: 30 TABLET | Refills: 0 | Status: SHIPPED | OUTPATIENT
Start: 2022-12-27

## 2022-12-27 NOTE — TELEPHONE ENCOUNTER
I could prescribe a muscle relaxant, they are all sedating patient has exercise caution driving. If she agrees I will send prescription.

## 2022-12-27 NOTE — TELEPHONE ENCOUNTER
I called patient regarding a phone call last week stating she is having a fibromyalgia flair up. Patient asking if there is something she can take for this.  Also interested in seeing rheumatology

## 2023-01-25 DIAGNOSIS — M79.2 NEUROPATHIC PAIN: ICD-10-CM

## 2023-01-25 DIAGNOSIS — F41.1 GENERALIZED ANXIETY DISORDER: ICD-10-CM

## 2023-01-25 RX ORDER — DULOXETIN HYDROCHLORIDE 60 MG/1
CAPSULE, DELAYED RELEASE ORAL
Qty: 90 CAPSULE | Refills: 1 | Status: SHIPPED | OUTPATIENT
Start: 2023-01-25

## 2023-01-30 DIAGNOSIS — I10 BENIGN ESSENTIAL HTN: ICD-10-CM

## 2023-01-30 RX ORDER — LOSARTAN POTASSIUM 100 MG/1
100 TABLET ORAL DAILY
Qty: 90 TABLET | Refills: 1 | Status: SHIPPED | OUTPATIENT
Start: 2023-01-30

## 2023-02-22 ENCOUNTER — OFFICE VISIT (OUTPATIENT)
Dept: PRIMARY CARE CLINIC | Age: 48
End: 2023-02-22

## 2023-02-22 VITALS
DIASTOLIC BLOOD PRESSURE: 78 MMHG | RESPIRATION RATE: 14 BRPM | HEART RATE: 90 BPM | BODY MASS INDEX: 21.89 KG/M2 | HEIGHT: 68 IN | SYSTOLIC BLOOD PRESSURE: 116 MMHG | OXYGEN SATURATION: 98 % | WEIGHT: 144.4 LBS

## 2023-02-22 DIAGNOSIS — Z00.00 ENCOUNTER FOR WELL ADULT EXAM WITHOUT ABNORMAL FINDINGS: Primary | ICD-10-CM

## 2023-02-22 DIAGNOSIS — F17.200 VAPING NICOTINE DEPENDENCE, NON-TOBACCO PRODUCT: ICD-10-CM

## 2023-02-22 DIAGNOSIS — R73.09 IMPAIRED GLUCOSE REGULATION: ICD-10-CM

## 2023-02-22 DIAGNOSIS — F41.1 GENERALIZED ANXIETY DISORDER: ICD-10-CM

## 2023-02-22 DIAGNOSIS — R79.0 LOW MAGNESIUM LEVEL: ICD-10-CM

## 2023-02-22 DIAGNOSIS — M79.7 FIBROMYALGIA: ICD-10-CM

## 2023-02-22 DIAGNOSIS — R53.83 OTHER FATIGUE: ICD-10-CM

## 2023-02-22 DIAGNOSIS — E78.2 MIXED HYPERLIPIDEMIA: ICD-10-CM

## 2023-02-22 DIAGNOSIS — E53.8 LOW VITAMIN B12 LEVEL: ICD-10-CM

## 2023-02-22 DIAGNOSIS — Z12.31 ENCOUNTER FOR SCREENING MAMMOGRAM FOR MALIGNANT NEOPLASM OF BREAST: ICD-10-CM

## 2023-02-22 DIAGNOSIS — E83.110 HEREDITARY HEMOCHROMATOSIS (HCC): ICD-10-CM

## 2023-02-22 DIAGNOSIS — I10 BENIGN ESSENTIAL HTN: ICD-10-CM

## 2023-02-22 DIAGNOSIS — Z00.00 ENCOUNTER FOR WELL ADULT EXAM WITHOUT ABNORMAL FINDINGS: ICD-10-CM

## 2023-02-22 DIAGNOSIS — Z00.00 PREVENTATIVE HEALTH CARE: ICD-10-CM

## 2023-02-22 DIAGNOSIS — Z12.11 COLON CANCER SCREENING: ICD-10-CM

## 2023-02-22 LAB
BASOPHILS ABSOLUTE: 0 K/UL (ref 0–0.2)
BASOPHILS RELATIVE PERCENT: 0.6 %
EOSINOPHILS ABSOLUTE: 0.1 K/UL (ref 0–0.6)
EOSINOPHILS RELATIVE PERCENT: 1.5 %
HCT VFR BLD CALC: 39.6 % (ref 36–48)
HEMOGLOBIN: 13.3 G/DL (ref 12–16)
LYMPHOCYTES ABSOLUTE: 1.4 K/UL (ref 1–5.1)
LYMPHOCYTES RELATIVE PERCENT: 25 %
MCH RBC QN AUTO: 33.7 PG (ref 26–34)
MCHC RBC AUTO-ENTMCNC: 33.5 G/DL (ref 31–36)
MCV RBC AUTO: 100.6 FL (ref 80–100)
MONOCYTES ABSOLUTE: 0.5 K/UL (ref 0–1.3)
MONOCYTES RELATIVE PERCENT: 8.2 %
NEUTROPHILS ABSOLUTE: 3.6 K/UL (ref 1.7–7.7)
NEUTROPHILS RELATIVE PERCENT: 64.7 %
PDW BLD-RTO: 12.7 % (ref 12.4–15.4)
PLATELET # BLD: 325 K/UL (ref 135–450)
PMV BLD AUTO: 8 FL (ref 5–10.5)
RBC # BLD: 3.94 M/UL (ref 4–5.2)
WBC # BLD: 5.6 K/UL (ref 4–11)

## 2023-02-22 SDOH — ECONOMIC STABILITY: INCOME INSECURITY: HOW HARD IS IT FOR YOU TO PAY FOR THE VERY BASICS LIKE FOOD, HOUSING, MEDICAL CARE, AND HEATING?: NOT HARD AT ALL

## 2023-02-22 SDOH — ECONOMIC STABILITY: FOOD INSECURITY: WITHIN THE PAST 12 MONTHS, THE FOOD YOU BOUGHT JUST DIDN'T LAST AND YOU DIDN'T HAVE MONEY TO GET MORE.: NEVER TRUE

## 2023-02-22 SDOH — ECONOMIC STABILITY: HOUSING INSECURITY
IN THE LAST 12 MONTHS, WAS THERE A TIME WHEN YOU DID NOT HAVE A STEADY PLACE TO SLEEP OR SLEPT IN A SHELTER (INCLUDING NOW)?: NO

## 2023-02-22 SDOH — ECONOMIC STABILITY: FOOD INSECURITY: WITHIN THE PAST 12 MONTHS, YOU WORRIED THAT YOUR FOOD WOULD RUN OUT BEFORE YOU GOT MONEY TO BUY MORE.: NEVER TRUE

## 2023-02-22 ASSESSMENT — ENCOUNTER SYMPTOMS
TROUBLE SWALLOWING: 0
VOMITING: 0
NAUSEA: 0
SORE THROAT: 0
SHORTNESS OF BREATH: 0
EYE PAIN: 0
SINUS PRESSURE: 0
BLOOD IN STOOL: 0
CONSTIPATION: 0
ABDOMINAL DISTENTION: 0
WHEEZING: 0
BACK PAIN: 0
COUGH: 0
EYE REDNESS: 0
ABDOMINAL PAIN: 0
CHEST TIGHTNESS: 0
COLOR CHANGE: 0
DIARRHEA: 0

## 2023-02-22 ASSESSMENT — PATIENT HEALTH QUESTIONNAIRE - PHQ9
SUM OF ALL RESPONSES TO PHQ QUESTIONS 1-9: 0
SUM OF ALL RESPONSES TO PHQ9 QUESTIONS 1 & 2: 0
2. FEELING DOWN, DEPRESSED OR HOPELESS: 0
1. LITTLE INTEREST OR PLEASURE IN DOING THINGS: 0
SUM OF ALL RESPONSES TO PHQ QUESTIONS 1-9: 0

## 2023-02-22 NOTE — ASSESSMENT & PLAN NOTE
Patient counseled on the benefits of Vaping  cessation including but not limited to reducing the incidence of lung cancer, COPD, jeffy-pharyngeal cancer, laryngeal cancer, Nicotine addiction, Severe lung injury, Seizures, Cryptogenic organizing pneumonia (), Popcorn lung, Pancreatic cancer, kidney/bladder cancer, osteoporosis, Heart disease, CVA, PAD.

## 2023-02-22 NOTE — PATIENT INSTRUCTIONS
Well Visit, Ages 25 to 72: Care Instructions  Well visits can help you stay healthy. Your doctor has checked your overall health and may have suggested ways to take good care of yourself. Your doctor also may have recommended tests. You can help prevent illness with healthy eating, good sleep, vaccinations, regular exercise, and other steps. Get the tests that you and your doctor decide on. Depending on your age and risks, examples might include screening for diabetes; hepatitis C; HIV; and cervical, breast, lung, and colon cancer. Screening helps find diseases before any symptoms appear. Eat healthy foods. Choose fruits, vegetables, whole grains, lean protein, and low-fat dairy foods. Limit saturated fat and reduce salt. Limit alcohol. Men should have no more than 2 drinks a day. Women should have no more than 1. For some people, no alcohol is the best choice. Exercise. Get at least 30 minutes of exercise on most days of the week. Walking can be a good choice. Reach and stay at your healthy weight. This will lower your risk for many health problems. Take care of your mental health. Try to stay connected with friends, family, and community, and find ways to manage stress. If you're feeling depressed or hopeless, talk to someone. A counselor can help. If you don't have a counselor, talk to your doctor. Talk to your doctor if you think you may have a problem with alcohol or drug use. This includes prescription medicines and illegal drugs. Avoid tobacco and nicotine: Don't smoke, vape, or chew. If you need help quitting, talk to your doctor. Practice safer sex. Getting tested, using condoms or dental dams, and limiting sex partners can help prevent STIs. Use birth control if it's important to you to prevent pregnancy. Talk with your doctor about your choices and what might be best for you. Prevent problems where you can.  Protect your skin from too much sun, wash your hands, brush your teeth twice a day, and wear a seat belt in the car. Where can you learn more? Go to http://www.moss.com/ and enter P072 to learn more about \"Well Visit, Ages 25 to 72: Care Instructions. \"  Current as of: March 9, 2022               Content Version: 13.5  © 2249-2800 [employer]. Care instructions adapted under license by [facility] ([facility]). If you have questions about a medical condition or this instruction, always ask your healthcare professional. [address] any warranty or liability for your use of this information. Learning About Changing a Habit by Setting Goals  How can you change a habit? If you've decided to change a habit--whether it's quitting smoking, lowering your blood pressure, becoming more active, or doing something else to improve your health--congratulations! Making that decision is the first step toward making a change. What happens next? Have a reason. Set goals you can reach. Prepare for slip-ups. And get support. What's your reason? Your reason for wanting to change a habit is really important. Maybe you want to quit smoking so that you can avoid future health problems. Or maybe you want to eat a healthier diet so you can lose weight. If you have high blood pressure, your reason may be clear: to lower your blood pressure. Maybe you smoke and want to save money on cigarettes. You need to feel ready to make a change. If you don't feel ready now, that's okay. You can still be thinking and planning. When you truly want to make changes, you're ready for the next step. It's not easy to change habits--but you can do it. Taking the time to really think about what will motivate or inspire you will help you reach your goals. How do you set goals? Setting goals can help a lot when you're trying to make a healthy change. Focus on small goals. This will help you reach larger goals over time.  With smaller goals, you'll have success more often, which will help you stay with it. For example, your large goal may be to lose 20 pounds. Your small goal could be to lose 5. Write down your goals. This will help you remember, and you'll have a clearer idea of what you want to achieve. Use a journal or notebook to record your goals. Hang up your plan where you will see it often as a reminder of what you're trying to do. Make your goals specific. Specific goals help you measure your progress. For example, setting a goal to eat one extra serving of vegetables a day is better than a general goal to \"eat more vegetables. \"  Focus on one goal at a time. By doing this, you're less likely to feel overwhelmed and then give up. When you reach a goal, reward yourself. Celebrate your new behavior and success for several days, and then think about setting your next goal.  How can you prepare for slip-ups? It's perfectly normal to try to change a habit, go along fine for a while, and then have a setback. Lots of people try and try again before they reach their goals. What are the things that might cause a setback for you? If you have tried to change a habit before, think about what helped you and what got in your way. By thinking about these barriers now, you can plan ahead for how to deal with them if they happen. There will be times when you slip up and don't make your goal for the week. When that happens, don't get mad at yourself. Learn from the experience. Ask yourself what got in the way of reaching your goal. Positive thinking goes a long way when you're making lifestyle changes. How can you get support? Get a partner. It's motivating to know that someone is trying to make the same change that you're making, like being more active or changing your eating habits. You have someone who is counting on you to help them succeed. That person can also remind you how far you've come. Get friends and family involved. They can exercise with you.  Or they can encourage you by saying how they admire what you are doing. Family members can join you in your healthy eating efforts. Don't be afraid to tell family and friends that their encouragement makes a big difference to you. Join a class or support group. People in these groups often have some of the same barriers you have. They can give you support when you don't feel like staying with your plan. They can boost your morale when you need a lift. Kaylin Ruiz also find a number of online support groups. Encourage yourself. When you feel like giving up, don't waste energy feeling bad about yourself. Remember your reason for wanting to change, think about the progress you've made, and give yourself a pep talk and a pat on the back. Get professional help. A dietitian can help you make your diet healthier while still allowing you to eat foods that you enjoy. A  or physical therapist can help design an exercise program that is fun and easy to stay on. A counselor, a , or your doctor can help you overcome hurdles, reduce stress, or quit smoking. Where can you learn more? Go to http://www.woods.com/ and enter J853 to learn more about \"Learning About Changing a Habit by Setting Goals. \"  Current as of: February 9, 2022               Content Version: 13.5  © 2748-6508 [employer]. Care instructions adapted under license by [facility] ([facility]). If you have questions about a medical condition or this instruction, always ask your healthcare professional. [address] any warranty or liability for your use of this information. A Healthy Lifestyle: Care Instructions  A healthy lifestyle can help you feel good, have more energy, and stay at a weight that's healthy for you. You can share a healthy lifestyle with your friends and family. And you can do it on your own. Eat meals with your friends or family. You could try cooking together.    Plan activities with other people. Go for a walk with a friend, try a free online fitness class, or join a sports league. Eat a variety of healthy foods. These include fruits, vegetables, whole grains, low-fat dairy, and lean protein. Choose healthy portions of food. You can use the Nutrition Facts label on food packages as a guide. Eat more fruits and vegetables. You could add vegetables to sandwiches or add fruit to cereal.   Drink water when you are thirsty. Limit soda, juice, and sports drinks. Try to exercise most days. Aim for at least 2½ hours of exercise each week. Keep moving. Work in the garden or take your dog on a walk. Use the stairs instead of the elevator. If you use tobacco or nicotine, try to quit. Ask your doctor about programs and medicines to help you quit. Limit alcohol. Men should have no more than 2 drinks a day. Women should have no more than 1. For some people, no alcohol is the best choice. Follow-up care is a key part of your treatment and safety. Be sure to make and go to all appointments, and call your doctor if you are having problems. It's also a good idea to know your test results and keep a list of the medicines you take. Where can you learn more? Go to http://www.moss.com/ and enter U807 to learn more about \"A Healthy Lifestyle: Care Instructions. \"  Current as of: March 9, 2022               Content Version: 13.5  © 7614-5371 Healthwise, Incorporated. Care instructions adapted under license by [facility] ([facility]). If you have questions about a medical condition or this instruction, always ask your healthcare professional. [place] [address] any warranty or liability for your use of this information. Heart-Healthy Diet   Sodium, Fat, and Cholesterol Controlled Diet       What Is a Heart Healthy Diet? A heart-healthy diet is one that limits sodium , certain types of fat , and cholesterol .  This type of diet is recommended for:   People with any form of cardiovascular disease (eg, coronary heart disease , peripheral vascular disease , previous heart attack , previous stroke )   People with risk factors for cardiovascular disease, such as high blood pressure , high cholesterol , or diabetes   Anyone who wants to lower their risk of developing cardiovascular disease   Sodium    Sodium is a mineral found in many foods. In general, most people consume much more sodium than they need. Diets high in sodium can increase blood pressure and lead to edema (water retention). On a heart-healthy diet, you should consume no more than 2,300 mg (milligrams) of sodium per dayabout the amount in one teaspoon of table salt. The foods highest in sodium include table salt (about 50% sodium), processed foods, convenience foods, and preserved foods. Cholesterol    Cholesterol is a fat-like, waxy substance in your blood. Our bodies make some cholesterol. It is also found in animal products, with the highest amounts in fatty meat, egg yolks, whole milk, cheese, shellfish, and organ meats. On a heart-healthy diet, you should limit your cholesterol intake to less than 200 mg per day. It is normal and important to have some cholesterol in your bloodstream. But too much cholesterol can cause plaque to build up within your arteries, which can eventually lead to a heart attack or stroke. The two types of cholesterol that are most commonly referred to are:   Low-density lipoprotein (LDL) cholesterol  Also known as bad cholesterol, this is the cholesterol that tends to build up along your arteries. Bad cholesterol levels are increased by eating fats that are saturated or hydrogenated. Optimal level of this cholesterol is less than 100. Over 130 starts to get risky for heart disease.    High-density lipoprotein (HDL) cholesterol  Also known as good cholesterol, this type of cholesterol actually carries cholesterol away from your arteries and may, therefore, help lower your risk of having a heart attack. You want this level to be high (ideally greater than 60). It is a risk to have a level less than 40. You can raise this good cholesterol by eating olive oil, canola oil, avocados, or nuts. Exercise raises this level, too. Fat    Fat is calorie dense and packs a lot of calories into a small amount of food. Even though fats should be limited due to their high calorie content, not all fats are bad. In fact, some fats are quite healthful. Fat can be broken down into four main types. The good-for-you fats are:   Monounsaturated fat  found in oils such as olive and canola, avocados, and nuts and natural nut butters; can decrease cholesterol levels, while keeping levels of HDL cholesterol high   Polyunsaturated fat  found in oils such as safflower, sunflower, soybean, corn, and sesame; can decrease total cholesterol and LDL cholesterol   Omega-3 fatty acids  particularly those found in fatty fish (such as salmon, trout, tuna, mackerel, herring, and sardines); can decrease risk of arrhythmias, decrease triglyceride levels, and slightly lower blood pressure   The fats that you want to limit are:   Saturated fat  found in animal products, many fast foods, and a few vegetables; increases total blood cholesterol, including LDL levels   Animal fats that are saturated include: butter, lard, whole-milk dairy products, meat fat, and poultry skin   Vegetable fats that are saturated include: hydrogenated shortening, palm oil, coconut oil, cocoa butter   Hydrogenated or trans fat  found in margarine and vegetable shortening, most shelf stable snack foods, and fried foods; increases LDL and decreases HDL     It is generally recommended that you limit your total fat for the day to less than 30% of your total calories. If you follow an 1800-calorie heart healthy diet, for example, this would mean 60 grams of fat or less per day.    Saturated fat and trans fat in your diet raises your blood cholesterol the most, much more than dietary cholesterol does. For this reason, on a heart-healthy diet, less than 7% of your calories should come from saturated fat and ideally 0% from trans fat. On an 1800-calorie diet, this translates into less than 14 grams of saturated fat per day, leaving 46 grams of fat to come from mono- and polyunsaturated fats.    Food Choices on a Heart Healthy Diet   Food Category   Foods Recommended   Foods to Avoid   Grains   Breads and rolls without salted tops Most dry and cooked cereals Unsalted crackers and breadsticks Low-sodium or homemade breadcrumbs or stuffing All rice and pastas   Breads, rolls, and crackers with salted tops High-fat baked goods (eg, muffins, donuts, pastries) Quick breads, self-rising flour, and biscuit mixes Regular bread crumbs Instant hot cereals Commercially prepared rice, pasta, or stuffing mixes   Vegetables   Most fresh, frozen, and low-sodium canned vegetables Low-sodium and salt-free vegetable juices Canned vegetables if unsalted or rinsed   Regular canned vegetables and juices, including sauerkraut and pickled vegetables Frozen vegetables with sauces Commercially prepared potato and vegetable mixes   Fruits   Most fresh, frozen, and canned fruits All fruit juices   Fruits processed with salt or sodium   Milk   Nonfat or low-fat (1%) milk Nonfat or low-fat yogurt Cottage cheese, low-fat ricotta, cheeses labeled as low-fat and low-sodium   Whole milk Reduced-fat (2%) milk Malted and chocolate milk Full fat yogurt Most cheeses (unless low-fat and low salt) Buttermilk (no more than 1 cup per week)   Meats and Beans   Lean cuts of fresh or frozen beef, veal, lamb, or pork (look for the word loin) Fresh or frozen poultry without the skin Fresh or frozen fish and some shellfish Egg whites and egg substitutes (Limit whole eggs to three per week) Tofu Nuts or seeds (unsalted, dry-roasted), low-sodium peanut butter Dried peas, beans, and lentils   Any smoked, cured, salted, or canned meat, fish, or poultry (including diez, chipped beef, cold cuts, hot dogs, sausages, sardines, and anchovies) Poultry skins Breaded and/or fried fish or meats Canned peas, beans, and lentils Salted nuts   Fats and Oils   Olive oil and canola oil Low-sodium, low-fat salad dressings and mayonnaise   Butter, margarine, coconut and palm oils, diez fat   Snacks, Sweets, and Condiments   Low-sodium or unsalted versions of broths, soups, soy sauce, and condiments Pepper, herbs, and spices; vinegar, lemon, or lime juice Low-fat frozen desserts (yogurt, sherbet, fruit bars) Sugar, cocoa powder, honey, syrup, jam, and preserves Low-fat, trans-fat free cookies, cakes, and pies [place] and animal crackers, fig bars, joy snaps   High-fat desserts Broth, soups, gravies, and sauces, made from instant mixes or other high-sodium ingredients Salted snack foods Canned olives Meat tenderizers, seasoning salt, and most flavored vinegars   Beverages   Low-sodium carbonated beverages Tea and coffee in moderation Soy milk   Commercially softened water   Suggestions   Make whole grains, fruits, and vegetables the base of your diet. Choose heart-healthy fats such as canola, olive, and flaxseed oil, and foods high in heart-healthy fats, such as nuts, seeds, soybeans, tofu, and fish. Eat fish at least twice per week; the fish highest in omega-3 fatty acids and lowest in mercury include salmon, herring, mackerel, sardines, and canned chunk light tuna. If you eat fish less than twice per week or have high triglycerides, talk to your doctor about taking fish oil supplements. Read food labels. For products low in fat and cholesterol, look for fat free, low-fat, cholesterol free, saturated fat free, and trans fat freeAlso scan the Nutrition Facts Label, which lists saturated fat, trans fat, and cholesterol amounts.    For products low in sodium, look for sodium free, very low sodium, low sodium, no added salt, and unsalted   Skip the salt when cooking or at the table; if food needs more flavor, get creative and try out different herbs and spices. Garlic and onion also add substantial flavor to foods. Trim any visible fat off meat and poultry before cooking, and drain the fat off after christina. Use cooking methods that require little or no added fat, such as grilling, boiling, baking, poaching, broiling, roasting, steaming, stir-frying, and sauting. Avoid fast food and convenience food. They tend to be high in saturated and trans fat and have a lot of added salt. Talk to a registered dietitian for individualized diet advice. Last Reviewed: March 2011 Chiki Chaudhary MS, MPH, RD   Updated: 3/29/2011     WELL ADULT LIFESTYLE INSTRUCTIONS:    Kaitlynn Cohen a day in the next week to spend an hour reviewing the information below then:     1) determine your health goals for the year   2) determine what changes you need to achieve those goals   3) design your daily routine, shopping habits etc to implement those changes        Default Right Action (no choices)       Make it EASY to do the RIGHT THINGS. 4) I invite you to send me your plans via [employer] so I can continue to help you       with them    Examine your lifestyle with an emphasis on BARRIERS to bad and good habits and how you can design your life to make better choices. If you want to feel better these are the FUNDAMENTAL PILLARS of Wellness:    1)  You can choose to Get 150 min/week of moderate exercise (can talk but can't        sing) or 75 min/week of vigorous exercise (can't talk). This will enhance your sense of well being (Exercise is as good as medicine for   depression.)    2)  You can choose to Get 7-9 hours of sleep per night    Detoxifies your brain, reduces risk of dementia    3)  You can choose to Strength Train 2 x a week on non-consecutive days   This will improve function and reduce risk of injury.    Body weight type exercises   such as Yoga and Pilates are excellent choices. 4)  You can choose Good Nutrition. Only eat your goal weight (in lbs) x 10        calories/day and get 5 servings of Vegetables/day   Plant based diets reduce risk of heart attack/stroke and will help you feel full on   less food. Avoid highly processed foods and processed carbohydrates. 5)  You can choose Moderate alcohol intake < 1-2 drinks/day   Alcohol will disrupt your sleep and add calories to your day. 6)  You can choose to Develop a Charismatic/Supportive relationship. This will strengthen your resilience for the ups and downs. 7)  You can choose to Practice Mindfulness. An hour a day of prayer/meditation/gratitude will change your life! If you are trying to lose weight, here are some recommendations for weight loss:  Not every weight loss program is appropriate for everybody. ..  good online sources include [employer] (more social with daily check ins), [employer] (similar but less social) and Naturally slim, as well as Brand[employer] ($1500)    The GI Diet or \"Primal diet\", Intermittent fasting can also be effective choices. If you have diabetes treated with insulin be sure to ask for specific guidance around meals. Take your desired weight in pounds and multiply by 10 and that is your average daily calorie allowance. For example if you wish to weigh 170 lb x 10 = 1700 conner/day (this is how to gradually lose the weight and maintain your desired weight). Avoid soda/coke and all \"wet carbs\" => Drink ice water instead    Drink a large glass of ice water before meals and EAT SLOWLY (talk while you eat)! Rethink your hunger => it means your losing weight. Minimize highly processed carbohydrates as they stimulate your appetite:  Specifically cut back on Bread, Rice, Pasta and Potatoes    Avoid eating calories after 6 pm        Preventing Osteoporosis: After Your Visit  Your Care Instructions  Osteoporosis means the bones are weak and thin enough that they can break easily.  The older you are, the more likely you are to get osteoporosis. But with plenty of calcium, vitamin D, and exercise, you can help prevent osteoporosis. The preteen and teen years are a key time for bone building. With the help of calcium, vitamin D, and exercise in those early years and beyond, the bones reach their peak density and strength by age 27. After age 27, your bones naturally start to thin and weaken. The stronger your bones are at around age 27, the lower your risk for osteoporosis. But no matter what your age and risk are, your bones still need calcium, vitamin D, and exercise to stay strong. Also avoid smoking, and limit alcohol. Smoking and heavy alcohol use can make your bones thinner. Talk to your doctor about any special risks you might have, such as having a close relative with osteoporosis or taking a medicine that can weaken bones. Your doctor can tell you the best ways to protect your bones from thinning. Follow-up care is a key part of your treatment and safety. Be sure to make and go to all appointments, and call your doctor if you are having problems. It's also a good idea to know your test results and keep a list of the medicines you take. How can you care for yourself at home? Get enough calcium and vitamin D. The [place] of Medicine recommends adults younger than age 46 need 1,000 mg of calcium and 600 IU of vitamin D each day. Women ages 46 to 79 need 1,200 mg of calcium and 600 IU of vitamin D each day. Men ages 46 to 79 need 1,000 mg of calcium and 600 IU of vitamin D each day. Adults 71 and older need 1,200 mg of calcium and 800 IU of vitamin D each day. Eat foods rich in calcium, like yogurt, cheese, milk, and dark green vegetables. Eat foods rich in vitamin D, like eggs, fatty fish, cereal, and fortified milk. Get some sunshine. Your body uses sunshine to make its own vitamin D. The safest time to be out in the sun is before 10 a.m. or after 3 p.m. Avoid getting sunburned. Sunburn can increase your risk of skin cancer. Talk to your doctor about taking a calcium plus vitamin D supplement. Ask about what type of calcium is right for you, and how much to take at a time. Adults ages 23 to 48 should not get more than 2,500 mg of calcium and 4,000 IU of vitamin D each day, whether it is from supplements and/or food. Adults ages 46 and older should not get more than 2,000 mg of calcium and 4,000 IU of vitamin D each day from supplements and/or food. Get regular bone-building exercise. Weight-bearing and resistance exercises keep bones healthy by working the muscles and bones against gravity. Start out at an exercise level that feels right for you. Add a little at a time until you can do the following:  Do 30 minutes of weight-bearing exercise on most days of the week. Walking, jogging, stair climbing, and dancing are good choices. Do resistance exercises with weights or elastic bands 2 to 3 days a week. Limit alcohol. Drink no more than 1 alcohol drink a day if you are a woman. Drink no more than 2 alcohol drinks a day if you are a man. Do not smoke. Smoking can make bones thin faster. If you need help quitting, talk to your doctor about stop-smoking programs and medicines. These can increase your chances of quitting for good. When should you call for help? Watch closely for changes in your health, and be sure to contact your doctor if:  You need help with a healthy eating plan. You need help with an exercise plan    © 2598-7912 [employer]. Care instructions adapted under license by [facility]. This care instruction is for use with your licensed healthcare professional. If you have questions about a medical condition or this instruction, always ask your healthcare professional. [place] [address] any warranty or liability for your use of this information. Content Version: 9.4.87416;  Last Revised: June 20, 2011                  DASH Diet: After Your Visit  Your Care Instructions  The DASH diet is an eating plan that can help lower your blood pressure. DASH stands for Dietary Approaches to Stop Hypertension. Hypertension is high blood pressure. The DASH diet focuses on eating foods that are high in calcium, potassium, and magnesium. These nutrients can lower blood pressure. The foods that are highest in these nutrients are fruits, vegetables, low-fat dairy products, nuts, seeds, and legumes. But taking calcium, potassium, and magnesium supplements instead of eating foods that are high in those nutrients does not have the same effect. The DASH diet also includes whole grains, fish, and poultry. The DASH diet is one of several lifestyle changes your doctor may recommend to lower your high blood pressure. Your doctor may also want you to decrease the amount of sodium in your diet. Lowering sodium while following the DASH diet can lower blood pressure even further than just the DASH diet alone. Follow-up care is a key part of your treatment and safety. Be sure to make and go to all appointments, and call your doctor if you are having problems. Its also a good idea to know your test results and keep a list of the medicines you take. How can you care for yourself at home? Following the DASH diet  Eat 4 to 5 servings of fruit each day. A serving is 1 medium-sized piece of fruit, ½ cup chopped or canned fruit, 1/4 cup dried fruit, or 4 ounces (½ cup) of fruit juice. Choose fruit more often than fruit juice. Eat 4 to 5 servings of vegetables each day. A serving is 1 cup of lettuce or raw leafy vegetables, ½ cup of chopped or cooked vegetables, or 4 ounces (½ cup) of vegetable juice. Choose vegetables more often than vegetable juice. Get 2 to 3 servings of low-fat and fat-free dairy each day. A serving is 8 ounces of milk, 1 cup of yogurt, or 1 ½ ounces of cheese. Eat 7 to 8 servings of grains each day.  A serving is 1 slice of bread, 1 ounce of dry cereal, or ½ cup of cooked rice, pasta, or cooked cereal. Try to choose whole-grain products as much as possible. Limit lean meat, poultry, and fish to 6 ounces each day. Six ounces is about the size of two decks of cards. Eat 4 to 5 servings of nuts, seeds, and legumes (cooked dried beans, lentils, and split peas) each week. A serving is 1/3 cup of nuts, 2 tablespoons of seeds, or ½ cup cooked dried beans or peas. Limit sweets and added sugars to 5 servings or less a week. A serving is 1 tablespoon jelly or jam, ½ cup sorbet, or 1 cup of lemonade. Tips for success  Start small. Do not try to make dramatic changes to your diet all at once. You might feel that you are missing out on your favorite foods and then be more likely to not follow the plan. Make small changes, and stick with them. Once those changes become habit, add a few more changes. Try some of the following:  Make it a goal to eat a fruit or vegetable at every meal and at snacks. This will make it easy to get the recommended amount of fruits and vegetables each day. Try yogurt topped with fruit and nuts for a snack or healthy dessert. Add lettuce, tomato, cucumber, and onion to sandwiches. Combine a ready-made pizza crust with low-fat mozzarella cheese and lots of vegetable toppings. Try using tomatoes, squash, spinach, broccoli, carrots, cauliflower, and onions. Have a variety of cut-up vegetables with a low-fat dip as an appetizer instead of chips and dip. Sprinkle sunflower seeds or chopped almonds over salads. Or try adding chopped walnuts or almonds to cooked vegetables. Try some vegetarian meals using beans and peas. Add garbanzo or kidney beans to salads. Make burritos and tacos with mashed watson beans or black beans    © 0508-6086 [employer]. Care instructions adapted under license by [facility].  This care instruction is for use with your licensed healthcare professional. If you have questions about a medical condition or this instruction, always ask your healthcare professional. [place] [address] any warranty or liability for your use of this information. Content Version: 9.4.70939; Last Revised: March 15, 2012                Alcohol Abuse and Alcoholism   (Alcohol Dependence; Alcohol Use Disorder)       Definition   Alcohol abuse is excessive or problematic alcohol consumption. It can progress to alcoholism. Alcoholism is chronic alcohol abuse that results in a physical dependence on alcohol (withdrawal symptoms) and an inability to stop or limit drinking. Causes   Several factors can contribute to alcohol abuse and alcoholism, including:   Genes   Brain chemicals that may be different than normal   Social pressure   Emotional stress   Pain   Depression and other mental health problems   Problem drinking behaviors learned from family or friends   Risk Factors   These factors increase your chance of developing alcoholism. Tell your doctor if you have any of these risk factors:   Sex: male   Family members who abuse alcohol (especially men whose fathers or brothers are alcoholic)   Starting to use alcohol at an early age (younger than 15)   Using illicit drugs or non-medical use of prescription drugs   Peer pressure   Easy access to alcoholic beverages   Psychiatric disorders, such as depression or anxiety   Smoking   Symptoms   It is common to deny an alcohol problem. Alcohol abuse can occur without physical dependence.    Alcohol abuse symptoms include:   Repeated work, school, or home problems due to drinking   Risking physical safety   Recurring trouble with the law, often including drinking and driving   Continuing to drink despite alcohol-related difficulties   Symptoms of alcoholism include:   Craving a drink   Unable to stop or limit drinking   Needing greater amounts of alcohol to feel the same effect   Giving up activities in order to drink or recover from alcohol   Drinking that continues even when it causes or worsens health problems   Wanting to stop or reduce drinking, but not being able   Withdrawal symptoms if alcohol is stopped include:   Nausea   Sweating   Shaking   Anxiety   Increased blood pressure   Seizures ( delirium tremens [DTs])   The brain, nervous system, heart, liver, stomach, gastrointestinal tract, and pancreas can all be damaged by alcoholism. Some of the Organs Damaged in Alcohol Abuse        2011 [facility].   Diagnosis   Doctors ask a series of questions to assess possible alcohol-related problems, including:   Have you tried to reduce your drinking? Have you felt bad about drinking? Have you been annoyed by another person's criticism of your drinking? Do you drink in the morning to steady your nerves or cure a hangover? Do you have problems with a job, your family, or the law? Do you drive under the influence of alcohol? Blood tests may be done to:   Look at the size of your red blood cells and to check for a substance called carbohydrate-deficient transferrin   Check for alcohol-related liver disease and other health problems   Treatment   Treatment for alcohol abuse or dependence is aimed at teaching patients how to manage the disease. Most professionals believe that this means giving up alcohol completely and permanently. The first and most important step is recognizing a problem exists. Successful treatment depends on your desire to change. Your doctor can help you withdraw from alcohol safely. This could require hospitalization in a detoxification center. They will carefully monitor you for side effects. You may need medication while you are undergoing detoxification. Treatments include:   Medications    Drugs can help relieve some of the symptoms of withdrawal and help prevent relapse. The doctor may prescribe medication to reduce cravings for alcohol.    Medications used to treat alcoholism and to try to prevent drinking include:   Naltrexone (ReVia, Vivitrol)blocks the high that makes you crave alcohol   Disulfiram (Antabuse)makes you very sick if you drink alcohol   Acamprosate (Campral)reduces your craving for alcohol   A study showed that an anticonvulsant drug, topiramate (Topamax), may reduce alcohol dependence. Education and Counseling    Therapy helps you to recognize alcohol's dangers. Education raises awareness of underlying issues and lifestyles that promote drinking. In therapy, you work to improve coping skills and learn other ways of dealing with stress or pain. Mentoring and Community Help    Alcoholics Anonymous (AA) helps many people to stop drinking and stay sober. Members meet regularly and support each other. Your family members may also benefit from attending meetings of Al-Tracy. Living with an alcoholic can be a painful, stressful situation. Here are some general statistics on treatment outcomes of individuals one year after attempting to stop drinkin/3 remained abstinent   1/3 resumed drinking but at a lower level   1/3 relapsed completely   If you are diagnosed with alcohol abuse or alcoholism, follow your doctor's instructions . Prevention   Realizing that alcohol causes problems helps some people avoid it. Suggestions to decrease the risk of alcohol abuse and dependence include:   Socialize without alcohol. Avoid going to bars. Do not keep alcohol in your home. Avoid situations and people that encourage drinking. Make new nondrinking friends. Do fun things that do not involve alcohol. Avoid reaching for a drink when stressed or upset. Limit your alcohol intake to a moderate level.    Moderate is two or fewer drinks per day for men and one or fewer for women and older adults   A 12-ounce bottle of beer, a five-ounce glass of wine, or 1.5 ounces of liquor is considered one drink   If you are a parent, having a good relationship with your children may reduce their risk of alcohol abuse.   Most professionals who treat alcohol abuse and dependence believe that complete abstinence is the only effective prevention. Last Reviewed: September 2010 Abelardo Santos MD, PhD, MPH   Updated: 9/20/2010     Patient information: Weight loss treatments    INTRODUCTION -- Obesity is a major international problem, and Americans are among the heaviest people in the world. The percentage of obese people in the [place] has risen steadily. Many people find that although they initially lose weight by dieting, they quickly regain the weight after the diet ends. Because it so hard to keep weight off over time, it is important to have as much information and support as possible before starting a diet. You are most likely to be successful in losing weight and keeping it off when you believe that your body weight can be controlled. STARTING A WEIGHT LOSS PROGRAM -- Some people like to talk to their doctor or nurse to get help choosing the best plan, monitoring progress, and getting advice and support along the way. To know what treatment (or combination of treatments) will work best, determine your body mass index (BMI) and waist circumference (measurement). The BMI is calculated from your height and weight. A person with a BMI between 25 and 29.9 is considered overweight   A person with a BMI of 30 or greater is considered to be obese  A waist circumference greater than 35 inches (88 cm) in women and 40 inches (102 cm) in men increases the risk of obesity-related complications, such as heart disease and diabetes. People who are obese and who have a larger waist size may need more aggressive weight loss treatment than others. Talk to your doctor or nurse for advice. Types of treatment -- Based on your measurements and your medical history, your doctor or nurse can determine what combination of weight loss treatments would work best for you.  Treatments may include changes in lifestyle, exercise, dieting, and, in some cases, weight loss medicines or weight loss surgery. Weight loss surgery, also called bariatric surgery, is reserved for people with severe obesity who have not responded to other weight loss treatments. SETTING A WEIGHT LOSS GOAL -- It is important to set a realistic weight loss goal. Your first goal should be to avoid gaining more weight and staying at your current weight (or within 5 percent). Many people have a \"dream\" weight that is difficult or impossible to achieve. People at high risk of developing diabetes who are able to lose 5 percent of their body weight and maintain this weight will reduce their risk of developing diabetes by about 50 percent and reduce their blood pressure. This is a success. Losing more than 15 percent of your body weight and staying at this weight is an extremely good result, even if you never reach your \"dream\" or \"ideal\" weight. LIFESTYLE CHANGES -- Programs that help you to change your lifestyle are usually run by psychologists or other professionals. The goals of lifestyle changes are to help you change your eating habits, become more active, and be more aware of how much you eat and exercise, helping you to make healthier choices. This type of treatment can be broken down into three steps: The triggers that make you want to eat   Eating   What happens after you eat  Triggers to eat -- Determining what triggers you to eat involves figuring out what foods you eat and where and when you eat. To figure out what triggers you to eat, keep a record for a few days of everything you eat, the places where you eat, how often you eat, and the emotions you were feeling when you ate. For some people, the trigger is related to a certain time of day or night. For others, the trigger is related to a certain place, like sitting at a desk working. Eating -- You can change your eating habits by breaking the chain of events between the trigger for eating and eating itself. There are many ways to do this. For instance, you can:  Limit where you eat to a few places (eg, dining room)   Restrict the number of utensils (eg, only a fork) used for eating   Drink a sip of water between each bite   Chew your food a certain number of times   Get up and stop eating every few minutes  What happens after you eat -- Rewarding yourself for good eating behaviors can help you to develop better habits. This is not a reward for weight loss; instead, it is a reward for changing unhealthy behaviors. Do not use food as a reward. Some people find money, clothing, or personal care (eg, a hair cut, manicure, or massage) to be effective rewards. Treat yourself immediately after making better eating choices to reinforce the value of the good behavior. You need to have clear behavior goals, and you must have a time frame for reaching your goals. Reward small changes along the way to your final goal.  Other factors that contribute to successful weight loss -- Changing your behavior involves more than just changing unhealthy eating habits; it also involves finding people around you to support your weight loss, reducing stress, and learning to be strong when tempted by food. Establish a \"michael\" system -- Having a friend or family member available to provide support and reinforce good behavior is very helpful. The support person needs to understand your goals. Learn to be strong -- Learning to be strong when tempted by food is an important part of losing weight. As an example, you will need to learn how to say \"no\" and continue to say no when urged to eat at parties and social gatherings. Develop strategies for events before you go, such as eating before you go or taking low-calorie snacks and drinks with you. Develop a support system -- Having a support system is helpful when losing weight. This is why many commercial groups are successful.  Family support is also essential; if your family does not support your efforts to lose weight, this can slow your progress or even keep you from losing weight. Positive thinking -- People often have conversations with themselves in their head; these conversations can be positive or negative. If you eat a piece of cake that was not planned, you may respond by thinking, \"Oh, you stupid idiot, you've blown your diet! \" and as a result, you may eat more cake. A positive thought for the same event could be, \"Well, I ate cake when it was not on my plan. Now I should do something to get back on track. \" A positive approach is much more likely to be successful than a negative one. Reduce stress -- Although stress is a part of everyday life, it can trigger uncontrolled eating in some people. It is important to find a way to get through these difficult times without eating or by eating low-calorie food, like raw vegetables. It may be helpful to imagine a relaxing place that allows you to temporarily escape from stress. With deep breaths and closed eyes, you can imagine this relaxing place for a few minutes. Self-help programs -- Self-help programs like [employer] Watchers®, Overeaters Anonymous®, and Take Off Yi (TOPS)© work for some people. As with all weight loss programs, you are most likely to be successful with these plans if you make long-term changes in how you eat. CHOOSING A DIET -- A calorie is a unit of energy found in food. Your body needs calories to function. The goal of any diet is to burn up more calories than you eat. How quickly you lose weight depends upon several factors, such as your age, gender, and starting weight. Older people have a slower metabolism than young people, so they lose weight more slowly. Men lose more weight than women of similar height and weight when dieting because they use more energy. People who are extremely overweight lose weight more quickly than those who are only mildly overweight.   Try not to drink alcohol or drinks with added sugar, and most sweets (candy, cakes, cookies), since they rarely contain important nutrients. Portion-controlled diets -- One simple way to diet is to buy packaged foods, like frozen low-calorie meals or meal-replacement canned drinks. A typical meal plan for one day may include:  A meal-replacement drink or breakfast bar for breakfast   A meal-replacement drink or a frozen low-calorie (250 to 350 calories) meal for lunch   A frozen low-calorie meal or other prepackaged, calorie-controlled meal, along with extra vegetables for dinner  This would give you 1000 to 1500 calories per day. Low-fat diet -- To reduce the amount of fat in your diet, you can:  Eat low-fat foods. Low-fat foods are those that contain less than 30 percent of calories from fat. Fat is listed on the food facts label (figure 1). Count fat grams. For a 1500 calorie diet, this would mean about 45 g or fewer of fat per day. Low-carbohydrate diet -- Low- and very-low-carbohydrate diets (eg, Atkins diet, [employer]) have become popular ways to lose weight quickly. With a very-low-carbohydrate diet, you eat between 0 and 60 grams of carbohydrates per day (a standard diet contains 200 to 300 grams of carbohydrates)   With a low-carbohydrate diet, you eat between 60 and 130 grams of carbohydrates per day  Carbohydrates are found in fruits, vegetables, and grains (including breads, rice, pasta, and cereal), alcoholic beverages, and in dairy products. Meat and fish do not contain carbohydrates. Side effects of very-low-carbohydrate diets can include constipation, headache, bad breath, muscle cramps, diarrhea, and weakness. Mediterranean diet -- The term \"Mediterranean diet\" refers to a way of eating that is common in [place] regions around the [facility]. Although there is some variation in Mediterranean diets, there are some similarities.  Most Mediterranean diets include:  A high level of monounsaturated fats (from olive or canola oil, walnuts, pecans, almonds) and a low level of saturated fats (from butter)   A high amount of vegetables, fruits, legumes, and grains (7 to 10 servings of fruits and vegetables per day)   A moderate amount of milk and dairy products, mostly in the form of cheese. Use low-fat dairy products (skim milk, fat-free yogurt, low-fat cheese). A relatively low amount of red meat and meat products. Substitute fish or poultry for red meat. For those who drink alcohol, a modest amount (mainly as red wine) may help to protect against cardiovascular disease. A modest amount is up to one (4 ounce) glass per day for women and up to two glasses per day for men. Which diet is best? -- Studies have compared different diets, including:  Very-low-carbohydrate (Atkins)   Macronutrient balance controlling glycemic load (Zone®)   Reduced-calorie (Weight Watchers®)   Very-low-fat (Ornish)  No one diet is \"best\" for weight loss. Any diet will help you to lose weight if you stick with the diet. Therefore, it is important to choose a diet that includes foods you like. Fad diets -- Fad diets often promise quick weight loss (more than 1 to 2 pounds per week) and may claim that you do not need to exercise or give up favorite foods. Some fad diets cost a lot of money, because you have to pay for seminars or pills. Fad diets generally lack any scientific evidence that they are safe and effective, but instead rely on \"before\" and \"after\" photos or testimonials. Diets that sound too good to be true usually are. These plans are a waste of time and money and are not recommended. A doctor, nurse, or nutritionist can help you find a safe and effective way to lose weight and keep it off. WEIGHT LOSS MEDICINES -- Taking a weight loss medicine may be helpful when used in combination with diet, exercise, and lifestyle changes. However, it is important to understand the risks and benefits of these medicines.  It is also important to be realistic about your goal weight using a weight loss medicine; you may not reach your \"dream\" weight, but you may be able to reduce your risk of diabetes or heart disease. Weight loss medicines may be recommended for people who have not been able to lose weight with diet and exercise who have a:  BMI of 30 or more    BMI between 27 and 29.9 and have other medical problems, such as diabetes, high cholesterol, or high blood pressure  Two weight loss medicines are approved in the [place] for long-term use. These are sibutramine and orlistat. Other weight loss medicines (phentermine, diethylpropion) are available but are only approved for short-term use (up to 12 weeks). Sibutramine -- Sibutramine (Meridia®, Reductil®) is a medicine that reduces your appetite. In people who take the medicine for one year, the average weight loss is 10 percent of the initial body weight (5 percent more than those who took a placebo treatment). Side effects of sibutramine include insomnia, dry mouth, and constipation. Increases in blood pressure can occur. Therefore, blood pressure is usually monitored during treatment. There is no evidence that sibutramine causes heart or lung problems (like dexfenfluramine and fenfluramine (Phen/Fen)). However, experts agree that sibutramine should not used by people with coronary heart disease, heart failure, uncontrolled hypertension, stroke, irregular heart rhythms, or peripheral vascular disease (poor circulation in the legs). Orlistat -- Orlistat (Xenical® 120 mg capsules) is a medicine that reduces the amount of fat your body absorbs from the foods you eat. A lower-dose version is now available without a prescription (Cecilio® 60 mg capsules) in many countries, including the [place]. The medicine is recommended three times per day, taken with a meal; you can skip a dose if you skip a meal or if the meal contains no fat.   After one year of treatment with orlistat, the average weight loss is approximately 8 to 10 percent of initial body weight (4 percent more than in those who took a placebo). Cholesterol levels often improve, and blood pressure sometimes falls. In people with diabetes, orlistat may help control blood sugar levels. Side effects occur in 15 to 10 percent of people and may include stomach cramps, gas, diarrhea, leakage of stool, or oily stools. These problems are more likely when you take orlistat with a high-fat meal (if more than 30 percent of calories in the meal are from fat). Side effects usually improve as you learn to avoid high-fat foods. Severe liver injury has been reported rarely in patients taking orlistat, but it is not known if orlistat caused the liver problems. Diet supplements -- Diet supplements are widely used by people who are trying to lose weight, although the safety and efficacy of these supplements are often unproven. A few of the more common diet supplements are discussed below; none of these are recommended because they have not been studied carefully, and there is no proof they are safe or effective. Chitosan and wheat dextrin are ineffective for weight loss, and their use is not recommended. Ephedra, a compound related to ephedrine, is no longer available in the [place] due to safety concerns. Many nonprescription diet pills previously contained ephedra. Although some studies have shown that ephedra helps with weight loss, there can be serious side effects (psychiatric symptoms, palpitations, and stomach upset), including death. There are not enough data about the safety and efficacy of chromium, ginseng, glucomannan, green tea, hydroxycitric acid, L carnitine, psyllium, pyruvate supplements, Chunky wort, and conjugated linoleic acid. Two supplements from [facility], [address] [place] (also known as the Surjit Sullivan 15 pill) and Herbathin dietary supplement, have been shown to contain prescription drugs.    Hoodia gordonii is a dietary supplement derived from a plant in [place]. It is not recommended because there is no proof that it is safe or effective. Bitter orange (Citrus aurantium) can increase your heart rate and blood pressure and is not recommended. SHOULD I HAVE SURGERY TO LOSE WEIGHT? -- Weight loss surgery is recommended ONLY for people with one of the following:  Severe obesity (body mass index above 40) (calculator 1 and calculator 2) who have not responded to diet, exercise, or weight loss medicines   Body mass index between 35 and 40, along with a serious medical problem (including diabetes, severe joint pain, or sleep apnea) that would improve with weight loss  You should be sure that you understand the potential risks and benefits of weight loss surgery. You must be motivated and willing to make lifelong changes in how you eat to reach and maintain a healthier weight after surgery. You must also be realistic about weight loss after surgery (see 'Effectiveness of weight loss surgery' below). PREPARING FOR WEIGHT LOSS SURGERY -- Most people who have weight loss surgery will meet with several specialists before surgery is scheduled. This often includes a dietitian, mental health counselor, a doctor who specializes in care of obese people, and a surgeon who performs weight loss surgery (bariatric surgeon). You may need to work with these providers for several weeks or months before surgery. The nutritionist will explain what and how much you will be able to eat after surgery. You may also need to lose a small amount of weight before surgery. The mental health specialist will help you to cope with stress and other factors that can make it harder to lose weight or trigger you to eat   The medical doctor will determine whether you need other tests, counseling, or treatment before surgery. He or she might also help you begin a medical weight loss program so that you can lose some weight before surgery.    The bariatric surgeon will meet with you to discuss the surgeries available to treat obesity. He or she will also make sure you are a good candidate for surgery. TYPES OF WEIGHT LOSS SURGERY -- There are several types of weight loss surgeries, the most common being lap banding, gastric bypass, and gastric sleeve. Lap banding -- Laparoscopic adjustable gastric banding (LAGB), or lap banding, is a surgery that uses an adjustable band around the opening to the stomach (figure 1). This reduces the amount of food that you can eat at one time. Lap banding is done through small incisions, with a laparoscope. The band can be adjusted after surgery, allowing you to eat more or less food. Adjustments to the size and tightness of the band are made by using a needle to add or remove fluid from a port (a small container under the skin that is connected to the band). Adding fluid to the band makes it tighter which restricts the amount of food you can eat and may help you to lose more weight. Lap banding is a popular choice because it is relatively simple to perform, can be adjusted or removed, and has a low risk of serious complications immediately after surgery. However, weight loss with the lap band depends on your ability to follow the program closely. You will need to prepare nutritious meals that [facility] with\" the band, not against it. For example, the lap band will not work well if you eat or drink a large amount of liquid calories (like ice cream). The band will not help you to feel full when you eat/drink liquid calories. Weight loss ranges from 45 to 75 percent after two years. As an example, a person who is 120 pounds overweight could expect to lose approximately 54 to 90 pounds in the two years after lap banding. Gastric bypass -- Rafael-en-Y gastric bypass, also called gastric bypass, helps you to lose weight by reducing the amount of food you can eat and reducing the number of calories and nutrients you absorb from the food you eat.   To perform gastric bypass, a surgeon creates a small stomach pouch by dividing the stomach and attaching it to the small intestine. This helps you to lose weight in two ways: The smaller stomach can hold less food than before surgery. This causes you to feel full after eating a very small amount of food or liquid. Over time, the pouch might stretch, allowing you to eat more food. The body absorbs fewer calories, since food bypasses most of the stomach as well as the upper small intestine. This new arrangement seems to decrease your appetite and change how you break down foods by changing the release of various hormones. Gastric bypass can be performed as open surgery (through an incision on the abdomen) or laparoscopically, which uses smaller incisions and smaller instruments. Both the laparoscopic and open techniques have risks and benefits. You and your surgeon should work together to decide which surgery, if any, is right for you. Gastric bypass has a high success rate, and people lose an average of 62 to 68 percent of their excess body weight in the first year. Weight loss typically levels off after one to two years, with an overall excess weight loss between 50 and 75 percent. For a person who is 120 pounds overweight, an average of 60 to 90 pounds of weight loss would be expected. Gastric sleeve -- Gastric sleeve, also known as sleeve gastrectomy, is a surgery that reduces the size of the stomach and makes it into a narrow tube (figure 3). The new stomach is much smaller and produces less of the hormone (ghrelin) that causes hunger, helping you feel satisfied with less food. Sleeve gastrectomy is safer than gastric bypass because the intestines are not rearranged, and there is less chance of malnutrition. It also appears to control hunger better than lap banding. It might be safer than the lap banding because no foreign materials are used.   The gastric sleeve has a good success rate, and people lose an average of 33 percent of their excess body weight in the first year. For a person who is 120 pounds overweight, this would mean losing about 40 pounds in the first year. WEIGHT LOSS SURGERY COMPLICATIONS -- A variety of complications can occur with weight loss surgery. The risks of surgery depend upon which surgery you have and any medical problems you had before surgery. Some of the more common early surgical complications (one to six weeks after surgery) include:  Bleeding   Infection   Blockage or tear in the bowels   Need for further surgery  Important medical complications after surgery can include blood clots in the legs or lungs, heart attack, pneumonia, and urinary tract infection. Complications are less likely when surgery is performed in centers that are experienced in weight loss surgery. In general, centers with experience in weight loss surgery have:  Board-certified doctors and surgeons   A team of support staff (dietitians, counselors, nurses)   Long-term follow-up after surgery   Hospital staff experienced with the care of weight loss patients. This includes nurses who are trained in the care of patients immediately after surgery and anesthesiologists who are experienced in caring for the morbidly obese. EFFECTIVENESS OF WEIGHT LOSS SURGERY -- The goal of weight loss surgery is to reduce the risk of illness or death associated with obesity. Weight loss surgery can also help you to feel and look better, reduce the amount of money you spend on medicines, and cut down on sick days. As an example, weight loss surgery can improve health problems related to obesity (diabetes, high blood pressure, high cholesterol, sleep apnea) to the point that you need less or no medicine. Finally, weight loss surgery might reduce your risk of developing heart disease, cancer, and certain infections.   AFTER WEIGHT LOSS SURGERY -- You will need to stay in the hospital until your team feels that it is safe for you to leave (on average, one to three days). Do not drive if you are taking prescription pain medicine. Begin exercising as soon as possible once you have healed; most weight loss centers will design an exercise program for you. Once you are home, it is important to eat and drink exactly what your doctor and dietitian recommend. You will see your doctor, nurse, and dietitian on a regular basis after surgery to monitor your health, diet, and weight loss. You will be able to slowly increase how much you eat over time, although it will always be important to:  Eat small, frequent meals and not skip meals   Chew your food slowly and completely   Avoid eating while \"distracted\" (such as eating while watching TV)   Stop eating when you feel full   Drink liquids at least 30 minutes before or after eating   Avoid foods high in fat or sugar   Take vitamin supplements, as recommended  It can take several months to learn to listen to your body so that you know when you are hungry and when you are full. You may dislike foods you previously loved, and you may begin to prefer new foods. This can be a frustrating process for some people, so talk to your dietitian if you are having trouble. It usually takes between one and two years to lose weight after surgery. After reaching their goal weight, some people have plastic surgery (called \"body contouring\") to remove excess skin from the body, particularly in the abdominal area. Before you decide to have weight loss surgery, you must commit to staying healthy for life. This includes following up with your healthcare team, exercising most days of the week, and eating a sensible diet every day. It can be difficult to develop new eating and exercise habits after weight loss surgery, and you will have to work hard to stick to your goals. Recovering from surgery and losing weight can be stressful and emotional, and it is important to have the support of family and friends.  Working with a social worker, therapist, or support group can help you through the ups and downs. WHERE TO GET MORE INFORMATION -- Your healthcare provider is the best source of information for questions and concerns related to your medical problem. This article will be updated as needed every four months on our Web site ([email or web address])    High-Fiber Diet     What Is Fiber? Dietary fiber is a form of carbohydrate found in plants that cannot be digested by humans. All plants contain fiber, including fruits, vegetables, grains, and legumes. Fiber is often classified into two categories: soluble and insoluble. Soluble fiber draws water into the bowel and can help slow digestion. Examples of foods that are high in soluble fiber include oatmeal, oat bran, barley, legumes (eg, beans and peas), apples, and strawberries. Insoluble fiber speeds digestion and can add bulk to the stool. Examples of foods that are high in insoluble fiber include whole-wheat products, wheat bran, cauliflower, green beans, and potatoes. Why Follow a High-Fiber Diet? A high-fiber diet is often recommended to prevent and treat constipation , hemorrhoids , diverticulitis , and irritable bowel syndrome . Eating a high-fiber diet can also help improve your cholesterol levels, lower your risk of coronary heart disease , reduce your risk of type 2 diabetes , and lower your weight. For people with type 1 or 2 diabetes, a high-fiber diet can also help stabilize blood sugar levels. How Much Fiber Should I Eat? A high-fiber diet should contain  20-35 grams  of fiber a day. This is actually the amount recommended for the general adult population; however, most Americans eat only 15 grams of fiber per day. Digestion of Fiber   Eating a higher fiber diet than usual can take some getting used to by your body's digestive system.  To avoid the side effects of sudden increases in dietary fiber (eg, gas, cramping, bloating, and diarrhea), increase fiber gradually and be sure to drink plenty of fluids every day. Tips for Increasing Fiber Intake   Whenever possible, choose whole grains over refined grains (eg, brown rice instead of white rice, whole-wheat bread instead of white bread). Include a variety of grains in your diet, such as wheat, rye, barley, oats, quinoa, and bulgur. Eat more vegetarian-based meals. Here are some ideas: black bean burgers, eggplant lasagna, and veggie tofu stir-perera. Choose high-fiber snacks, such as fruits, popcorn, whole-grain crackers, and nuts. Make whole-grain cereal or whole-grain toast part of your daily breakfast regime. When eating out, whether ordering a sandwich or dinner, ask for extra vegetables. When baking, replace part of the white flour with whole-wheat flour. Whole-wheat flour is particularly easy to incorporate into a recipe. High-Fiber Diet Eating Guide   Food Category   Foods Recommended   Notes   Grains   Whole-grain breads, muffins, bagels, or leyda bread Rye bread Whole-wheat crackers or crisp breads Whole-grain or bran cereals Oatmeal, oat bran, or grits Wheat germ Whole-wheat pasta and brown rice   Read the ingredients list on food labels. Look for products that list \"whole\" as the first ingredient (eg, whole-wheat, whole oats). Choose cereals with at least 2 grams of fiber per serving.    Vegetables   All vegetables, especially asparagus, bean sprouts, broccoli, [place] sprouts, cabbage, carrots, cauliflower, celery, corn, greens, green beans, green pepper, onions, peas, potatoes (with skin), snow peas, spinach, squash, sweet potatoes, tomatoes, zucchini   For maximum fiber intake, eat the peels of fruits and vegetablesjust be sure to wash them well first.   Fruits   All fruits, especially apples, berries, grapefruits, mangoes, nectarines, oranges, peaches, pears, dried fruits (figs, dates, prunes, raisins)   Choose raw fruits and vegetables over juice, cooked, or cannedraw fruit has more fiber. Dried fruit is also a good source of fiber. Milk   With the exception of yogurt containing inulin (a type of fiber), dairy foods provide little fiber. Add more fiber by topping your yogurt or cottage cheese with fresh fruit, whole grain or bran cereals, nuts, or seeds. Meats and Beans   All beans and peas, especially Garbanzo beans, kidney beans, lentils, lima beans, split peas, and watson beans All nuts and seeds, especially almonds, peanuts, [place] nuts, cashews, peanut butter, walnuts, sesame and sunflower seeds All meat, poultry, fish, and eggs   Increase fiber in meat dishes by adding watson beans, kidney beans, black-eyed peas, bran, or oatmeal. If you are following a low-fat diet, use nuts and seeds only in moderation. Fats and Oils   All in moderation   Fats and oils do not provide fiber   Snacks, Sweets, and Condiments   Fruit Nuts Popcorn, whole-wheat pretzels, or trail mix made with dried fruits, nuts, and seeds Cakes, breads, and cookies made with oatmeal or whole-wheat flour   Most snack foods do not provide much fiber. Choose snacks with at least 2 grams of fiber per serving. Last Reviewed: March 2011 Radha Covington MS, MPH, RD   Updated: 3/29/2011       Safer Sex: After Your Visit  Your Care Instructions  Safer sex is a way to reduce your risk of getting an infection spread through sex. It can also help prevent pregnancy. Most infections that are spread through sex, also called sexually transmitted infections or STIs, can be cured. But some can decrease your chances of getting pregnant if they are not treated early. Others, such as herpes, have no cure. And some, such as HIV, can be deadly. Several products can help you practice safer sex and reduce your chance of STIs. One of the best is a condom. There are condoms for men and for women. The female condom is a tube of soft plastic with a closed end that is placed deep into the vagina.  You can use a special rubber sheet (dental dam) for protection during oral sex. Latex gloves can keep your hands from touching blood, semen, or other body fluids that can carry infections. Remember that birth control methods such as diaphragms, IUDs, foams, and birth control pills do not stop you from getting STIs. Follow-up care is a key part of your treatment and safety. Be sure to make and go to all appointments, and call your doctor if you are having problems. Its also a good idea to know your test results and keep a list of the medicines you take. How can you care for yourself at home? Think about getting shots to prevent hepatitis A and hepatitis B. These two diseases can be spread through sex. You also can get hepatitis A if you eat infected food. Use condoms or female condoms each time and every time you have sex. Learn the right way to use a male condom:  Condoms come in several sizes. Make sure you use the right size. A condom that is too small can break easily. A condom that is too big can slip off during sex. Use a new condom each time you have sex. Be careful not to poke a hole in the condom when you open the wrapper. Squeeze the tip of the condom to keep out air. Pull down the loose skin (foreskin) from the head of an uncircumcised penis. While squeezing the tip of the condom, unroll it all the way down to the base of the firm penis. Never use petroleum jelly (such as Vaseline), grease, hand lotion, baby oil, or anything with oil in it. These products can make holes in the condom. After sex, hold the condom on your penis as you remove your penis from your partner. This will keep semen from spilling out of the condom. Learn to use a female condom:  You can put in a female condom up to 8 hours before sex. Squeeze the smaller ring at the closed end and insert it deep into the vagina. The larger ring at the open end should stay outside the vagina. During sex, make sure the penis goes into the condom.   After the penis is removed, close the open end of the condom by twisting it. Remove the condom. Do not use a female condom and male condom at the same time. Do not have sex with anyone who has symptoms of an STI, such as sores on the genitals or mouth. The herpes virus that causes cold sores can spread to and from the penis and vagina. Do not drink a lot of alcohol or use drugs before sex. This can cause you to let down your guard and not practice safer sex. Having one sex partner (who does not have STIs and does not have sex with anyone else) is a sure way to avoid STIs. Talk to your partner before you have sex. Find out if he or she has or is at risk for any STI. Keep in mind that a person may be able to spread an STI even if he or she does not have symptoms. You and your partner may want to get an HIV test. You should get tested again 6 months later. © 2033-2212 [employer]. Care instructions adapted under license by [facility]. This care instruction is for use with your licensed healthcare professional. If you have questions about a medical condition or this instruction, always ask your healthcare professional. [address] any warranty or liability for your use of this information. Content Version: 9.4.65729; Last Revised: January 19, 2012                Keep Your Memory Kiera Ferrer       Many factors can affect your ability to remembera hectic lifestyle, aging, stress, chronic disease, and certain medicines. But, there are steps you can take to sharpen your mind and help preserve your memory. Challenge Your Brain   Regularly challenging your mind may help keeps it in top shape. Good mental exercises include:   Crossword puzzlesUse a dictionary if you need it; you will learn more that way. Brainteasers Try some! Crafts, such as wood working and sewing   Hobbies, such as gardening and building model airplanes   SocializingVisit old friends or join groups to meet new ones.    Reading Learning a new language   Taking a class, whether it be art history or raudel chi   TravelingExperience the food, history, and culture of your destination   Learning to use a computer   Going to museums, the theater, or thought-provoking movies   Changing things in your daily life, such as reversing your pattern in the grocery store or brushing your teeth using your nondominant hand   Use Memory Aids   There is no need to remember every detail on your own. These memory aids can help:   Calendars and day planners   Electronic organizers to store all sorts of helpful informationThese devices can \"beep\" to remind you of appointments. A book of days to record birthdays, anniversaries, and other occasions that occur on the same date every year   Detailed \"to-do\" lists and strategically placed sticky notes   Quick \"study\" sessionsBefore a gathering, review who will be there so their names will be fresh in your mind. Establish routinesFor example, keep your keys, wallet, and umbrella in the same place all the time or take medicine with your 8:00 AM glass of juice   Live a Healthy Life   Many actions that will keep your body strong will do the same for your mind. For example:   Talk to Your Doctor About Herbs and Supplements    Malnutrition and vitamin deficiencies can impair your mental function. For example, vitamin B12 deficiency can cause a range of symptoms, including confusion. But, what if your nutritional needs are being met? Can herbs and supplements still offer a benefit? Researchers have investigated a range of natural remedies, such as ginkgo , ginseng , and the supplement phosphatidylserine (PS). So far, though, the evidence is inconsistent as to whether these products can improve memory or thinking. If you are interested in taking herbs and supplements, talk to your doctor first because they may interact with other medicines that you are taking.    Exercise Regularly    Among the many benefits of regular exercise are increased blood flow to the brain and decreased risk of certain diseases that can interfere with memory function. One study found that even moderate exercise has a beneficial effect. Examples of \"moderate\" exercise include:   Playing 18 holes of golf once a week, without a cart   Playing tennis twice a week   Walking one mile per day   Manage Stress    It can be tough to remember what is important when your mind is cluttered. Make time for relaxation. Choose activities that calm you down, and make it routine. Manage Chronic Conditions    Side effects of high blood pressure , diabetes, and heart disease can interfere with mental function. Many of the lifestyle steps discussed here can help manage these conditions. Strive to eat a healthy diet, exercise regularly, get stress under control, and follow your doctor's advice for your condition. Minimize Medications    Talk to your doctor about the medicines that you take. Some may be unnecessary. Also, healthy lifestyle habits may lower the need for certain drugs. Last Reviewed: April 2010 Montana Garcia MD   Updated: 4/13/2010     Keeping Home a [facility]       As we get older, changes in balance, gait, strength, vision, hearing, and cognition make even the most youthful senior more prone to accidents. Falls are one of the leading health risks for older people. This increased risk of falling is related to:   Aging process (eg, decreased muscle strength, slowed reflexes)   Higher incidence of chronic health problems (eg, arthritis, diabetes) that may limit mobility, agility or sensory awareness   Side effects of medicine (eg, dizziness, blurred vision)especially medicines like prescription pain medicines and drugs used to treat mental health conditions   Depending on the brittleness of your bones, the consequences of a fall can be serious and long lasting.    Home Life   Research by the Association of Aging [facility]) shows that some home accidents among older adults can be prevented by making simple lifestyle changes and basic modifications and repairs to the home environment. Here are some lifestyle changes that experts recommend:   Have your hearing and vision checked regularly. Be sure to wear prescription glasses that are right for you. Speak to your doctor or pharmacist about the possible side effects of your medicines. A number of medicines can cause dizziness. If you have problems with sleep, talk to your doctor. Limit your intake of alcohol. If necessary, use a cane or walker to help maintain your balance. Wear supportive, rubber-soled shoes, even at home. If you live in a region that gets wintry weather, you may want to put special cleats on your shoes to prevent you from slipping on the snow and ice. Exercise regularly to help maintain muscle tone, agility, and balance. Always hold the banister when going up or down stairs. Also, use  bars when getting in or out of the bath or shower, or using the toilet. To avoid dizziness, get up slowly from a lying down position. Sit up first, dangling your legs for a minute or two before rising to a standing position. Overall Home Safety Check   According to the Consumer Product Safety Commision's \"Older Consumer Home Safety Checklist,\" it is important to check for potential hazards in each room. And remember, proper lighting is an essential factor in home safety. If you cannot see clearly, you are more likely to fall. Important questions to ask yourself include:   Are lamp, electric, extension, and telephone cords placed out of the flow of traffic and maintained in good condition? Have frayed cords been replaced? Are all small rugs and runners slip resistant? If not, you can secure them to the floor with a special double-sided carpet tape. Are smoke detectors properly locatedone on every floor of your home and one outside of every sleeping area? Are they in good working order?  Are batteries replaced at least once a year? Do you have a well-maintained carbon monoxide detector outside every sleeping are in your home? Does your furniture layout leave plenty of space to maneuver between and around chairs, tables, beds, and sofas? Are hallways, stairs and passages between rooms well lit? Can you reach a lamp without getting out of bed? Are floor surfaces well maintained? Shag rugs, high-pile carpeting, tile floors, and polished wood floors can be particularly slippery. Stairs should always have handrails and be carpeted or fitted with a non-skid tread. Is your telephone easily reachable. Is the cord safely tucked away? Room by Room   According to the Association of Aging, bathrooms and judah are the two most potentially hazardous rooms in your home. In the Kitchen    Be sure your stove is in proper working order and always make sure burners and the oven are off before you go out or go to sleep. Keep pots on the back burners, turn handles away from the front of the stove, and keep stove clean and free of grease build-up. Kitchen ventilation systems and range exhausts should be working properly. Keep flammable objects such as towels and pot holders away from the cooking area except when in use. Make sure kitchen curtains are tied back. Move cords and appliances away from the sink and hot surfaces. If extension cords are needed, install wiring guides so they do not hang over the sink, range, or working areas. Look for coffee pots, kettles and toaster ovens with automatic shut-offs. Keep a mop handy in the kitchen so you can wipe up spills instantly. You should also have a small fire extinguisher. Arrange your kitchen with frequently used items on lower shelves to avoid the need to stand on a stepstool to reach them. Make sure countertops are well-lit to avoid injuries while cutting and preparing food.     In the Bathroom    Use a non-slip mat or decals in the tub and shower, since wet, soapy tile or porcelain surfaces are extremely slippery. Make sure bathroom rugs are non-skid or tape them firmly to the floor. Bathtubs should have at least one, preferably two, grab bars, firmly attached to structural supports in the wall. (Do not use built-in soap holders or glass shower doors as grab bars.)    Tub seats fitted with non-slip material on the legs allow you to wash sitting down. For people with limited mobility, bathtub transfer benches allow you to slide safely into the tub. Raised toilet seats and toilet safety rails are helpful for those with knee or hip problems. In the [facility]    Make sure you use a nightlight and that the area around your bed is clear of potential obstacles. Be careful with electric blankets and never go to sleep with a heating pad, which can cause serious burns even if on a low setting. Use fire-resistant mattress covers and pillows, and NEVER smoke in bed. Keep a phone next to the bed that is programmed to dial 911 at the push of a button. If you have a chronic condition, you may want to sign on with an automatic call-in service. Typically the system includes a small pendant that connects directly to an emergency medical voice-response system. You should also make arrangements to stay in contact with someonefriend, neighbor, family memberon a regular schedule. Fire Prevention   According to the [employer] (Smoke Alarms for Every) [address] [place] [address], senior citizens are one of the two highest risk groups for death and serious injuries due to residential fires. When cooking, wear short-sleeved items, never a bulky long-sleeved robe. The [facility]'s Safety Checklist for Older Consumers emphasizes the importance of checking basements, garages, workshops and storage areas for fire hazards, such as volatile liquids, piles of old rags or clothing and overloaded circuits.     Never smoke in bed or when lying down on a couch or recliner chair. Small portable electric or kerosene heaters are responsible for many home fires and should be used cautiously if at all. If you do use one, be sure to keep them away from flammable materials. In case of fire, make sure you have a pre-established emergency exit plan. Have a professional check your fireplace and other fuel-burning appliances yearly. [employer]   Baby boomers entering the greenfield years will continue to see the development of new products to help older adults live safely and independently in spite of age-related changes. Making Life More Livable  , by Carmencita Watson, lists over 1,000 products for \"living well in the mature years,\" such as bathing and mobility aids, household security devices, ergonomically designed knives and peelers, and faucet valves and knobs for temperature control. Medical supply stores and organizations are good sources of information about products that improve your quality of life and insure your safety. Last Reviewed: November 2009 Raul Singh MD   Updated: 3/7/2011            Advance Care Planning: Care Instructions  Your Care Instructions     It can be hard to live with an illness that cannot be cured. But if your health is getting worse, you may want to make decisions about end-of-life care. Planning for the end of your life does not mean that you are giving up. It is a way to make sure that your wishes are met. Clearly stating your wishes can make it easier for your loved ones. Making plans while you are still able may also ease your mind and make your final days less stressful and more meaningful. Follow-up care is a key part of your treatment and safety. Be sure to make and go to all appointments, and call your doctor if you are having problems. It's also a good idea to know your test results and keep a list of the medicines you take. What can you do to plan for the end of life? You can bring these issues up with your doctor.  You do not need to wait until your doctor starts the conversation. You might start with, \"What makes life worth living for me is. Segura Colon .\" And then follow it with, \"I would not be willing to live with . Segura Colon Segura Colon \" When you complete this sentence it helps your doctor understand your wishes. Talk openly and honestly with your doctor. This is the best way to understand the decisions you will need to make as your health changes. Know that you can always change your mind. Ask your doctor about commonly used life-support measures. These include tube feedings, breathing machines, and fluids given through a vein (IV). Understanding these treatments will help you decide whether you want them. You may choose to have these life-supporting treatments for a limited time. This allows a trial period to see whether they will help you. You may also decide that you want your doctor to take only certain measures to keep you alive. It may help to think about the big picture, like what makes life worth living for you or what your values and goals are. Talk to your doctor about how long you are likely to live. Your doctor may be able to give you an idea of what usually happens with your specific illness. Think about preparing papers that state your wishes. These papers are called advance directives. If you do this early and review them often, there will not be any confusion about what you want. You can change your instructions at any time. Which papers should you prepare? Advance directives are legal papers that tell doctors how you want to be cared for at the end of your life. You do not need a  to write these papers. Ask your doctor or your state health department for information on how to write your advance directives. They may have the forms for each of these types of papers. Make sure your doctor has a copy of these on file, and give a copy to a family member or close friend. Consider a do-not-resuscitate order (DNR).  This order asks that no extra treatments be done if your heart stops or you stop breathing. Extra treatments may include cardiopulmonary resuscitation (CPR), electrical shock to restart your heart, or a machine to breathe for you. If you decide to have a DNR order, ask your doctor to explain and write it. Place the order in your home where everyone can easily see it. Consider a living will. A living will explains your wishes about life support and other treatments at the end of your life if you become unable to speak for yourself. Living valentin tell doctors to use or not use treatments that would keep you alive. You must have one or two witnesses or a notary present when you sign this form. A living will may be called something else in your state. Consider a medical power of . This form allows you to name a person to make decisions about your care if you are not able to. Most people ask a close friend or family member. Talk to this person about the kinds of treatments you want and those that you do not want. Make sure this person understands your wishes. A medical power of  may be called something else in your state. These legal papers are simple to change. Tell your doctor what you want to change, and ask him or her to make a note in your medical file. Give your family updated copies of the papers. Where can you learn more? Go to http://www.moss.com/ and enter P184 to learn more about \"Advance Care Planning: Care Instructions. \"  Current as of: October 6, 2021               Content Version: 13.5  © 2006-2022 Healthwise, Incorporated. Care instructions adapted under license by [facility] [place] ([facility]). If you have questions about a medical condition or this instruction, always ask your healthcare professional. [place] [address] any warranty or liability for your use of this information. Learning About Living Bernadette Lange  What is a living will?      A living will, also called a declaration, is a legal form. It tells your family and your doctor your wishes when you can't speak for yourself. It's used by the health professionals who will treat you as you near the end of your life or if you get seriously hurt or ill. If you put your wishes in writing, your loved ones and others will know what kind of care you want. They won't need to guess. This can ease your mind and be helpful to others. And you can change or cancel your living will at any time. A living will is not the same as an estate or property will. An estate will explains what you want to happen with your money and property after you die. How do you use it? Keep these facts in mind about how a living will is used. Your living will is used only if you can't speak or make decisions for yourself. Most often, one or more doctors must certify that you can't speak or decide for yourself before your living will takes effect. If you get better and can speak for yourself again, you can accept or refuse any treatment. It doesn't matter what you said in your living will. Some states may limit your right to refuse treatment in certain cases. For example, you may need to clearly state in your living will that you don't want artificial hydration and nutrition, such as being fed through a tube. Is a living will a legal document? A living will is a legal document. Each state has its own laws about living valentin. And a living will may be called something else in your state. Here are some things to know about living valentin. You don't need an  to complete a living will. But legal advice can be helpful if your state's laws are unclear. It can also help if your health history is complicated or your family can't agree on what should be in your living will. You can change your living will at any time. Some people find that their wishes about end-of-life care change as their health changes.  If you make big changes to your living will, complete a new form. If you move to another state, make sure that your living will is legal in the state where you now live. In most cases, doctors will respect your wishes even if you have a form from a different state. You might use a universal form that has been approved by many states. This kind of form can sometimes be filled out and stored online. Your digital copy will then be available wherever you have a connection to the internet. The doctors and nurses who need to treat you can find it right away. Your state may offer an online registry. This is another place where you can store your living will online. It's a good idea to get your living will notarized. This means using a person called a [employer] to watch two people sign, or witness, your living will. What should you know when you create a living will? Here are some questions to ask yourself as you make your living will. Do you know enough about life support methods that might be used? If not, talk to your doctor so you know what might be done if you can't breathe on your own, your heart stops, or you can't swallow. What things would you still want to be able to do after you receive life-support methods? Would you want to be able to walk? To speak? To eat on your own? To live without the help of machines? Do you want certain Voodoo practices performed if you become very ill? If you have a choice, where do you want to be cared for? In your home? At a hospital or nursing home? If you have a choice at the end of your life, where would you prefer to die? At home? In a hospital or nursing home? Somewhere else? Would you prefer to be buried or cremated? Do you want your organs to be donated after you die? What should you do with your living will? Make sure that your family members and your health care agent have copies of your living will (also called a declaration). Give your doctor a copy of your living will.  Ask to have it kept as part of your medical record. If you have more than one doctor, make sure that each one has a copy. Put a copy of your living will where it can be easily found. For example, some people may put a copy on their refrigerator door. If you are using a digital copy, be sure your doctor, family members, and health care agent know how to find and access it. Where can you learn more? Go to http://www.moss.com/ and enter K356 to learn more about \"Learning About Living Perrojeanine. \"  Current as of: June 16, 2022               Content Version: 13.5  © 5003-2147 [employer]. Care instructions adapted under license by [facility] ([facility]). If you have questions about a medical condition or this instruction, always ask your healthcare professional. [address] any warranty or liability for your use of this information. Learning About Medical Power of   What is a medical power of ? A medical power of , also called a durable power of  for health care, is one type of the legal forms called advance directives. It lets you name the person you want to make treatment decisions for you if you can't speak or decide for yourself. The person you choose is called your health care agent. This person is also called a health care proxy or health care surrogate. A medical power of  may be called something else in your state. How do you choose a health care agent? Choose your health care agent carefully. This person may or may not be a family member. Talk to the person before you make your final decision. Make sure he or she is comfortable with this responsibility. It's a good idea to choose someone who:  Is at least 25years old. Knows you well and understands what makes life meaningful for you. Understands your Sabianist and moral values. Will do what you want, not what he or she wants.   Will be able to make difficult choices at a stressful time.  Will be able to refuse or stop treatment, if that is what you would want, even if you could die.  Will be firm and confident with health professionals if needed.  Will ask questions to get needed information.  Lives near you or agrees to travel to you if needed.  Your family may help you make medical decisions while you can still be part of that process. But it's important to choose one person to be your health care agent in case you aren't able to make decisions for yourself.  If you don't fill out the legal form and name a health care agent, the decisions your family can make may be limited.  A health care agent may be called something else in your state.  Who will make decisions for you if you don't have a health care agent?  If you don't have a health care agent or a living will, you may not get the care you want. Decisions may be made by family members who disagree about your medical care. Or decisions may be made by a medical professional who doesn't know you well. In some cases, a  makes the decisions.  When you name a health care agent, it is very clear who has the power to make health decisions for you.  How do you name a health care agent?  You name your health care agent on a legal form. This form is usually called a medical power of . Ask your hospital, state bar association, or office on aging where to find these forms.  You must sign the form to make it legal. Some states require you to get the form notarized. This means that a person called a  watches you sign the form and then he or she signs the form. Some states also require that two or more witnesses sign the form.  Be sure to tell your family members and doctors who your health care agent is.  Where can you learn more?  Go to https://www.healthwise.net/patientEd and enter P737 to learn more about \"Learning About Medical Power of .\"  Current as of: October 6, 2021               Content Version: 13.5  ©  5413-5431 [employer].   Care instructions adapted under license by [facility]. If you have questions about a medical condition or this instruction, always ask your healthcare professional. Healthwise, [employer] disclaims any warranty or liability for your use of this information.

## 2023-02-22 NOTE — ASSESSMENT & PLAN NOTE
Blood pressure stable  On increase  losartan 100 mg daily  To verify blood pressure cuff accuracy  To keep outpatient BP log, follow-up in 2 weeks for nurse blood pressure visit  counseled on exercise and diet (including DASH diet)  Goal to achieve appropriate BMI  Patient agreed with plan with verbal understanding

## 2023-02-22 NOTE — ASSESSMENT & PLAN NOTE
Stable and controlled  Continue medication as documented in your medication list   On duloxetine 60 mg daily

## 2023-02-22 NOTE — PROGRESS NOTES
Well Adult Note  Name: Preet Moran Date: 2023   MRN: [id number] Sex: Female   Age: 49 y.o. Ethnicity: Non- / Non    : 1975 Race: White (non-)      Justin Hernandez is here for well adult exam.  History:  Wellness exam - Patient presents today for annual physical. Patient  reports feeling well. Patient has a normal appetite. Patient  eats 5+ servings of vegetables/fruits each day. Patient prepares food at home multiple times/day, eats in restaurants rarely. Patient  states that she sleeps well and gets 7-8 hours of sleep on average. In regards to emotional health, patient  denies depression or anxiety. Libido is considered to be normal. In regards to bowel habits, patient  has no complaints. Regarding urination, no complaints. Patient reports they feels safe at home, uses seat belts and has smoke detectors. Patient has a good work-life balance, and is happy with her job. Health Maintenance    Last Papsmear-s/p  Total hysterectomy  (21-64 y/o)  Annual retinal eye exam - Patient due  will need to schedule   Annual Dentist visit - 3/2022  Tobacco smoking - NO  Alcohol Misuse - NO  Illicit Drug Use- NO  Healthy Diet and physical activity -  YES  Obesity Screen- screened 8/15/2022  Wears seat belt-  YES  End of life directives discussed with patient. -Mentions she does not have  a will/or/an advanced directives. Was instructed to start process looking into preparing her will an advanced directives. Review of Systems   Constitutional:  Negative for activity change, appetite change, chills, fatigue, fever and unexpected weight change. HENT:  Negative for congestion, ear pain, postnasal drip, sinus pressure, sore throat, tinnitus and trouble swallowing. Eyes:  Negative for pain and redness. Respiratory:  Negative for cough, chest tightness, shortness of breath and wheezing. Cardiovascular:  Negative for chest pain, palpitations and leg swelling.    Gastrointestinal: Negative for abdominal distention, abdominal pain, blood in stool, constipation, diarrhea, nausea and vomiting. Endocrine: Negative for cold intolerance, heat intolerance and polydipsia. Genitourinary:  Negative for decreased urine volume, dysuria, flank pain, frequency, hematuria and urgency. Musculoskeletal:  Negative for arthralgias, back pain, joint swelling, neck pain and neck stiffness. Skin:  Negative for color change and rash. Neurological:  Positive for numbness. Negative for dizziness, weakness and headaches. Hematological:  Negative for adenopathy. Psychiatric/Behavioral:  Negative for behavioral problems, sleep disturbance and suicidal ideas. The patient is not nervous/anxious. No Known Allergies      Prior to Visit Medications    Medication Sig Taking?  Authorizing Provider   losartan (COZAAR) 100 MG tablet Take 1 tablet by mouth daily Yes Gigi Laureano MD   DULoxetine (CYMBALTA) 60 MG extended release capsule TAKE 1 CAPSULE BY MOUTH EVERY DAY Yes Gigi Laureano MD   tiZANidine (ZANAFLEX) 4 MG tablet Take 1 tablet by mouth 3 times daily as needed (muscle pain) Yes Gigi Laureano MD   pravastatin (PRAVACHOL) 10 MG tablet TAKE 1 TABLET BY MOUTH EVERY DAY Yes Gigi Laureano MD   nicotine (NICOTROL) 10 MG inhaler nicotrol 6 to 16 cartridges/day (at least 6 cartridges/day for the first 3 to 6 weeks) for up to 12 weeks; maximum: Note: Best effect achieved with frequent continuous puffing (20 minutes) 1 Yes Gigi Laureano MD   estradiol (ESTRACE) 1 MG tablet TAKE 1 TABLET BY MOUTH EVERY DAY Yes GONZALO Motta   acetaminophen (TYLENOL) 500 MG tablet Take 500 mg by mouth every 6 hours as needed for Pain Yes Historical Provider, MD   Calcium Carbonate Antacid (CALCIUM CARBONATE PO) Calcium Carbonate Oral        Active Yes Historical Provider, MD         Past Medical History:   Diagnosis Date    Hemochromatosis     IBS (irritable bowel syndrome)     Primary insomnia        Past Surgical History:   Procedure Laterality Date    CHOLECYSTECTOMY, LAPAROSCOPIC N/A 2022    LAPAROSCOPIC CHOLECYSTECTOMY WITH CHOLANGIOGRAM WITH LIVER BIOPSY performed by Yovanny Persaud MD at [address], VAGINAL  2010    OTHER SURGICAL HISTORY      uterine mobilization: fibroids    PARTIAL NEPHRECTOMY Right 2021    ROBOTIC LAPAROSCOPIC PARTIAL RIGHT NEPHRECTOMY performed by Nico Holm MD at [address]         Family History   Problem Relation Age of Onset    Diabetes Mother     High Blood Pressure Mother     Other Father         Pulmonary embolism, DVT's    Cancer Maternal Aunt         brain    Cancer Maternal Grandmother         brain    No Known Problems Maternal Grandfather     No Known Problems Paternal Grandmother     No Known Problems Paternal Grandfather     No Known Problems Sister        Social History     Tobacco Use    Smoking status: Former     Packs/day: 0.50     Years: 20.00     Pack years: 10.00     Types: Cigarettes     Quit date: 2014     Years since quittin.4    Smokeless tobacco: Never   Vaping Use    Vaping Use: Former   Substance Use Topics    Alcohol use: Yes     Alcohol/week: 0.0 standard drinks     Comment: social    Drug use: No       Objective   /78 (Site: Left Upper Arm, Position: Sitting, Cuff Size: Medium Adult)   Pulse 90   Resp 14   Ht 5' 8\" (1.727 m)   Wt 144 lb 6.4 oz (65.5 kg)   SpO2 98%   BMI 21.96 kg/m²   Wt Readings from Last 3 Encounters:   23 144 lb 6.4 oz (65.5 kg)   10/27/22 155 lb 3.2 oz (70.4 kg)   22 153 lb 3.2 oz (69.5 kg)     There were no vitals filed for this visit. Physical Exam  Constitutional:       General: She is not in acute distress. Appearance: Normal appearance. She is not ill-appearing. HENT:      Head: Normocephalic and atraumatic. Right Ear: Tympanic membrane, ear canal and external ear normal. There is no impacted cerumen.       Left Ear: Tympanic membrane, ear canal and external ear normal. There is no impacted cerumen. Mouth/Throat:      Mouth: Mucous membranes are moist.      Pharynx: No oropharyngeal exudate or posterior oropharyngeal erythema. Eyes:      Extraocular Movements: Extraocular movements intact. Conjunctiva/sclera: Conjunctivae normal.      Pupils: Pupils are equal, round, and reactive to light. Neck:      Vascular: No carotid bruit. Cardiovascular:      Rate and Rhythm: Normal rate and regular rhythm. Pulses: Normal pulses. Heart sounds: Normal heart sounds. No murmur heard. No gallop. Pulmonary:      Effort: Pulmonary effort is normal.      Breath sounds: Normal breath sounds. No wheezing, rhonchi or rales. Abdominal:      General: Abdomen is flat. Bowel sounds are normal. There is no distension. Palpations: Abdomen is soft. Tenderness: There is no abdominal tenderness. There is no guarding or rebound. Musculoskeletal:         General: No swelling or tenderness. Normal range of motion. Cervical back: No tenderness. Lymphadenopathy:      Cervical: No cervical adenopathy. Skin:     Findings: No erythema, lesion or rash. Neurological:      General: No focal deficit present. Mental Status: She is alert and oriented to person, place, and time. Mental status is at baseline. Cranial Nerves: No cranial nerve deficit. Motor: No weakness. Psychiatric:         Mood and Affect: Mood normal.         Behavior: Behavior normal.         Thought Content: Thought content normal.         Judgment: Judgment normal.         Assessment   Plan   1. Encounter for well adult exam without abnormal findings  Assessment & Plan:   Patient comes in for wellness exam   we discussed age appropriate USPSTF screens  Over 75% of the visit was spent counselling patient on appropriate lifestyle choices. Orders:  -     Vitamin B12 & Folate; Future  -     Magnesium;  Future  -     CBC with Auto Differential; Future  - Comprehensive Metabolic Panel; Future  -     Hemoglobin A1C; Future  -     Lipid Panel; Future  -     TSH with Reflex; Future  -     Ferritin; Future  -     Iron and TIBC; Future  2. Benign essential HTN  Assessment & Plan:  Blood pressure stable  On increase  losartan 100 mg daily  To verify blood pressure cuff accuracy  To keep outpatient BP log, follow-up in 2 weeks for nurse blood pressure visit  counseled on exercise and diet (including DASH diet)  Goal to achieve appropriate BMI  Patient agreed with plan with verbal understanding     Orders:  -     CBC with Auto Differential; Future  -     Comprehensive Metabolic Panel; Future  3. Hereditary hemochromatosis ([address]  Assessment & Plan:   Patient follows with hematology. 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 Patient used to receive therapeutic phlebotomy, patient says she is at maintenance phase with  Ferritin within normal limits  Has follow-up with hematologist in 12/2022  Follow-up with specialists as per their orders. Orders:  -     CBC with Auto Differential; Future  -     Ferritin; Future  -     Iron and TIBC; Future  4. Mixed hyperlipidemia  Assessment & Plan:  Elevate LDL & HDL  Patient was started on atorvastatin by prior PCP, patient said on taking atorvastatin made  her cervical axillary lymph nodes  feel tender without enlargement. On low dose pravastatin and tolerating it  Will obtain blood work next week  Orders:  -     Comprehensive Metabolic Panel; Future  -     Lipid Panel; Future  5. Fibromyalgia  Assessment & Plan:   diagnosis of fibromyalgia  On appropriate medication  Counseled on lifestyle changes and aerobic exercise contributing to 70% of the treatment  Informed that study show raudel chi may be better than aerobic exercise  Patient said duloxetine 60 mg daily has helped considerably  Discussed adding low-dose TCA at bedtime patient declined     6.  Generalized anxiety disorder  Assessment & Plan:  Stable and controlled  Continue medication as documented in your medication list   On duloxetine 60 mg daily  7. Vaping nicotine dependence, non-tobacco product  Assessment & Plan:   Patient counseled on the benefits of Vaping  cessation including but not limited to reducing the incidence of lung cancer, COPD, jeffy-pharyngeal cancer, laryngeal cancer, Nicotine addiction, Severe lung injury, Seizures, Cryptogenic organizing pneumonia (), Popcorn lung, Pancreatic cancer, kidney/bladder cancer, osteoporosis, Heart disease, CVA, PAD. 8. Low vitamin B12 level  -     Vitamin B12 & Folate; Future  9. Low magnesium level  -     Magnesium; Future  10. Impaired glucose regulation  -     Hemoglobin A1C; Future  11. Other fatigue  -     TSH with Reflex; Future  12. Encounter for screening mammogram for malignant neoplasm of breast  -     LALITO NOAH DIGITAL SCREEN BILATERAL; Future  13. Colon cancer screening  -     Roseanne Varela MD, Gastroenterology, [facility] adult exam  -  Anticipatory Guidance  Injury Prevention  Lap-shoulder belts, Smoke detectors, Carbon monoxide detectors,  removal if appropriate and  Occupational risk counseling  Substance Abuse  - Tobacco/alcohol/drug cessation or never starting any of those. Include pharmacotherapy, social support for cessation if applicable to patient, and skills training/problem solving. Availability of treatment for abuse. Sexual Behavior  - STD prevention; abstinence; avoid high-risk behavior; condoms/female barrier with spermicide,  Contraception   Diet and Exercise   - Limit fat and cholesterol; maintain caloric balance; emphasized grains, fruits and vegetables. Moderation in sodium/caffeine intake, saturated fat and cholesterol, caloric balance, sufficient intake of fresh fruits, vegetables, fiber, calcium. Regular physical activity at least 150 minutes per week to maintain activity. Stressed the importance of regular exercise.     Protection from UV Light: Discussed using Hats and sun block when exposed to direct sunlight. To schedule general skin exam with Dermatology. Abuse and Violence: violence prevention at home, school and in social situations  Dental Health: Discussed importance of regular tooth brushing, flossing, and dental visits. Immunizations reviewed : Discussed with patient  Personalized Preventive Plan   Current Health Maintenance Status  Immunization History   Administered Date(s) Administered    INFLUENZA, INTRADERMAL, QUADRIVALENT, PRESERVATIVE FREE 11/23/2016, 11/27/2017    Influenza Virus Vaccine 10/25/2010    Influenza, FLUARIX, FLULAVAL, FLUZONE (age 10 mo+) AND AFLURIA, (age 1 y+), PF, 0.5mL 12/16/2020    Influenza, FLUCELVAX, (age 10 mo+), MDCK, PF, 0.5mL 10/27/2022    Influenza, Intradermal, Preservative free 01/10/2013, 11/05/2013, 11/03/2015    PPD Test 05/26/2009, 06/25/2018    Tdap (Boostrix, Adacel) 09/08/2014        Health Maintenance   Topic Date Due    COVID-19 Vaccine (1) Never done    HIV screen  Never done    Colorectal Cancer Screen  Never done    Lipids  08/05/2023    Depression Screen  08/15/2023    DTaP/Tdap/Td vaccine (2 - Td or Tdap) 09/08/2024    Flu vaccine  Completed    Hepatitis C screen  Completed    Hepatitis A vaccine  Aged Out    Hib vaccine  Aged Out    Meningococcal (ACWY) vaccine  Aged Out    Pneumococcal 0-64 years Vaccine  Aged Out     Recommendations for [employer] Due: see orders and patient instructions/AVS.    Return in about 6 months (around 8/22/2023) for Routine follow-up.

## 2023-02-23 LAB
A/G RATIO: 1.8 (ref 1.1–2.2)
ALBUMIN SERPL-MCNC: 4.4 G/DL (ref 3.4–5)
ALP BLD-CCNC: 76 U/L (ref 40–129)
ALT SERPL-CCNC: 19 U/L (ref 10–40)
ANION GAP SERPL CALCULATED.3IONS-SCNC: 14 MMOL/L (ref 3–16)
AST SERPL-CCNC: 24 U/L (ref 15–37)
BILIRUB SERPL-MCNC: 0.7 MG/DL (ref 0–1)
BUN BLDV-MCNC: 8 MG/DL (ref 7–20)
CALCIUM SERPL-MCNC: 9 MG/DL (ref 8.3–10.6)
CHLORIDE BLD-SCNC: 104 MMOL/L (ref 99–110)
CO2: 22 MMOL/L (ref 21–32)
CREAT SERPL-MCNC: 0.7 MG/DL (ref 0.6–1.1)
ESTIMATED AVERAGE GLUCOSE: 105.4 MG/DL
FERRITIN: 100.6 NG/ML (ref 15–150)
FOLATE: 15.87 NG/ML (ref 4.78–24.2)
GFR SERPL CREATININE-BSD FRML MDRD: >60 ML/MIN/{1.73_M2}
GLUCOSE BLD-MCNC: 98 MG/DL (ref 70–99)
HBA1C MFR BLD: 5.3 %
HIV AG/AB: NORMAL
HIV ANTIGEN: NORMAL
HIV-1 ANTIBODY: NORMAL
HIV-2 AB: NORMAL
IRON SATURATION: 60 % (ref 15–50)
IRON: 193 UG/DL (ref 37–145)
MAGNESIUM: 1.9 MG/DL (ref 1.8–2.4)
POTASSIUM SERPL-SCNC: 4.2 MMOL/L (ref 3.5–5.1)
SODIUM BLD-SCNC: 140 MMOL/L (ref 136–145)
TOTAL IRON BINDING CAPACITY: 320 UG/DL (ref 260–445)
TOTAL PROTEIN: 6.8 G/DL (ref 6.4–8.2)
TSH REFLEX: 2.88 UIU/ML (ref 0.27–4.2)
VITAMIN B-12: 449 PG/ML (ref 211–911)

## 2023-02-24 LAB
CHOLESTEROL, TOTAL: 185 MG/DL (ref 0–199)
GLUCOSE FASTING: 98 MG/DL (ref 70–99)
HDLC SERPL-MCNC: 82 MG/DL (ref 40–60)
LDL CHOLESTEROL CALCULATED: 80 MG/DL
TRIGL SERPL-MCNC: 114 MG/DL (ref 0–150)
VLDLC SERPL CALC-MCNC: 23 MG/DL

## 2023-03-08 ENCOUNTER — TELEMEDICINE (OUTPATIENT)
Dept: PRIMARY CARE CLINIC | Age: 48
End: 2023-03-08
Payer: COMMERCIAL

## 2023-03-08 ENCOUNTER — TELEPHONE (OUTPATIENT)
Dept: PRIMARY CARE CLINIC | Age: 48
End: 2023-03-08

## 2023-03-08 DIAGNOSIS — H10.9 CONJUNCTIVITIS, UNSPECIFIED CONJUNCTIVITIS TYPE, UNSPECIFIED LATERALITY: Primary | ICD-10-CM

## 2023-03-08 DIAGNOSIS — J06.9 VIRAL URI WITH COUGH: Primary | ICD-10-CM

## 2023-03-08 PROCEDURE — 99213 OFFICE O/P EST LOW 20 MIN: CPT | Performed by: NURSE PRACTITIONER

## 2023-03-08 RX ORDER — DEXTROMETHORPHAN HYDROBROMIDE AND PROMETHAZINE HYDROCHLORIDE 15; 6.25 MG/5ML; MG/5ML
5 SYRUP ORAL NIGHTLY PRN
Qty: 50 ML | Refills: 0 | Status: SHIPPED | OUTPATIENT
Start: 2023-03-08 | End: 2023-03-18

## 2023-03-08 RX ORDER — CIPROFLOXACIN HYDROCHLORIDE 3.5 MG/ML
1 SOLUTION/ DROPS TOPICAL
Qty: 5 ML | Refills: 0 | Status: SHIPPED | OUTPATIENT
Start: 2023-03-08 | End: 2023-03-18

## 2023-03-08 RX ORDER — GUAIFENESIN 600 MG/1
1200 TABLET, EXTENDED RELEASE ORAL 2 TIMES DAILY
Qty: 40 TABLET | Refills: 0 | Status: SHIPPED | OUTPATIENT
Start: 2023-03-08 | End: 2023-03-18

## 2023-03-08 ASSESSMENT — ENCOUNTER SYMPTOMS
WHEEZING: 0
NAUSEA: 1
SINUS PAIN: 0
VOICE CHANGE: 1
COUGH: 1
SHORTNESS OF BREATH: 0
SORE THROAT: 1
EYE ITCHING: 1

## 2023-03-08 NOTE — TELEPHONE ENCOUNTER
Erica called and said she has been fighting an upper respiratory infection and now she has pink eye. That started last night. She was wanting to see Dr. Bhakta today but there is no availability until tomorrow and she wanted to know what to see if Dr. Bhakta would prescribe something for her.Please call to advise.

## 2023-03-08 NOTE — TELEPHONE ENCOUNTER
Pt was informed that her eye drops were sent to the pharmacy. She scheduled a VV with a Virtualist for the URI.

## 2023-03-08 NOTE — LETTER
I had the pleasure of seeing Alicia Lee today for a primary care Virtualist video visit. Our team would love your overall feedback on this visit. Please hit shift and click the following link to let us know if the Virtualist service met your expectations. [facility] [email or web address]      Electronically signed by SHERON Gill CNP on 3/8/23 at 2:55 PM EST.

## 2023-03-08 NOTE — TELEPHONE ENCOUNTER
Please let patient know I sent eyedrops for pinkeye/conjunctivitis.   If she feels she needs to get  URI symptoms addressed she could either schedule a virtual visit with us or schedule with [facility] virtualist.

## 2023-03-08 NOTE — PROGRESS NOTES
Javier Tsang (:  1975) is a Established patient, here for evaluation of the following:    URI (X 1 week plus woke with pink eye today)       Assessment & Plan:  Below is the assessment and plan developed based on review of pertinent history, physical exam, labs, studies, and medications. 1. Viral URI with cough  -     promethazine-dextromethorphan (PROMETHAZINE-DM) 6.25-15 MG/5ML syrup; Take 5 mLs by mouth nightly as needed for Cough, Disp-50 mL, R-0Normal  -     guaiFENesin (MUCINEX) 600 MG extended release tablet; Take 2 tablets by mouth 2 times daily for 10 days, Disp-40 tablet, R-0Normal    Recommend home covid testing. Patient will notify the virtualist of the results. Continue with OTC pain reliever. Follow up if no improvement. The patient was educated on reasons to seek urgent medical care including chest pain, shortness of breath or difficulty breathing at rest, severe pain, fever >102 not controlled by medication. Patient verbalized understanding    Return if symptoms worsen or fail to improve, for Viral uri with cough. Subjective: no home covid test. her daughter was sick with similar symptoms. Headache across her eyes  URI   This is a new problem. The current episode started in the past 7 days. The problem has been unchanged. There has been no fever. Associated symptoms include congestion, coughing (productive), ear pain, headaches, nausea and a sore throat. Pertinent negatives include no sinus pain or wheezing. She has tried acetaminophen and NSAIDs (dayquil) for the symptoms. The treatment provided mild relief. Review of Systems   Constitutional:  Positive for fatigue. Negative for chills. HENT:  Positive for congestion, ear pain, postnasal drip, sore throat and voice change. Negative for sinus pain. Eyes:  Positive for itching. Respiratory:  Positive for cough (productive). Negative for shortness of breath and wheezing. Gastrointestinal:  Positive for nausea. Musculoskeletal:  Positive for myalgias. Neurological:  Positive for headaches. Eyes feel like she has watery, itchy eyes, with crusting this AM  Feels like she has something in her eyes  Watery or white mucus from her eyes periodically.        Objective:  Patient-Reported Vitals  No data recorded     Patient-Reported Vitals 3/8/2023   Patient-Reported Weight 144   Patient-Reported Height 5'8\"   Patient-Reported Systolic 296   Patient-Reported Diastolic 76   Patient-Reported Pulse 72   Patient-Reported Temperature 99.2   Patient-Reported SpO2 98        Physical Exam:  [INSTRUCTIONS:  \"[x]\" Indicates a positive item  \"[]\" Indicates a negative item  -- DELETE ALL ITEMS NOT EXAMINED]    Constitutional: [x] Appears well-developed and well-nourished [x] No apparent distress      [] Abnormal -     Mental status: [x] Alert and awake  [x] Oriented to person/place/time [x] Able to follow commands    [] Abnormal -     Eyes:   EOM    [x]  Normal    [] Abnormal -   Sclera  [x]  Normal    [] Abnormal -          Discharge []  None visible   [x] Abnormal - watery, bilateral d/c,   no erythema, edema    HENT: [x] Normocephalic, atraumatic  [x] Abnormal - Left side tonsil with pink papule, slightly erythematous, PND  [x] Mouth/Throat: Mucous membranes are moist    External Ears [x] Normal  [] Abnormal -    Neck: [x] No visualized mass [] Abnormal -     Pulmonary/Chest: [x] Respiratory effort normal   [x] No visualized signs of difficulty breathing or respiratory distress        [] Abnormal -      Musculoskeletal:   [] Normal gait with no signs of ataxia         [x] Normal range of motion of neck        [] Abnormal -     Neurological:        [x] No Facial Asymmetry (Cranial nerve 7 motor function) (limited exam due to video visit)          [x] No gaze palsy        [] Abnormal -          Skin:        [x] No significant exanthematous lesions or discoloration noted on facial skin         [] Abnormal -            Psychiatric: [x] Normal Affect [] Abnormal -        [] No Hallucinations    Other pertinent observable physical exam findings:-        On this date 3/8/2023 I have spent 25 minutes reviewing previous notes, test results and face to face (virtual) with the patient discussing the diagnosis and importance of compliance with the treatment plan as well as documenting on the day of the visit. Hanna Brown was evaluated through a synchronous (real-time) audio-video encounter. The patient (or guardian if applicable) is aware that this is a billable service, which includes applicable co-pays. This Virtual Visit was conducted with patient's (and/or legal guardian's) consent. The visit was conducted pursuant to the emergency declaration under the [address] [place] [address] authority and the [employer] and [place] General Act. Patient identification was verified, and a caregiver was present when appropriate.    The patient was located at Home: [address] [place] [address] Road [id number]  Provider was located at Home ([facility]): [place]         --SHERON South - CNP

## 2023-03-24 PROBLEM — Z00.00 ENCOUNTER FOR WELL ADULT EXAM WITHOUT ABNORMAL FINDINGS: Status: RESOLVED | Noted: 2023-02-22 | Resolved: 2023-03-24

## 2023-05-09 DIAGNOSIS — E78.2 MIXED HYPERLIPIDEMIA: ICD-10-CM

## 2023-05-09 DIAGNOSIS — I10 BENIGN ESSENTIAL HTN: ICD-10-CM

## 2023-05-09 RX ORDER — PRAVASTATIN SODIUM 10 MG
10 TABLET ORAL NIGHTLY
Qty: 90 TABLET | Refills: 1 | Status: SHIPPED | OUTPATIENT
Start: 2023-05-09

## 2023-05-09 RX ORDER — LOSARTAN POTASSIUM 100 MG/1
100 TABLET ORAL DAILY
Qty: 90 TABLET | Refills: 1 | Status: SHIPPED | OUTPATIENT
Start: 2023-05-09

## 2023-08-08 ENCOUNTER — OFFICE VISIT (OUTPATIENT)
Dept: PRIMARY CARE CLINIC | Age: 48
End: 2023-08-08
Payer: COMMERCIAL

## 2023-08-08 VITALS
BODY MASS INDEX: 21.28 KG/M2 | HEART RATE: 84 BPM | DIASTOLIC BLOOD PRESSURE: 86 MMHG | WEIGHT: 140.4 LBS | OXYGEN SATURATION: 97 % | HEIGHT: 68 IN | RESPIRATION RATE: 14 BRPM | SYSTOLIC BLOOD PRESSURE: 124 MMHG

## 2023-08-08 DIAGNOSIS — R10.84 GENERALIZED ABDOMINAL PAIN: Primary | ICD-10-CM

## 2023-08-08 DIAGNOSIS — I10 BENIGN ESSENTIAL HTN: ICD-10-CM

## 2023-08-08 DIAGNOSIS — F41.1 GENERALIZED ANXIETY DISORDER: ICD-10-CM

## 2023-08-08 DIAGNOSIS — M79.2 NEUROPATHIC PAIN: ICD-10-CM

## 2023-08-08 DIAGNOSIS — R10.84 GENERALIZED ABDOMINAL PAIN: ICD-10-CM

## 2023-08-08 LAB
ALBUMIN SERPL-MCNC: 4.4 G/DL (ref 3.4–5)
ALBUMIN/GLOB SERPL: 2 {RATIO} (ref 1.1–2.2)
ALP SERPL-CCNC: 75 U/L (ref 40–129)
ALT SERPL-CCNC: 27 U/L (ref 10–40)
ANION GAP SERPL CALCULATED.3IONS-SCNC: 9 MMOL/L (ref 3–16)
AST SERPL-CCNC: 30 U/L (ref 15–37)
BASOPHILS # BLD: 0 K/UL (ref 0–0.2)
BASOPHILS NFR BLD: 0.4 %
BILIRUB SERPL-MCNC: 0.8 MG/DL (ref 0–1)
BUN SERPL-MCNC: 9 MG/DL (ref 7–20)
CALCIUM SERPL-MCNC: 9.2 MG/DL (ref 8.3–10.6)
CHLORIDE SERPL-SCNC: 104 MMOL/L (ref 99–110)
CO2 SERPL-SCNC: 27 MMOL/L (ref 21–32)
CREAT SERPL-MCNC: 0.7 MG/DL (ref 0.6–1.1)
DEPRECATED RDW RBC AUTO: 12.7 % (ref 12.4–15.4)
EOSINOPHIL # BLD: 0.1 K/UL (ref 0–0.6)
EOSINOPHIL NFR BLD: 1.2 %
GFR SERPLBLD CREATININE-BSD FMLA CKD-EPI: >60 ML/MIN/{1.73_M2}
GLUCOSE SERPL-MCNC: 140 MG/DL (ref 70–99)
HCT VFR BLD AUTO: 40.5 % (ref 36–48)
HGB BLD-MCNC: 13.8 G/DL (ref 12–16)
LYMPHOCYTES # BLD: 1.8 K/UL (ref 1–5.1)
LYMPHOCYTES NFR BLD: 33.4 %
MCH RBC QN AUTO: 34.7 PG (ref 26–34)
MCHC RBC AUTO-ENTMCNC: 34.1 G/DL (ref 31–36)
MCV RBC AUTO: 102 FL (ref 80–100)
MONOCYTES # BLD: 0.4 K/UL (ref 0–1.3)
MONOCYTES NFR BLD: 8.2 %
NEUTROPHILS # BLD: 3.1 K/UL (ref 1.7–7.7)
NEUTROPHILS NFR BLD: 56.8 %
PLATELET # BLD AUTO: 333 K/UL (ref 135–450)
PMV BLD AUTO: 7.8 FL (ref 5–10.5)
POTASSIUM SERPL-SCNC: 4.1 MMOL/L (ref 3.5–5.1)
PROT SERPL-MCNC: 6.6 G/DL (ref 6.4–8.2)
RBC # BLD AUTO: 3.97 M/UL (ref 4–5.2)
SODIUM SERPL-SCNC: 140 MMOL/L (ref 136–145)
WBC # BLD AUTO: 5.4 K/UL (ref 4–11)

## 2023-08-08 PROCEDURE — G8420 CALC BMI NORM PARAMETERS: HCPCS | Performed by: INTERNAL MEDICINE

## 2023-08-08 PROCEDURE — 1036F TOBACCO NON-USER: CPT | Performed by: INTERNAL MEDICINE

## 2023-08-08 PROCEDURE — 3079F DIAST BP 80-89 MM HG: CPT | Performed by: INTERNAL MEDICINE

## 2023-08-08 PROCEDURE — G8427 DOCREV CUR MEDS BY ELIG CLIN: HCPCS | Performed by: INTERNAL MEDICINE

## 2023-08-08 PROCEDURE — 99213 OFFICE O/P EST LOW 20 MIN: CPT | Performed by: INTERNAL MEDICINE

## 2023-08-08 PROCEDURE — 3074F SYST BP LT 130 MM HG: CPT | Performed by: INTERNAL MEDICINE

## 2023-08-08 RX ORDER — LOSARTAN POTASSIUM 100 MG/1
100 TABLET ORAL DAILY
Qty: 90 TABLET | Refills: 1 | Status: SHIPPED | OUTPATIENT
Start: 2023-08-08

## 2023-08-08 RX ORDER — DULOXETIN HYDROCHLORIDE 60 MG/1
CAPSULE, DELAYED RELEASE ORAL
Qty: 90 CAPSULE | Refills: 1 | Status: SHIPPED | OUTPATIENT
Start: 2023-08-08

## 2023-08-08 ASSESSMENT — ENCOUNTER SYMPTOMS
ABDOMINAL PAIN: 1
NAUSEA: 0
CHEST TIGHTNESS: 0
BACK PAIN: 0
SINUS PRESSURE: 0
VOMITING: 0
COUGH: 0
SORE THROAT: 0
EYE PAIN: 0
BLOOD IN STOOL: 0
TROUBLE SWALLOWING: 0
WHEEZING: 0
COLOR CHANGE: 0
CONSTIPATION: 0
ABDOMINAL DISTENTION: 0
SHORTNESS OF BREATH: 0
EYE REDNESS: 0
DIARRHEA: 0

## 2023-08-08 NOTE — PROGRESS NOTES
Rosemarie Lanza is 51 y.o. female who p/w Abdominal Pain     Patient complaining of abdominal pain for over 2 weeks. Patient said the pain is felt all over her abdomen but  right lower quadrant  intensity is graded than left lower quadrant. Pain is intermittent but she feels it every day describes it as severe cramping. Pain is associated with nausea sensation of bloating, loose bowels. Patient denies fever/chills. Patient is s/p cholecystectomy and hysterectomy. Patient will like to know what the next step is and treatment options are. The problem and medicine lists and chart were reviewed in detail. Review of Systems   Constitutional:  Negative for activity change, appetite change, chills, fatigue, fever and unexpected weight change. HENT:  Negative for congestion, ear pain, postnasal drip, sinus pressure, sore throat, tinnitus and trouble swallowing. Eyes:  Negative for pain and redness. Respiratory:  Negative for cough, chest tightness, shortness of breath and wheezing. Cardiovascular:  Negative for chest pain, palpitations and leg swelling. Gastrointestinal:  Positive for abdominal pain. Negative for abdominal distention, blood in stool, constipation, diarrhea, nausea and vomiting. Endocrine: Negative for cold intolerance, heat intolerance and polydipsia. Genitourinary:  Negative for decreased urine volume, dysuria, flank pain, frequency, hematuria and urgency. Musculoskeletal:  Negative for arthralgias, back pain, joint swelling, neck pain and neck stiffness. Skin:  Negative for color change and rash. Neurological:  Negative for dizziness, weakness, numbness and headaches. Hematological:  Negative for adenopathy. Psychiatric/Behavioral:  Negative for behavioral problems, sleep disturbance and suicidal ideas. The patient is not nervous/anxious.        /86 (Site: Left Upper Arm, Position: Sitting, Cuff Size: Medium Adult)   Pulse 84   Resp 14   Ht 5' 8\" (1.727 m)

## 2023-08-08 NOTE — ASSESSMENT & PLAN NOTE
We will investigate with imaging and blood work. We discussed red flag symptoms patient to go to the ED if she has symptoms including but not limited to high fever/chills, nausea/vomiting, progressive pain which is now constant with or without abdominal distention. Severe pain with coughing/sneezin; obstipation. Patient confirm understanding verbally.

## 2023-08-14 ENCOUNTER — HOSPITAL ENCOUNTER (OUTPATIENT)
Dept: CT IMAGING | Age: 48
Discharge: HOME | End: 2023-08-14
Payer: COMMERCIAL

## 2023-08-14 DIAGNOSIS — R10.84 GENERALIZED ABDOMINAL PAIN: ICD-10-CM

## 2023-08-14 PROCEDURE — 6360000004 HC RX CONTRAST MEDICATION: Performed by: INTERNAL MEDICINE

## 2023-08-14 PROCEDURE — 74177 CT ABD & PELVIS W/CONTRAST: CPT

## 2023-08-14 RX ADMIN — IOMEPROL INJECTION 100 ML: 714 INJECTION, SOLUTION INTRAVASCULAR at 08:37

## 2023-08-14 RX ADMIN — DIATRIZOATE MEGLUMINE AND DIATRIZOATE SODIUM 30 ML: 660; 100 LIQUID ORAL; RECTAL at 07:15

## 2023-08-17 ENCOUNTER — TELEPHONE (OUTPATIENT)
Dept: PRIMARY CARE CLINIC | Age: 48
End: 2023-08-17

## 2023-08-17 NOTE — TELEPHONE ENCOUNTER
Destiny Carroll received her results from her CT scan and wanted to know what her next steps should be. She has not heard from the office rgearding her results. Please call to advise.

## 2023-08-18 DIAGNOSIS — Z12.11 COLON CANCER SCREENING: Primary | ICD-10-CM

## 2023-08-18 NOTE — TELEPHONE ENCOUNTER
Please let patient know I referred her for her screening colonoscopy. I am not worried about the bibasilar atelectasis. Deep breathing exercises could help this.

## 2023-08-29 ENCOUNTER — OFFICE VISIT (OUTPATIENT)
Dept: PRIMARY CARE CLINIC | Age: 48
End: 2023-08-29
Payer: COMMERCIAL

## 2023-08-29 VITALS
HEIGHT: 68 IN | BODY MASS INDEX: 21.76 KG/M2 | OXYGEN SATURATION: 97 % | HEART RATE: 88 BPM | WEIGHT: 143.6 LBS | RESPIRATION RATE: 14 BRPM | SYSTOLIC BLOOD PRESSURE: 108 MMHG | DIASTOLIC BLOOD PRESSURE: 72 MMHG

## 2023-08-29 DIAGNOSIS — F41.1 GENERALIZED ANXIETY DISORDER: ICD-10-CM

## 2023-08-29 DIAGNOSIS — Z12.31 ENCOUNTER FOR SCREENING MAMMOGRAM FOR MALIGNANT NEOPLASM OF BREAST: ICD-10-CM

## 2023-08-29 DIAGNOSIS — E83.110 HEREDITARY HEMOCHROMATOSIS (HCC): ICD-10-CM

## 2023-08-29 DIAGNOSIS — M79.7 FIBROMYALGIA: ICD-10-CM

## 2023-08-29 DIAGNOSIS — E78.2 MIXED HYPERLIPIDEMIA: ICD-10-CM

## 2023-08-29 DIAGNOSIS — I10 BENIGN ESSENTIAL HTN: Primary | ICD-10-CM

## 2023-08-29 PROCEDURE — 1036F TOBACCO NON-USER: CPT | Performed by: INTERNAL MEDICINE

## 2023-08-29 PROCEDURE — 3078F DIAST BP <80 MM HG: CPT | Performed by: INTERNAL MEDICINE

## 2023-08-29 PROCEDURE — G8427 DOCREV CUR MEDS BY ELIG CLIN: HCPCS | Performed by: INTERNAL MEDICINE

## 2023-08-29 PROCEDURE — 3074F SYST BP LT 130 MM HG: CPT | Performed by: INTERNAL MEDICINE

## 2023-08-29 PROCEDURE — G8420 CALC BMI NORM PARAMETERS: HCPCS | Performed by: INTERNAL MEDICINE

## 2023-08-29 PROCEDURE — 99214 OFFICE O/P EST MOD 30 MIN: CPT | Performed by: INTERNAL MEDICINE

## 2023-08-29 ASSESSMENT — ENCOUNTER SYMPTOMS
SINUS PRESSURE: 0
EYE REDNESS: 0
SORE THROAT: 0
TROUBLE SWALLOWING: 0
WHEEZING: 0
ABDOMINAL DISTENTION: 0
CONSTIPATION: 0
COUGH: 0
BLOOD IN STOOL: 0
COLOR CHANGE: 0
ABDOMINAL PAIN: 0
BACK PAIN: 0
VOMITING: 0
DIARRHEA: 0
SHORTNESS OF BREATH: 0
EYE PAIN: 0
CHEST TIGHTNESS: 0
NAUSEA: 0

## 2023-08-29 NOTE — PROGRESS NOTES
Rodríguez Smith (1975) is a 51 y.o. female   10 Month Follow-Up     General health: This patient presents for check up and refills. The problem and medicine lists and chart were reviewed in detail. The patient has been worked up and treated for this/these condition/s and is compliant with taking the medication without any significant side effects. The patient's condition/s is/are chronic and unchanged and otherwise remains stable. Feels well with minor complaints. Main Problem Review -Main Problem Review - HTN - Patient's BP is elevated today. Patient denies  severe headache, slurred speech, muscle weakness, chest pain on exertion or rest, paroxysmal nocturnal dyspnea, shortness of breath on exertion or at rest, palpitations, presyncope, diaphoresis, leg swelling. Other problems review    1. HLD - Patient denies complaints or symptoms of chest pain/shortness of breath with exertion or at rest.  Patient mentions they are adherent with treatment. Patient follows a low-fat diet, tries to exercise. 2. Fibromyalgia -patient had a negative EMG. Patient says duloxetine has helped considerably. She has been practicing yoga which she believes also helps. 3.  Hereditary hemochromatosis - Patient denies complaints or symptoms today. Patient mentions she has upcoming visit with hematologist in 12/2022. Health Maintenance    Last Papsmear-s/p  Total hysterectomy  (21-66 y/o)  Annual retinal eye exam - Patient due  will need to schedule   Annual Dentist visit - 3/2022  Tobacco smoking - NO  Alcohol Misuse - NO  Illicit Drug Use- NO  Healthy Diet and physical activity -  YES  Obesity Screen- screened 8/15/2022  Wears seat belt-  YES  End of life directives discussed with patient. -Mentions she does not have  a will/or/an advanced directives. Was instructed to start process looking into preparing her will an advanced directives.    The 10-year ASCVD risk score (Tamara OLMEDO, et al., 2019) is: 0.6%    Values

## 2023-09-28 ENCOUNTER — TELEPHONE (OUTPATIENT)
Dept: PRIMARY CARE CLINIC | Age: 48
End: 2023-09-28

## 2023-09-28 DIAGNOSIS — M79.7 FIBROMYALGIA: Primary | ICD-10-CM

## 2023-09-28 RX ORDER — TIZANIDINE HYDROCHLORIDE 4 MG/1
4 CAPSULE, GELATIN COATED ORAL 3 TIMES DAILY PRN
Qty: 90 CAPSULE | Refills: 2 | Status: SHIPPED | OUTPATIENT
Start: 2023-09-28

## 2023-09-28 NOTE — TELEPHONE ENCOUNTER
Please let patient know prescription for Zanaflex sent to the pharmacy. This medicine is sedating, patient has exercise caution driving.

## 2023-09-28 NOTE — TELEPHONE ENCOUNTER
Patient called to say that her fibromyalgia was acting up. She asked if Dr. Delmas Gaucher could prescribe a muscle relaxer for her. She said she had one prescribed years ago but she has run out.

## 2023-10-04 DIAGNOSIS — M79.7 FIBROMYALGIA: Primary | ICD-10-CM

## 2023-10-04 RX ORDER — CYCLOBENZAPRINE HCL 10 MG
10 TABLET ORAL 3 TIMES DAILY PRN
Qty: 30 TABLET | Refills: 5 | Status: SHIPPED | OUTPATIENT
Start: 2023-10-04 | End: 2023-12-03

## 2024-01-12 DIAGNOSIS — N95.1 PERIMENOPAUSAL: Primary | ICD-10-CM

## 2024-01-12 RX ORDER — ESTRADIOL 1 MG/1
1 TABLET ORAL DAILY
Qty: 90 TABLET | Refills: 3 | Status: SHIPPED | OUTPATIENT
Start: 2024-01-12

## 2024-01-28 DIAGNOSIS — M79.2 NEUROPATHIC PAIN: ICD-10-CM

## 2024-01-28 DIAGNOSIS — F41.1 GENERALIZED ANXIETY DISORDER: ICD-10-CM

## 2024-01-29 DIAGNOSIS — E78.2 MIXED HYPERLIPIDEMIA: ICD-10-CM

## 2024-01-29 RX ORDER — PRAVASTATIN SODIUM 10 MG
10 TABLET ORAL NIGHTLY
Qty: 90 TABLET | Refills: 1 | Status: SHIPPED | OUTPATIENT
Start: 2024-01-29

## 2024-01-29 RX ORDER — DULOXETIN HYDROCHLORIDE 60 MG/1
CAPSULE, DELAYED RELEASE ORAL
Qty: 30 CAPSULE | Refills: 5 | Status: SHIPPED | OUTPATIENT
Start: 2024-01-29

## 2024-02-29 ENCOUNTER — OFFICE VISIT (OUTPATIENT)
Dept: PRIMARY CARE CLINIC | Age: 49
End: 2024-02-29
Payer: COMMERCIAL

## 2024-02-29 VITALS
DIASTOLIC BLOOD PRESSURE: 88 MMHG | WEIGHT: 153.6 LBS | TEMPERATURE: 97.6 F | HEART RATE: 85 BPM | BODY MASS INDEX: 23.35 KG/M2 | OXYGEN SATURATION: 95 % | SYSTOLIC BLOOD PRESSURE: 138 MMHG

## 2024-02-29 DIAGNOSIS — R79.0 LOW MAGNESIUM LEVEL: ICD-10-CM

## 2024-02-29 DIAGNOSIS — E78.2 MIXED HYPERLIPIDEMIA: ICD-10-CM

## 2024-02-29 DIAGNOSIS — R79.89 LOW VITAMIN B12 LEVEL: ICD-10-CM

## 2024-02-29 DIAGNOSIS — I10 BENIGN ESSENTIAL HTN: ICD-10-CM

## 2024-02-29 DIAGNOSIS — Z71.89 ACP (ADVANCE CARE PLANNING): ICD-10-CM

## 2024-02-29 DIAGNOSIS — R59.0 ENLARGED LYMPH NODE IN NECK: ICD-10-CM

## 2024-02-29 DIAGNOSIS — F17.200 VAPING NICOTINE DEPENDENCE, NON-TOBACCO PRODUCT: ICD-10-CM

## 2024-02-29 DIAGNOSIS — M79.7 FIBROMYALGIA: ICD-10-CM

## 2024-02-29 DIAGNOSIS — F41.1 GENERALIZED ANXIETY DISORDER: ICD-10-CM

## 2024-02-29 DIAGNOSIS — R53.83 OTHER FATIGUE: ICD-10-CM

## 2024-02-29 DIAGNOSIS — R73.09 IMPAIRED GLUCOSE REGULATION: ICD-10-CM

## 2024-02-29 DIAGNOSIS — E83.110 HEREDITARY HEMOCHROMATOSIS (HCC): ICD-10-CM

## 2024-02-29 DIAGNOSIS — E55.9 VITAMIN D DEFICIENCY: ICD-10-CM

## 2024-02-29 DIAGNOSIS — Z00.00 ENCOUNTER FOR WELL ADULT EXAM WITHOUT ABNORMAL FINDINGS: Primary | ICD-10-CM

## 2024-02-29 PROCEDURE — G8484 FLU IMMUNIZE NO ADMIN: HCPCS | Performed by: INTERNAL MEDICINE

## 2024-02-29 PROCEDURE — 99396 PREV VISIT EST AGE 40-64: CPT | Performed by: INTERNAL MEDICINE

## 2024-02-29 PROCEDURE — 3075F SYST BP GE 130 - 139MM HG: CPT | Performed by: INTERNAL MEDICINE

## 2024-02-29 PROCEDURE — 3079F DIAST BP 80-89 MM HG: CPT | Performed by: INTERNAL MEDICINE

## 2024-02-29 RX ORDER — LOSARTAN POTASSIUM 100 MG/1
100 TABLET ORAL DAILY
Qty: 90 TABLET | Refills: 3 | Status: SHIPPED | OUTPATIENT
Start: 2024-02-29

## 2024-02-29 SDOH — ECONOMIC STABILITY: FOOD INSECURITY: WITHIN THE PAST 12 MONTHS, THE FOOD YOU BOUGHT JUST DIDN'T LAST AND YOU DIDN'T HAVE MONEY TO GET MORE.: NEVER TRUE

## 2024-02-29 SDOH — ECONOMIC STABILITY: INCOME INSECURITY: HOW HARD IS IT FOR YOU TO PAY FOR THE VERY BASICS LIKE FOOD, HOUSING, MEDICAL CARE, AND HEATING?: NOT VERY HARD

## 2024-02-29 SDOH — ECONOMIC STABILITY: FOOD INSECURITY: WITHIN THE PAST 12 MONTHS, YOU WORRIED THAT YOUR FOOD WOULD RUN OUT BEFORE YOU GOT MONEY TO BUY MORE.: NEVER TRUE

## 2024-02-29 ASSESSMENT — ENCOUNTER SYMPTOMS
EYE PAIN: 0
COLOR CHANGE: 0
ABDOMINAL PAIN: 0
COUGH: 0
BACK PAIN: 0
DIARRHEA: 0
SINUS PRESSURE: 0
CHEST TIGHTNESS: 0
SORE THROAT: 0
VOMITING: 0
SHORTNESS OF BREATH: 0
WHEEZING: 0
ABDOMINAL DISTENTION: 0
CONSTIPATION: 0
BLOOD IN STOOL: 0
EYE REDNESS: 0
TROUBLE SWALLOWING: 0
NAUSEA: 0

## 2024-02-29 ASSESSMENT — PATIENT HEALTH QUESTIONNAIRE - PHQ9
SUM OF ALL RESPONSES TO PHQ QUESTIONS 1-9: 0
SUM OF ALL RESPONSES TO PHQ QUESTIONS 1-9: 0
SUM OF ALL RESPONSES TO PHQ9 QUESTIONS 1 & 2: 0
SUM OF ALL RESPONSES TO PHQ QUESTIONS 1-9: 0
2. FEELING DOWN, DEPRESSED OR HOPELESS: 0
SUM OF ALL RESPONSES TO PHQ QUESTIONS 1-9: 0
1. LITTLE INTEREST OR PLEASURE IN DOING THINGS: 0

## 2024-02-29 NOTE — PATIENT INSTRUCTIONS
Advance Care Planning     Advance Care Planning opens a door to talk about and write down your wishes before a sudden accident or illness.  Make your goals, values, and preferences known.     This puts you in the ’s seat and helps others know what matters most to you so they won’t have to guess.      Where can you learn more?    Go to [email or web address]   to learn how to:    Name someone you trust to make healthcare decisions for you, only if you can’t. (Healthcare Power of )    Document your wishes for care if you were seriously ill and not expected to recover or are approaching end of life. (Advance Directive or Living Will)    The same page can be found using the QR code below.                Quitting Tobacco: Care Instructions  Quitting tobacco is much harder than simply changing a habit. Nicotine cravings make it hard to quit, but you can do it. Your doctor will help you set up the plan that best meets your needs.    You will miss the nicotine at first. You may feel short-tempered and grumpy. You may have trouble sleeping or thinking clearly. The urge to use tobacco may continue for a time.   Combining tools can raise your chances of success. You can use medicine along with counseling. And you can join a quit-tobacco program, such as the American Lung Association's Freedom from Smoking program.     Get support.  Reach out to family and friends, and find a counselor to help you quit. Join a support group, such as Nicotine Anonymous. Go to www.smokefree.gov to sign up for text messaging support.     Talk to your doctor or pharmacist about medicines that can help you quit.  Medicines can help with cravings and withdrawal symptoms. There are several over-the-counter choices, such as nicotine patches, gum, and lozenges.     After you quit, do not use tobacco again, not even once.  Get rid of all tobacco products and anything that reminds you of tobacco, such

## 2024-02-29 NOTE — PROGRESS NOTES
round, and reactive to light.   Neck:      Vascular: No carotid bruit.      Comments: small lymph nodes occipital chain left side  Cardiovascular:      Rate and Rhythm: Normal rate and regular rhythm.      Pulses: Normal pulses.      Heart sounds: Normal heart sounds. No murmur heard.     No gallop.   Pulmonary:      Effort: Pulmonary effort is normal.      Breath sounds: Normal breath sounds. No wheezing, rhonchi or rales.   Abdominal:      General: Abdomen is flat. Bowel sounds are normal. There is no distension.      Palpations: Abdomen is soft.      Tenderness: There is no abdominal tenderness. There is no guarding or rebound.   Musculoskeletal:         General: No swelling or tenderness. Normal range of motion.      Cervical back: No tenderness.   Lymphadenopathy:      Cervical: No cervical adenopathy.   Skin:     Findings: No erythema, lesion or rash.   Neurological:      General: No focal deficit present.      Mental Status: She is alert and oriented to person, place, and time. Mental status is at baseline.      Cranial Nerves: No cranial nerve deficit.      Motor: No weakness.   Psychiatric:         Mood and Affect: Mood normal.         Behavior: Behavior normal.         Thought Content: Thought content normal.         Judgment: Judgment normal.         Assessment   Plan   1. Encounter for well adult exam without abnormal findings  Assessment & Plan:   Patient comes in for wellness exam   we discussed age appropriate USPSTF screens  Over 75% of the visit was spent counselling patient on appropriate lifestyle choices.     Orders:  -     CBC with Auto Differential; Future  -     Comprehensive Metabolic Panel; Future  -     Hemoglobin A1C; Future  -     Lipid Panel; Future  -     Magnesium; Future  -     TSH with Reflex; Future  -     Vitamin D 25 Hydroxy; Future  -     Vitamin B12 & Folate; Future  -     Ferritin; Future  -     Iron and TIBC; Future  2. Benign essential HTN  Assessment & Plan:  Blood pressure

## 2024-03-01 DIAGNOSIS — R73.09 IMPAIRED GLUCOSE REGULATION: ICD-10-CM

## 2024-03-01 DIAGNOSIS — R79.0 LOW MAGNESIUM LEVEL: ICD-10-CM

## 2024-03-01 DIAGNOSIS — R79.89 LOW VITAMIN B12 LEVEL: ICD-10-CM

## 2024-03-01 DIAGNOSIS — R53.83 OTHER FATIGUE: ICD-10-CM

## 2024-03-01 DIAGNOSIS — E55.9 VITAMIN D DEFICIENCY: ICD-10-CM

## 2024-03-01 DIAGNOSIS — E83.110 HEREDITARY HEMOCHROMATOSIS (HCC): ICD-10-CM

## 2024-03-01 DIAGNOSIS — I10 BENIGN ESSENTIAL HTN: ICD-10-CM

## 2024-03-01 DIAGNOSIS — Z00.00 ENCOUNTER FOR WELL ADULT EXAM WITHOUT ABNORMAL FINDINGS: ICD-10-CM

## 2024-03-01 DIAGNOSIS — E78.2 MIXED HYPERLIPIDEMIA: ICD-10-CM

## 2024-03-01 LAB
25(OH)D3 SERPL-MCNC: 36.8 NG/ML
ALBUMIN SERPL-MCNC: 5 G/DL (ref 3.4–5)
ALBUMIN/GLOB SERPL: 2 {RATIO} (ref 1.1–2.2)
ALP SERPL-CCNC: 69 U/L (ref 40–129)
ALT SERPL-CCNC: 18 U/L (ref 10–40)
ANION GAP SERPL CALCULATED.3IONS-SCNC: 13 MMOL/L (ref 3–16)
AST SERPL-CCNC: 22 U/L (ref 15–37)
BASOPHILS # BLD: 0 K/UL (ref 0–0.2)
BASOPHILS NFR BLD: 0.6 %
BILIRUB SERPL-MCNC: 0.9 MG/DL (ref 0–1)
BUN SERPL-MCNC: 7 MG/DL (ref 7–20)
CALCIUM SERPL-MCNC: 9.4 MG/DL (ref 8.3–10.6)
CHLORIDE SERPL-SCNC: 101 MMOL/L (ref 99–110)
CHOLEST SERPL-MCNC: 239 MG/DL (ref 0–199)
CO2 SERPL-SCNC: 27 MMOL/L (ref 21–32)
CREAT SERPL-MCNC: 0.7 MG/DL (ref 0.6–1.1)
DEPRECATED RDW RBC AUTO: 12.6 % (ref 12.4–15.4)
EOSINOPHIL # BLD: 0.1 K/UL (ref 0–0.6)
EOSINOPHIL NFR BLD: 1.3 %
FERRITIN SERPL IA-MCNC: 75.8 NG/ML (ref 15–150)
FOLATE SERPL-MCNC: 16.39 NG/ML (ref 4.78–24.2)
GFR SERPLBLD CREATININE-BSD FMLA CKD-EPI: >60 ML/MIN/{1.73_M2}
GLUCOSE SERPL-MCNC: 92 MG/DL (ref 70–99)
HCT VFR BLD AUTO: 41.1 % (ref 36–48)
HDLC SERPL-MCNC: 94 MG/DL (ref 40–60)
HGB BLD-MCNC: 14.2 G/DL (ref 12–16)
IRON SATN MFR SERPL: 58 % (ref 15–50)
IRON SERPL-MCNC: 198 UG/DL (ref 37–145)
LDLC SERPL CALC-MCNC: 128 MG/DL
LYMPHOCYTES # BLD: 1.9 K/UL (ref 1–5.1)
LYMPHOCYTES NFR BLD: 30 %
MAGNESIUM SERPL-MCNC: 2.1 MG/DL (ref 1.8–2.4)
MCH RBC QN AUTO: 34.5 PG (ref 26–34)
MCHC RBC AUTO-ENTMCNC: 34.5 G/DL (ref 31–36)
MCV RBC AUTO: 100.2 FL (ref 80–100)
MONOCYTES # BLD: 0.5 K/UL (ref 0–1.3)
MONOCYTES NFR BLD: 7.7 %
NEUTROPHILS # BLD: 3.7 K/UL (ref 1.7–7.7)
NEUTROPHILS NFR BLD: 60.4 %
PLATELET # BLD AUTO: 329 K/UL (ref 135–450)
PMV BLD AUTO: 7.8 FL (ref 5–10.5)
POTASSIUM SERPL-SCNC: 4.2 MMOL/L (ref 3.5–5.1)
PROT SERPL-MCNC: 7.5 G/DL (ref 6.4–8.2)
RBC # BLD AUTO: 4.11 M/UL (ref 4–5.2)
SODIUM SERPL-SCNC: 141 MMOL/L (ref 136–145)
TIBC SERPL-MCNC: 341 UG/DL (ref 260–445)
TRIGL SERPL-MCNC: 84 MG/DL (ref 0–150)
TSH SERPL DL<=0.005 MIU/L-ACNC: 1.63 UIU/ML (ref 0.27–4.2)
VIT B12 SERPL-MCNC: 400 PG/ML (ref 211–911)
VLDLC SERPL CALC-MCNC: 17 MG/DL
WBC # BLD AUTO: 6.2 K/UL (ref 4–11)

## 2024-03-02 LAB
EST. AVERAGE GLUCOSE BLD GHB EST-MCNC: 99.7 MG/DL
HBA1C MFR BLD: 5.1 %

## 2024-03-03 NOTE — RESULT ENCOUNTER NOTE
Please let patient know iron is 198 with a normal ferritin.  She will follow-up with her hematologist as instructed    The LDL is 128, should be under 100, could be treated low-fat diet and exercise.  10-year CVD risk score 1.1%

## 2024-03-30 PROBLEM — Z00.00 ENCOUNTER FOR WELL ADULT EXAM WITHOUT ABNORMAL FINDINGS: Status: RESOLVED | Noted: 2023-02-22 | Resolved: 2024-03-30

## 2024-04-09 ENCOUNTER — TELEPHONE (OUTPATIENT)
Dept: PRIMARY CARE CLINIC | Age: 49
End: 2024-04-09

## 2024-04-09 NOTE — TELEPHONE ENCOUNTER
I spoke with Erica and she didn't not notices small lymph nodes on the left back side of her neck.

## 2024-04-09 NOTE — TELEPHONE ENCOUNTER
Problem: Potential for injury, Restraints  Goal: # Remains free from injury  Outcome: Outcome Met, Complete Goal  Goal: Verbalizes criteria for restraint discontinuation  Description: Document on Patient Education Activity  Outcome: Outcome Met, Complete Goal     Problem: At Risk for Falls  Goal: # Patient does not fall  Outcome: Outcome Met, Continue evaluating goal progress toward completion  Goal: # Takes action to control fall-related risks  Outcome: Outcome Not Met, Continue to Monitor  Goal: # Verbalizes understanding of fall risk/precautions  Description: Document education using the patient education activity  Outcome: Outcome Not Met, Continue to Monitor     Problem: At Risk for Injury Due to Fall  Goal: # Patient does not fall  Outcome: Outcome Met, Continue evaluating goal progress toward completion  Goal: # Takes action to control condition specific risks  Outcome: Outcome Not Met, Continue to Monitor  Goal: # Verbalizes understanding of fall-related injury personal risks  Description: Document education using the patient education activity  Outcome: Outcome Not Met, Continue to Monitor      ----- Message from Rolly Bhakta MD sent at 2/29/2024  2:14 PM EST -----  Regarding: lymph nodes  Please find out from patient whether she still notices small lymph nodes to the back of her neck left side, if still present we can consider imaging study.

## 2024-05-13 ENCOUNTER — OFFICE VISIT (OUTPATIENT)
Dept: PRIMARY CARE CLINIC | Age: 49
End: 2024-05-13
Payer: COMMERCIAL

## 2024-05-13 VITALS
OXYGEN SATURATION: 97 % | TEMPERATURE: 97.1 F | HEART RATE: 78 BPM | DIASTOLIC BLOOD PRESSURE: 78 MMHG | RESPIRATION RATE: 14 BRPM | BODY MASS INDEX: 23.34 KG/M2 | WEIGHT: 154 LBS | HEIGHT: 68 IN | SYSTOLIC BLOOD PRESSURE: 128 MMHG

## 2024-05-13 DIAGNOSIS — L23.7 POISON IVY DERMATITIS: Primary | ICD-10-CM

## 2024-05-13 PROCEDURE — G8427 DOCREV CUR MEDS BY ELIG CLIN: HCPCS | Performed by: INTERNAL MEDICINE

## 2024-05-13 PROCEDURE — 99213 OFFICE O/P EST LOW 20 MIN: CPT | Performed by: INTERNAL MEDICINE

## 2024-05-13 PROCEDURE — 3078F DIAST BP <80 MM HG: CPT | Performed by: INTERNAL MEDICINE

## 2024-05-13 PROCEDURE — G8420 CALC BMI NORM PARAMETERS: HCPCS | Performed by: INTERNAL MEDICINE

## 2024-05-13 PROCEDURE — 3074F SYST BP LT 130 MM HG: CPT | Performed by: INTERNAL MEDICINE

## 2024-05-13 PROCEDURE — 1036F TOBACCO NON-USER: CPT | Performed by: INTERNAL MEDICINE

## 2024-05-13 RX ORDER — PREDNISONE 20 MG/1
TABLET ORAL
Qty: 15 TABLET | Refills: 0 | Status: SHIPPED | OUTPATIENT
Start: 2024-05-13

## 2024-05-13 ASSESSMENT — ENCOUNTER SYMPTOMS
SINUS PRESSURE: 0
EYE REDNESS: 0
TROUBLE SWALLOWING: 0
ABDOMINAL PAIN: 0
COUGH: 0
BLOOD IN STOOL: 0
COLOR CHANGE: 0
VOMITING: 0
WHEEZING: 0
NAUSEA: 0
SORE THROAT: 0
CHEST TIGHTNESS: 0
DIARRHEA: 0
EYE PAIN: 0
BACK PAIN: 0
ABDOMINAL DISTENTION: 0

## 2024-05-13 NOTE — ASSESSMENT & PLAN NOTE
will prescribe tapering prednisone regimen.  Offered hydroxyzine, she said she will get over-the-counter Benadryl.  Exercise caution taking Benadryl which is sedating.

## 2024-05-13 NOTE — PROGRESS NOTES
Erica Lepe is 48 y.o. female who p/w Poison Therese    Patient was working out in the yard yesterday he was clearing out brush against the fence line.  Today presented with extremely itchy rash face around eyes.  Now having rash and itching to arms.  Patient denies any cellulitic changes to skin      The problem and medicine lists and chart were reviewed in detail.      Review of Systems   Constitutional:  Negative for activity change, appetite change, chills, fatigue, fever and unexpected weight change.   HENT:  Negative for congestion, ear pain, postnasal drip, sinus pressure, sore throat, tinnitus and trouble swallowing.    Eyes:  Negative for pain and redness.   Respiratory:  Negative for cough, chest tightness, shortness of breath and wheezing.    Cardiovascular:  Negative for chest pain, palpitations and leg swelling.   Gastrointestinal:  Negative for abdominal distention, abdominal pain, blood in stool, constipation, diarrhea, nausea and vomiting.   Endocrine: Negative for cold intolerance, heat intolerance and polydipsia.   Genitourinary:  Negative for decreased urine volume, dysuria, flank pain, frequency, hematuria and urgency.   Musculoskeletal:  Negative for arthralgias, back pain, joint swelling, neck pain and neck stiffness.   Skin:  Negative for color change and rash.   Neurological:  Negative for dizziness, weakness, numbness and headaches.   Hematological:  Negative for adenopathy.   Psychiatric/Behavioral:  Negative for behavioral problems, sleep disturbance and suicidal ideas. The patient is not nervous/anxious.         /78 (Site: Left Upper Arm, Position: Sitting, Cuff Size: Medium Adult)   Pulse 78   Temp 97.1 °F (36.2 °C)   Resp 14   Ht 1.727 m (5' 8\")   Wt 69.9 kg (154 lb)   SpO2 97%   BMI 23.42 kg/m²    Physical Exam  Constitutional:       General: She is not in acute distress.     Appearance: Normal appearance. She is not ill-appearing.   HENT:      Head: Normocephalic and

## 2024-05-20 ENCOUNTER — TELEPHONE (OUTPATIENT)
Dept: PRIMARY CARE CLINIC | Age: 49
End: 2024-05-20

## 2024-05-20 NOTE — TELEPHONE ENCOUNTER
----- Message from Rolly Bhakta MD sent at 5/20/2024  2:28 PM EDT -----  Regarding: E chart visit switch to  office visit  Please let patient know we will need a urine sample before prescribing antibiotics, she could switch to office visit or nurse visit.

## 2024-05-21 ENCOUNTER — OFFICE VISIT (OUTPATIENT)
Dept: PRIMARY CARE CLINIC | Age: 49
End: 2024-05-21
Payer: COMMERCIAL

## 2024-05-21 VITALS
RESPIRATION RATE: 14 BRPM | TEMPERATURE: 97.1 F | HEIGHT: 68 IN | SYSTOLIC BLOOD PRESSURE: 128 MMHG | WEIGHT: 154 LBS | BODY MASS INDEX: 23.34 KG/M2 | OXYGEN SATURATION: 99 % | HEART RATE: 84 BPM | DIASTOLIC BLOOD PRESSURE: 82 MMHG

## 2024-05-21 DIAGNOSIS — R30.0 DYSURIA: ICD-10-CM

## 2024-05-21 DIAGNOSIS — N30.90 CYSTITIS: Primary | ICD-10-CM

## 2024-05-21 LAB
BILIRUBIN, POC: NORMAL
BLOOD URINE, POC: NORMAL
CLARITY, POC: CLEAR
COLOR, POC: YELLOW
GLUCOSE URINE, POC: NORMAL
KETONES, POC: NORMAL
LEUKOCYTE EST, POC: NORMAL
NITRITE, POC: NORMAL
PH, POC: 7
PROTEIN, POC: NORMAL
SPECIFIC GRAVITY, POC: 1.01
UROBILINOGEN, POC: 0.2

## 2024-05-21 PROCEDURE — 81002 URINALYSIS NONAUTO W/O SCOPE: CPT | Performed by: INTERNAL MEDICINE

## 2024-05-21 PROCEDURE — G8427 DOCREV CUR MEDS BY ELIG CLIN: HCPCS | Performed by: INTERNAL MEDICINE

## 2024-05-21 PROCEDURE — 1036F TOBACCO NON-USER: CPT | Performed by: INTERNAL MEDICINE

## 2024-05-21 PROCEDURE — 3017F COLORECTAL CA SCREEN DOC REV: CPT | Performed by: INTERNAL MEDICINE

## 2024-05-21 PROCEDURE — G8420 CALC BMI NORM PARAMETERS: HCPCS | Performed by: INTERNAL MEDICINE

## 2024-05-21 PROCEDURE — 3074F SYST BP LT 130 MM HG: CPT | Performed by: INTERNAL MEDICINE

## 2024-05-21 PROCEDURE — 3079F DIAST BP 80-89 MM HG: CPT | Performed by: INTERNAL MEDICINE

## 2024-05-21 PROCEDURE — 99213 OFFICE O/P EST LOW 20 MIN: CPT | Performed by: INTERNAL MEDICINE

## 2024-05-21 RX ORDER — SULFAMETHOXAZOLE AND TRIMETHOPRIM 800; 160 MG/1; MG/1
1 TABLET ORAL 2 TIMES DAILY
Qty: 10 TABLET | Refills: 0 | Status: SHIPPED | OUTPATIENT
Start: 2024-05-21 | End: 2024-05-26

## 2024-05-21 ASSESSMENT — ENCOUNTER SYMPTOMS
EYE PAIN: 0
ABDOMINAL DISTENTION: 0
SORE THROAT: 0
BLOOD IN STOOL: 0
SINUS PRESSURE: 0
CONSTIPATION: 0
DIARRHEA: 0
TROUBLE SWALLOWING: 0
NAUSEA: 0
WHEEZING: 0
ABDOMINAL PAIN: 0
SHORTNESS OF BREATH: 0
EYE REDNESS: 0
CHEST TIGHTNESS: 0
BACK PAIN: 0
COLOR CHANGE: 0
COUGH: 0
VOMITING: 0

## 2024-05-21 NOTE — PROGRESS NOTES
Dysuria  -     POCT Urinalysis no Micro      Return in about 3 months (around 8/29/2024) for Routine follow-up, 15-minutes .     This note was generated in part or in whole with voice recognition software.  Voice recognition is usually quite accurate but there are transcription errors that can often occur.  All attempts were made to correct these errors I apologized for any  typographical errors that were not detected and corrected.     Electronically signed by Rolly Bhakta MD on 5/21/2024 at 3:15 PM.

## 2024-05-23 LAB
BACTERIA UR CULT: ABNORMAL
ORGANISM: ABNORMAL

## 2024-05-24 ENCOUNTER — TELEPHONE (OUTPATIENT)
Dept: PRIMARY CARE CLINIC | Age: 49
End: 2024-05-24

## 2024-05-24 DIAGNOSIS — N30.90 CYSTITIS: Primary | ICD-10-CM

## 2024-05-24 RX ORDER — NITROFURANTOIN 25; 75 MG/1; MG/1
100 CAPSULE ORAL 2 TIMES DAILY
Qty: 10 CAPSULE | Refills: 0 | Status: SHIPPED | OUTPATIENT
Start: 2024-05-24 | End: 2024-05-29

## 2024-05-24 NOTE — TELEPHONE ENCOUNTER
Erica called to say that the Bactrim that Dr. Bhakta prescribed for her UTI is making her nauseous and she stopped taking it last night and today. She wanted to know if there was something else he could prescribe. Please call to advise.

## 2024-07-08 ENCOUNTER — PATIENT MESSAGE (OUTPATIENT)
Dept: PRIMARY CARE CLINIC | Age: 49
End: 2024-07-08

## 2024-07-08 DIAGNOSIS — M79.7 FIBROMYALGIA: ICD-10-CM

## 2024-07-09 RX ORDER — TIZANIDINE HYDROCHLORIDE 4 MG/1
4 CAPSULE, GELATIN COATED ORAL 3 TIMES DAILY PRN
Qty: 90 CAPSULE | Refills: 5 | Status: SHIPPED | OUTPATIENT
Start: 2024-07-09

## 2024-07-09 NOTE — TELEPHONE ENCOUNTER
From: Erica Lepe  To: Dr. Rolly Bhakta  Sent: 7/8/2024 6:35 PM EDT  Subject: Fibromyalgia flare up    I tried to refill my Tizanidine hcl 4mg today. Per the script it says I have 2 available till sep/24. Pharmacy states I cannot refill without M.D. So I'm reaching out to request a refill to be sent in.

## 2024-07-09 NOTE — TELEPHONE ENCOUNTER
Medication:   Requested Prescriptions     Pending Prescriptions Disp Refills    tiZANidine (ZANAFLEX) 4 MG capsule 90 capsule 2     Sig: Take 1 capsule by mouth 3 times daily as needed for Muscle spasms      Last Filled:  9/28/23    Patient Phone Number: [phone] (home)     Last appt: 5/21/2024   Next appt: 8/29/2024    Last OARRS:       11/19/2021     2:29 PM   RX Monitoring   Periodic Controlled Substance Monitoring Possible medication side effects, risk of tolerance/dependence & alternative treatments discussed.;No signs of potential drug abuse or diversion identified.     PDMP Monitoring:    Last PDMP [place] as Reviewed ([place]):  Review User Review Instant Review Result   CHIDI LOPEZ 8/4/2022  1:47 PM Reviewed PDMP [1]     Preferred Pharmacy:   [facility] [id number] [place] [facility] - [place], [place] - [address] [phone] -  [phone]  [address] [address]  Phone: [phone] Fax: [phone]    Recent Visits  Date Type Provider Dept   05/21/24 Office Visit Rolly Bhakta MD [facility]   05/13/24 Office Visit Rolly Bhakta MD [facility]   02/29/24 Office Visit Rolly Bhakta MD [facility]   08/29/23 Office Visit Rolly Bhakta MD [facility]   08/08/23 Office Visit Rolly Bhakta MD [facility]   02/22/23 Office Visit Rolly Bhakta MD [facility]   Showing recent visits within past 540 days with a meds authorizing provider and meeting all other requirements  Future Appointments  Date Type Provider Dept   08/29/24 Appointment Rolly Bhakta MD [facility]   Showing future appointments within next 150 days with a meds authorizing provider and meeting all other requirements     5/21/2024

## 2024-07-11 ENCOUNTER — TELEPHONE (OUTPATIENT)
Dept: PRIMARY CARE CLINIC | Age: 49
End: 2024-07-11

## 2024-07-11 NOTE — TELEPHONE ENCOUNTER
Patient calling in for an appt. Unfortunately there is nothing left for today. Patient states she believes to have Bronchitis. Advised patient to go to Urgent Care or ER whichever she felt was needed. Gave her [facility] address on [address]. She wanted me to let Dr. Bhakta know of the situation.

## 2024-07-29 DIAGNOSIS — M79.2 NEUROPATHIC PAIN: ICD-10-CM

## 2024-07-29 DIAGNOSIS — F41.1 GENERALIZED ANXIETY DISORDER: ICD-10-CM

## 2024-07-29 DIAGNOSIS — E78.2 MIXED HYPERLIPIDEMIA: ICD-10-CM

## 2024-07-29 RX ORDER — PRAVASTATIN SODIUM 10 MG
10 TABLET ORAL
Qty: 30 TABLET | Refills: 5 | Status: SHIPPED | OUTPATIENT
Start: 2024-07-29

## 2024-07-29 RX ORDER — DULOXETIN HYDROCHLORIDE 60 MG/1
CAPSULE, DELAYED RELEASE ORAL
Qty: 30 CAPSULE | Refills: 5 | Status: SHIPPED | OUTPATIENT
Start: 2024-07-29

## 2024-08-29 ENCOUNTER — OFFICE VISIT (OUTPATIENT)
Dept: PRIMARY CARE CLINIC | Age: 49
End: 2024-08-29
Payer: COMMERCIAL

## 2024-08-29 VITALS
TEMPERATURE: 97.5 F | HEIGHT: 68 IN | RESPIRATION RATE: 14 BRPM | HEART RATE: 87 BPM | SYSTOLIC BLOOD PRESSURE: 112 MMHG | BODY MASS INDEX: 24.07 KG/M2 | DIASTOLIC BLOOD PRESSURE: 66 MMHG | OXYGEN SATURATION: 98 % | WEIGHT: 158.8 LBS

## 2024-08-29 DIAGNOSIS — E78.2 MIXED HYPERLIPIDEMIA: ICD-10-CM

## 2024-08-29 DIAGNOSIS — Z12.31 ENCOUNTER FOR SCREENING MAMMOGRAM FOR MALIGNANT NEOPLASM OF BREAST: ICD-10-CM

## 2024-08-29 DIAGNOSIS — I10 BENIGN ESSENTIAL HTN: Primary | ICD-10-CM

## 2024-08-29 DIAGNOSIS — M79.7 FIBROMYALGIA: ICD-10-CM

## 2024-08-29 DIAGNOSIS — R53.83 OTHER FATIGUE: ICD-10-CM

## 2024-08-29 DIAGNOSIS — R79.89 LOW VITAMIN B12 LEVEL: ICD-10-CM

## 2024-08-29 DIAGNOSIS — E83.110 HEREDITARY HEMOCHROMATOSIS (HCC): ICD-10-CM

## 2024-08-29 PROCEDURE — 3017F COLORECTAL CA SCREEN DOC REV: CPT | Performed by: INTERNAL MEDICINE

## 2024-08-29 PROCEDURE — 4004F PT TOBACCO SCREEN RCVD TLK: CPT | Performed by: INTERNAL MEDICINE

## 2024-08-29 PROCEDURE — 3078F DIAST BP <80 MM HG: CPT | Performed by: INTERNAL MEDICINE

## 2024-08-29 PROCEDURE — 99214 OFFICE O/P EST MOD 30 MIN: CPT | Performed by: INTERNAL MEDICINE

## 2024-08-29 PROCEDURE — 3074F SYST BP LT 130 MM HG: CPT | Performed by: INTERNAL MEDICINE

## 2024-08-29 PROCEDURE — G8420 CALC BMI NORM PARAMETERS: HCPCS | Performed by: INTERNAL MEDICINE

## 2024-08-29 PROCEDURE — G8427 DOCREV CUR MEDS BY ELIG CLIN: HCPCS | Performed by: INTERNAL MEDICINE

## 2024-08-29 ASSESSMENT — ENCOUNTER SYMPTOMS
SORE THROAT: 0
CHEST TIGHTNESS: 0
SHORTNESS OF BREATH: 0
DIARRHEA: 0
EYE PAIN: 0
COUGH: 0
BLOOD IN STOOL: 0
NAUSEA: 0
SINUS PRESSURE: 0
ABDOMINAL PAIN: 0
VOMITING: 0
ABDOMINAL DISTENTION: 0
COLOR CHANGE: 0
CONSTIPATION: 0
EYE REDNESS: 0
WHEEZING: 0
TROUBLE SWALLOWING: 0
BACK PAIN: 0

## 2024-08-29 NOTE — PROGRESS NOTES
Erica Lepe (1975) is a 49 y.o. female   6 Month Follow-Up     General health:  This patient presents for check up and refills. The problem and medicine lists and chart were reviewed in detail. The patient has been worked up and treated for this/these condition/s and is compliant with taking the medication without any significant side effects. The patient's condition/s is/are chronic and unchanged and otherwise remains stable. Feels well with minor complaints.    Main Problem Review - HTN  - Patient blood pressure is stable today. Patient denies complaints or symptoms today. Patient mentions she is adherent with treatment. Patient denies chest pain or shortness of breath with exertion or at rest. Adherent to low salt diet.     Other problems review    1.1.  HLD - Patient denies complaints or symptoms of chest pain/shortness of breath with exertion or at rest.  Patient mentions they are adherent with treatment.  Patient follows a low-fat diet, tries to exercise.      2. Fibromyalgia -patient had a negative EMG.  Patient says duloxetine has helped considerably.  She has been practicing yoga which she believes also helps.    3.  Hereditary hemochromatosis - Patient denies complaints or symptoms today.  Patient mentions she has upcoming visit with hematologist in 12/2022.     Health Maintenance    Last Papsmear-s/p  Total hysterectomy  ([age] y/o)  Annual retinal eye exam - Patient due  will need to schedule   Annual Dentist visit - 3/2022  Tobacco smoking - NO  Alcohol Misuse - NO  Illicit Drug Use- NO  Healthy Diet and physical activity -  YES  Obesity Screen- screened 8/15/2022  Wears seat belt-  YES  End of life directives discussed with patient.-Mentions she does not have  a will/or/an advanced directives. Was instructed to start process looking into preparing her will an advanced directives.   The 10-year ASCVD risk score (Tamara OLMEDO, et al., 2019) is: 0.8%    Values used to calculate the score:      Age: 49  pravastatin and tolerating it  Will obtain blood work next week  Orders:  -     Lipid Panel; Future  3. Hereditary hemochromatosis (HCC)  Assessment & Plan:   Patient follows with hematology.  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  Patient used to receive therapeutic phlebotomy, patient says she is at maintenance phase with  Ferritin within normal limits  Sees hematologist   Follow-up with specialists as per their orders.    Orders:  -     Ferritin; Future  -     Iron and TIBC; Future  4. Fibromyalgia  Assessment & Plan:   diagnosis of fibromyalgia  On appropriate medication  Counseled on lifestyle changes and aerobic exercise contributing to 70% of the treatment  Informed that study show raudel chi may be better than aerobic exercise  Patient said duloxetine 60 mg daily has helped considerably  Discussed adding low-dose TCA at bedtime patient declined      Orders:  -     Vitamin B12; Future  5. Other fatigue  -     Vitamin B12; Future  6. Low vitamin B12 level  -     Vitamin B12; Future  7. Encounter for screening mammogram for malignant neoplasm of breast  -     [facility] DIGITAL SCREEN BILATERAL; Future      Preventative care discussed.  Encouraged healthy diet choices, reg exercise. Patient agreed w/plan and verbal understanding. Patient advised to call or return with any concerns or problems or worsening conditions. Go to the ER for any severe or potentially life threatening problems.    Return in about 6 months (around 2/28/2025) for Annual physical, 15 minutes .     This note was generated in part or in whole with voice recognition software.  Voice recognition is usually quite accurate but there are transcription errors that can often occur.  All attempts were made to correct these errors I apologized for any  typographical errors that were not detected and corrected.     Electronically signed by Rolly Bhakta MD on 8/29/2024 at 3:13 PM.

## 2024-08-29 NOTE — ASSESSMENT & PLAN NOTE
Patient follows with hematology.  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  Patient used to receive therapeutic phlebotomy, patient says she is at maintenance phase with  Ferritin within normal limits  Sees hematologist   Follow-up with specialists as per their orders.

## 2024-08-30 DIAGNOSIS — E83.110 HEREDITARY HEMOCHROMATOSIS (HCC): ICD-10-CM

## 2024-08-30 DIAGNOSIS — E78.2 MIXED HYPERLIPIDEMIA: ICD-10-CM

## 2024-08-30 DIAGNOSIS — R53.83 OTHER FATIGUE: ICD-10-CM

## 2024-08-30 DIAGNOSIS — R79.89 LOW VITAMIN B12 LEVEL: ICD-10-CM

## 2024-08-30 DIAGNOSIS — M79.7 FIBROMYALGIA: ICD-10-CM

## 2024-08-30 LAB
CHOLEST SERPL-MCNC: 233 MG/DL (ref 0–199)
FERRITIN SERPL IA-MCNC: 64.5 NG/ML (ref 15–150)
HDLC SERPL-MCNC: 78 MG/DL (ref 40–60)
IRON SATN MFR SERPL: 29 % (ref 15–50)
IRON SERPL-MCNC: 95 UG/DL (ref 37–145)
LDLC SERPL CALC-MCNC: 133 MG/DL
TIBC SERPL-MCNC: 331 UG/DL (ref 260–445)
TRIGL SERPL-MCNC: 112 MG/DL (ref 0–150)
VIT B12 SERPL-MCNC: 275 PG/ML (ref 211–911)
VLDLC SERPL CALC-MCNC: 22 MG/DL

## 2024-09-03 NOTE — RESULT ENCOUNTER NOTE
Please let patient know iron and ferritin levels are within normal limits.  Vitamin B12 is low normal to 275.    LDL is 133 should be under 100, HDL 78, LDL is improved from 94 should be under 70.  She could treat this with a low fat diet and exercise.

## 2024-09-09 ENCOUNTER — HOSPITAL ENCOUNTER (OUTPATIENT)
Dept: WOMENS IMAGING | Age: 49
Discharge: HOME | End: 2024-09-09
Payer: COMMERCIAL

## 2024-09-09 VITALS — WEIGHT: 152 LBS | HEIGHT: 68 IN | BODY MASS INDEX: 23.04 KG/M2

## 2024-09-09 DIAGNOSIS — Z12.31 ENCOUNTER FOR SCREENING MAMMOGRAM FOR MALIGNANT NEOPLASM OF BREAST: ICD-10-CM

## 2024-09-09 PROCEDURE — 77063 BREAST TOMOSYNTHESIS BI: CPT

## 2025-01-29 NOTE — ASSESSMENT & PLAN NOTE
Blood pressure elevated 156/96  Patient says at home her blood pressure is normally in the 140s/90s in the morning, 120s/70s in the  Patient says her blood pressure is anxiety driven as she has whitecoat hypertension.   She says soon as anxiety improves she anticipates morning blood pressures to be better  On  losartan 50 mg daily  To verify blood pressure cuff accuracy  To keep outpatient BP log,  counseled on exercise and diet (including DASH diet)  Goal to achieve appropriate BMI  Patient agreed with plan with verbal understanding
Elevate LDL & HDL  Patient was started on atorvastatin by prior PCP, patient said on taking atorvastatin made  her cervical axillary lymph nodes  feel tender without enlargement.   Patient willing to try pravastatin
We will start low-dose duloxetine 30 mg evening
Will get EMG.   Patient agreeable to starting low-dose duloxetine which could help with anxiety and nerve pain
No

## 2025-02-02 DIAGNOSIS — F41.1 GENERALIZED ANXIETY DISORDER: ICD-10-CM

## 2025-02-02 DIAGNOSIS — M79.2 NEUROPATHIC PAIN: ICD-10-CM

## 2025-02-02 DIAGNOSIS — E78.2 MIXED HYPERLIPIDEMIA: ICD-10-CM

## 2025-02-03 ENCOUNTER — PATIENT MESSAGE (OUTPATIENT)
Dept: PRIMARY CARE CLINIC | Age: 50
End: 2025-02-03

## 2025-02-03 ENCOUNTER — OFFICE VISIT (OUTPATIENT)
Age: 50
End: 2025-02-03

## 2025-02-03 VITALS
HEIGHT: 68 IN | OXYGEN SATURATION: 98 % | BODY MASS INDEX: 22.73 KG/M2 | WEIGHT: 150 LBS | DIASTOLIC BLOOD PRESSURE: 78 MMHG | HEART RATE: 102 BPM | SYSTOLIC BLOOD PRESSURE: 128 MMHG | TEMPERATURE: 98.7 F

## 2025-02-03 DIAGNOSIS — J01.10 ACUTE NON-RECURRENT FRONTAL SINUSITIS: Primary | ICD-10-CM

## 2025-02-03 DIAGNOSIS — R05.1 ACUTE COUGH: ICD-10-CM

## 2025-02-03 DIAGNOSIS — N95.1 PERIMENOPAUSAL: ICD-10-CM

## 2025-02-03 RX ORDER — BENZONATATE 100 MG/1
100 CAPSULE ORAL 3 TIMES DAILY PRN
Qty: 21 CAPSULE | Refills: 0 | Status: SHIPPED | OUTPATIENT
Start: 2025-02-03 | End: 2025-02-10

## 2025-02-03 RX ORDER — DULOXETIN HYDROCHLORIDE 60 MG/1
CAPSULE, DELAYED RELEASE ORAL
Qty: 30 CAPSULE | Refills: 5 | Status: SHIPPED | OUTPATIENT
Start: 2025-02-03

## 2025-02-03 RX ORDER — PRAVASTATIN SODIUM 10 MG
10 TABLET ORAL
Qty: 30 TABLET | Refills: 5 | Status: SHIPPED | OUTPATIENT
Start: 2025-02-03

## 2025-02-03 RX ORDER — ESTRADIOL 1 MG/1
1 TABLET ORAL DAILY
Qty: 90 TABLET | Refills: 3 | Status: SHIPPED | OUTPATIENT
Start: 2025-02-03

## 2025-02-03 ASSESSMENT — ENCOUNTER SYMPTOMS
EYE PAIN: 1
FACIAL SWELLING: 1
NAUSEA: 0
RHINORRHEA: 1
EYE REDNESS: 0
SINUS PRESSURE: 1
TROUBLE SWALLOWING: 0
SINUS PAIN: 1
SHORTNESS OF BREATH: 0
SORE THROAT: 1
DIARRHEA: 0
COUGH: 1

## 2025-02-03 NOTE — PROGRESS NOTES
Erica Lepe (:  1975) is a 49 y.o. female,New patient, here for evaluation of the following chief complaint(s):  Cold Symptoms (Pt states diagnose flu last week and now sinus is all infected and bothering. )      ASSESSMENT/PLAN:    ICD-10-CM    1. Acute non-recurrent frontal sinusitis  J01.10       2. Acute cough  R05.1 benzonatate (TESSALON) 100 MG capsule        Pt has severe maxillary sinus and eye pressure with headaches, purulent nasal discharge, pnd purulent, and cough that has developed over the last 10 days. Treated for maxillary sinusitis with Augmentin 875 mg BID x 7 days. Tessalon Rx sent for cough.       Follow up in 7 days if symptoms persist or if symptoms worsen.    SUBJECTIVE/OBJECTIVE:  49 year old female presents to clinic with c/o severe maxillary tenderness, purulent nasal drainage, headache, erythematous sore throat, pnd x 10 days.  She reports she was diagnosed with the flu last week reporting symptoms seemed to improve and rapidly worsened over the last three days.       Cold Symptoms   Associated symptoms include congestion, coughing, rhinorrhea, sinus pain and a sore throat. Pertinent negatives include no diarrhea, ear pain or nausea.           Vitals:    25 1521 25 1539   BP: (!) 142/98 128/78   Site: Left Upper Arm Left Upper Arm   Position: Sitting Sitting   Cuff Size: Medium Adult    Pulse: (!) 102    Temp: 98.7 °F (37.1 °C)    TempSrc: Oral    SpO2: 98%    Weight: 68 kg (150 lb)    Height: 1.727 m (5' 8\")        Review of Systems   Constitutional:  Negative for fever.   HENT:  Positive for congestion, facial swelling, postnasal drip, rhinorrhea, sinus pressure, sinus pain and sore throat. Negative for ear discharge, ear pain and trouble swallowing.    Eyes:  Positive for pain. Negative for redness and visual disturbance.   Respiratory:  Positive for cough. Negative for shortness of breath.    Gastrointestinal:  Negative for diarrhea and nausea.   Neurological:

## 2025-03-03 ENCOUNTER — OFFICE VISIT (OUTPATIENT)
Dept: PRIMARY CARE CLINIC | Age: 50
End: 2025-03-03
Payer: COMMERCIAL

## 2025-03-03 VITALS
HEART RATE: 82 BPM | DIASTOLIC BLOOD PRESSURE: 90 MMHG | BODY MASS INDEX: 24.55 KG/M2 | TEMPERATURE: 98 F | RESPIRATION RATE: 16 BRPM | HEIGHT: 68 IN | SYSTOLIC BLOOD PRESSURE: 138 MMHG | WEIGHT: 162 LBS | OXYGEN SATURATION: 99 %

## 2025-03-03 DIAGNOSIS — R73.09 IMPAIRED GLUCOSE REGULATION: ICD-10-CM

## 2025-03-03 DIAGNOSIS — Z00.00 ENCOUNTER FOR WELL ADULT EXAM WITHOUT ABNORMAL FINDINGS: Primary | ICD-10-CM

## 2025-03-03 DIAGNOSIS — M79.7 FIBROMYALGIA: ICD-10-CM

## 2025-03-03 DIAGNOSIS — R53.83 OTHER FATIGUE: ICD-10-CM

## 2025-03-03 DIAGNOSIS — E55.9 VITAMIN D DEFICIENCY: ICD-10-CM

## 2025-03-03 DIAGNOSIS — E83.110 HEREDITARY HEMOCHROMATOSIS: ICD-10-CM

## 2025-03-03 DIAGNOSIS — M79.2 NEUROPATHIC PAIN: ICD-10-CM

## 2025-03-03 DIAGNOSIS — E78.2 MIXED HYPERLIPIDEMIA: ICD-10-CM

## 2025-03-03 PROCEDURE — 3075F SYST BP GE 130 - 139MM HG: CPT | Performed by: INTERNAL MEDICINE

## 2025-03-03 PROCEDURE — 3080F DIAST BP >= 90 MM HG: CPT | Performed by: INTERNAL MEDICINE

## 2025-03-03 PROCEDURE — 99396 PREV VISIT EST AGE 40-64: CPT | Performed by: INTERNAL MEDICINE

## 2025-03-03 RX ORDER — AMITRIPTYLINE HYDROCHLORIDE 10 MG/1
10 TABLET ORAL NIGHTLY
Qty: 30 TABLET | Refills: 5 | Status: SHIPPED | OUTPATIENT
Start: 2025-03-03

## 2025-03-03 SDOH — ECONOMIC STABILITY: FOOD INSECURITY: WITHIN THE PAST 12 MONTHS, YOU WORRIED THAT YOUR FOOD WOULD RUN OUT BEFORE YOU GOT MONEY TO BUY MORE.: NEVER TRUE

## 2025-03-03 SDOH — ECONOMIC STABILITY: FOOD INSECURITY: WITHIN THE PAST 12 MONTHS, THE FOOD YOU BOUGHT JUST DIDN'T LAST AND YOU DIDN'T HAVE MONEY TO GET MORE.: NEVER TRUE

## 2025-03-03 ASSESSMENT — PATIENT HEALTH QUESTIONNAIRE - PHQ9
SUM OF ALL RESPONSES TO PHQ QUESTIONS 1-9: 0
2. FEELING DOWN, DEPRESSED OR HOPELESS: NOT AT ALL
1. LITTLE INTEREST OR PLEASURE IN DOING THINGS: NOT AT ALL
SUM OF ALL RESPONSES TO PHQ QUESTIONS 1-9: 0

## 2025-03-03 ASSESSMENT — ENCOUNTER SYMPTOMS
WHEEZING: 0
DIARRHEA: 0
TROUBLE SWALLOWING: 0
SORE THROAT: 0
ABDOMINAL PAIN: 0
CHEST TIGHTNESS: 0
ABDOMINAL DISTENTION: 0
EYE REDNESS: 0
NAUSEA: 0
COLOR CHANGE: 0
SHORTNESS OF BREATH: 0
SINUS PRESSURE: 0
COUGH: 0
VOMITING: 0
EYE PAIN: 0
BACK PAIN: 0
CONSTIPATION: 0
BLOOD IN STOOL: 0

## 2025-03-03 NOTE — PATIENT INSTRUCTIONS
gait, strength, vision, hearing, and cognition make even the most youthful senior more prone to accidents. Falls are one of the leading health risks for older people. This increased risk of falling is related to:   Aging process (eg, decreased muscle strength, slowed reflexes)   Higher incidence of chronic health problems (eg, arthritis, diabetes) that may limit mobility, agility or sensory awareness   Side effects of medicine (eg, dizziness, blurred vision)especially medicines like prescription pain medicines and drugs used to treat mental health conditions   Depending on the brittleness of your bones, the consequences of a fall can be serious and long lasting.   Home Life   Research by the Association of Aging (AOA) shows that some home accidents among older adults can be prevented by making simple lifestyle changes and basic modifications and repairs to the home environment. Here are some lifestyle changes that experts recommend:   Have your hearing and vision checked regularly. Be sure to wear prescription glasses that are right for you.   Speak to your doctor or pharmacist about the possible side effects of your medicines. A number of medicines can cause dizziness.   If you have problems with sleep, talk to your doctor.   Limit your intake of alcohol.   If necessary, use a cane or walker to help maintain your balance.   Wear supportive, rubber-soled shoes, even at home. If you live in a region that gets wintry weather, you may want to put special cleats on your shoes to prevent you from slipping on the snow and ice.   Exercise regularly to help maintain muscle tone, agility, and balance.   Always hold the banister when going up or down stairs. Also, use  bars when getting in or out of the bath or shower, or using the toilet.   To avoid dizziness, get up slowly from a lying down position. Sit up first, dangling your legs for a minute or two before rising to a standing position.   Overall Home Safety Check

## 2025-03-03 NOTE — ASSESSMENT & PLAN NOTE
diagnosis of fibromyalgia  On appropriate medication  Counseled on lifestyle changes and aerobic exercise contributing to 70% of the treatment  Informed that study show raudel chi may be better than aerobic exercise  Patient said duloxetine 60 mg daily has helped considerably  Will add night time amitriptyline    Patient informed that taking 2 antidepressants may increase the incidence of serotonin syndrome.  This may lead to symptoms including but not limited to  hyperthermia, tremors, palpitations elevated blood pressure, agitation/restlessness, muscle rigidity, confusion.  If patient were to have any of the symptoms they're  to call us let us know, if severe symptoms go to the nearest ED.

## 2025-03-03 NOTE — ASSESSMENT & PLAN NOTE
Patient on duloxetine which could help with anxiety and nerve pain  Will add night time amitriptyline     Patient informed that taking 2 antidepressants may increase the incidence of serotonin syndrome.  This may lead to symptoms including but not limited to  hyperthermia, tremors, palpitations elevated blood pressure, agitation/restlessness, muscle rigidity, confusion.  If patient were to have any of the symptoms they're  to call us let us know, if severe symptoms go to the nearest ED.

## 2025-03-03 NOTE — PROGRESS NOTES
Cervical back: No tenderness.   Lymphadenopathy:      Cervical: No cervical adenopathy.   Skin:     Findings: No erythema, lesion or rash.   Neurological:      General: No focal deficit present.      Mental Status: She is alert and oriented to person, place, and time. Mental status is at baseline.      Cranial Nerves: No cranial nerve deficit.      Motor: No weakness.   Psychiatric:         Mood and Affect: Mood normal.         Behavior: Behavior normal.         Thought Content: Thought content normal.         Judgment: Judgment normal.             Personalized Preventive Plan  Current Health Maintenance Status  Immunization History   Administered Date(s) Administered    INFLUENZA, INTRADERMAL, QUADRIVALENT, PRESERVATIVE FREE 11/23/2016, 11/27/2017    Influenza Virus Vaccine 10/25/2010    Influenza, FLUARIX, FLULAVAL, FLUZONE (age 6 mo+) and AFLURIA, (age 3 y+), Quadv PF, 0.5mL 12/16/2020    Influenza, FLUCELVAX, (age 6 mo+), MDCK, Quadv PF, 0.5mL 10/27/2022    Influenza, Intradermal, Preservative free 01/10/2013, 11/05/2013, 11/03/2015    PPD Test 05/26/2009, 06/25/2018    TDaP, ADACEL (age 10y-64y), BOOSTRIX (age 10y+), IM, 0.5mL 09/08/2014        Health Maintenance Due   Topic Date Due    Hepatitis B vaccine (1 of 3 - 19+ 3-dose series) Never done    Shingles vaccine (1 of 2) Never done    Pneumococcal 50+ years Vaccine (1 of 1 - PCV) Never done    COVID-19 Vaccine (1 - 2024-25 season) Never done    DTaP/Tdap/Td vaccine (2 - Td or Tdap) 09/08/2024      Recommendations for Preventive Services Due: see orders and patient instructions/AVS.

## 2025-03-08 DIAGNOSIS — R53.83 OTHER FATIGUE: ICD-10-CM

## 2025-03-08 DIAGNOSIS — E55.9 VITAMIN D DEFICIENCY: ICD-10-CM

## 2025-03-08 DIAGNOSIS — Z00.00 ENCOUNTER FOR WELL ADULT EXAM WITHOUT ABNORMAL FINDINGS: ICD-10-CM

## 2025-03-08 DIAGNOSIS — R73.09 IMPAIRED GLUCOSE REGULATION: ICD-10-CM

## 2025-03-08 DIAGNOSIS — E83.110 HEREDITARY HEMOCHROMATOSIS: ICD-10-CM

## 2025-03-08 DIAGNOSIS — E78.2 MIXED HYPERLIPIDEMIA: ICD-10-CM

## 2025-03-08 LAB
25(OH)D3 SERPL-MCNC: 36.1 NG/ML
ALBUMIN SERPL-MCNC: 4.7 G/DL (ref 3.4–5)
ALBUMIN/GLOB SERPL: 1.8 {RATIO} (ref 1.1–2.2)
ALP SERPL-CCNC: 90 U/L (ref 40–129)
ALT SERPL-CCNC: 37 U/L (ref 10–40)
ANION GAP SERPL CALCULATED.3IONS-SCNC: 13 MMOL/L (ref 3–16)
AST SERPL-CCNC: 32 U/L (ref 15–37)
BASOPHILS # BLD: 0 K/UL (ref 0–0.2)
BASOPHILS NFR BLD: 0.6 %
BILIRUB SERPL-MCNC: 0.4 MG/DL (ref 0–1)
BUN SERPL-MCNC: 10 MG/DL (ref 7–20)
CALCIUM SERPL-MCNC: 9.6 MG/DL (ref 8.3–10.6)
CHLORIDE SERPL-SCNC: 104 MMOL/L (ref 99–110)
CHOLEST SERPL-MCNC: 247 MG/DL (ref 0–199)
CO2 SERPL-SCNC: 26 MMOL/L (ref 21–32)
CREAT SERPL-MCNC: 0.8 MG/DL (ref 0.6–1.1)
DEPRECATED RDW RBC AUTO: 13.5 % (ref 12.4–15.4)
EOSINOPHIL # BLD: 0.2 K/UL (ref 0–0.6)
EOSINOPHIL NFR BLD: 2.9 %
FERRITIN SERPL IA-MCNC: 56.1 NG/ML (ref 15–150)
GFR SERPLBLD CREATININE-BSD FMLA CKD-EPI: 89 ML/MIN/{1.73_M2}
GLUCOSE SERPL-MCNC: 88 MG/DL (ref 70–99)
HCT VFR BLD AUTO: 44.7 % (ref 36–48)
HDLC SERPL-MCNC: 71 MG/DL (ref 40–60)
HGB BLD-MCNC: 15.3 G/DL (ref 12–16)
IRON SATN MFR SERPL: 32 % (ref 15–50)
IRON SERPL-MCNC: 119 UG/DL (ref 37–145)
LDLC SERPL CALC-MCNC: 135 MG/DL
LYMPHOCYTES # BLD: 1.8 K/UL (ref 1–5.1)
LYMPHOCYTES NFR BLD: 23.8 %
MCH RBC QN AUTO: 34.7 PG (ref 26–34)
MCHC RBC AUTO-ENTMCNC: 34.1 G/DL (ref 31–36)
MCV RBC AUTO: 101.5 FL (ref 80–100)
MONOCYTES # BLD: 0.5 K/UL (ref 0–1.3)
MONOCYTES NFR BLD: 6.5 %
NEUTROPHILS # BLD: 4.9 K/UL (ref 1.7–7.7)
NEUTROPHILS NFR BLD: 66.2 %
PLATELET # BLD AUTO: 301 K/UL (ref 135–450)
PMV BLD AUTO: 8.5 FL (ref 5–10.5)
POTASSIUM SERPL-SCNC: 4.1 MMOL/L (ref 3.5–5.1)
PROT SERPL-MCNC: 7.3 G/DL (ref 6.4–8.2)
RBC # BLD AUTO: 4.4 M/UL (ref 4–5.2)
SODIUM SERPL-SCNC: 143 MMOL/L (ref 136–145)
TIBC SERPL-MCNC: 376 UG/DL (ref 260–445)
TRIGL SERPL-MCNC: 203 MG/DL (ref 0–150)
TSH SERPL DL<=0.005 MIU/L-ACNC: 2.47 UIU/ML (ref 0.27–4.2)
VIT B12 SERPL-MCNC: 295 PG/ML (ref 211–911)
VLDLC SERPL CALC-MCNC: 41 MG/DL
WBC # BLD AUTO: 7.4 K/UL (ref 4–11)

## 2025-03-09 LAB
EST. AVERAGE GLUCOSE BLD GHB EST-MCNC: 108.3 MG/DL
HBA1C MFR BLD: 5.4 %

## 2025-03-10 ENCOUNTER — RESULTS FOLLOW-UP (OUTPATIENT)
Dept: PRIMARY CARE CLINIC | Age: 50
End: 2025-03-10

## 2025-03-10 NOTE — RESULT ENCOUNTER NOTE
Please let patient know iron and ferritin are within normal limits.    LDL bad cholesterol 135 from 133.  Could treat with low-fat diet.  Triglycerides are 203 this could be treated easily with over-the-counter omega-3/fish oil.      Vitamin B12 was 295 and she could supplement with vitamin B12 to increase this number.  Remaining results stable.

## 2025-03-12 NOTE — RESULT ENCOUNTER NOTE
Please find out how many micrograms of milligrams of B12 she is taking.  We may have to increase the dose.  If she is on a high oral dose that means she is not absorbing the B12 and we may have to switch to shots.

## 2025-03-18 ENCOUNTER — TELEPHONE (OUTPATIENT)
Dept: PRIMARY CARE CLINIC | Age: 50
End: 2025-03-18

## 2025-03-18 DIAGNOSIS — I10 BENIGN ESSENTIAL HTN: ICD-10-CM

## 2025-03-18 RX ORDER — LOSARTAN POTASSIUM 100 MG/1
100 TABLET ORAL DAILY
Qty: 90 TABLET | Refills: 3 | Status: SHIPPED | OUTPATIENT
Start: 2025-03-18

## 2025-03-18 NOTE — TELEPHONE ENCOUNTER
----- Message from Dr. Rolly Bhakta MD sent at 3/3/2025  2:13 PM EST -----  Regarding: fibromyalgia  Please call patient to ask about their  Fibromyalgia  and find out how they are doing since starting bedtime amitriptyline.

## 2025-03-18 NOTE — TELEPHONE ENCOUNTER
Patient is requesting a refill on the below medication:    losartan (COZAAR) 100 MG tablet [[id number]]      Thank you.

## 2025-03-25 DIAGNOSIS — R79.89 LOW VITAMIN B12 LEVEL: Primary | ICD-10-CM

## 2025-03-25 RX ORDER — CYANOCOBALAMIN 1000 UG/ML
1000 INJECTION, SOLUTION INTRAMUSCULAR; SUBCUTANEOUS
Qty: 1 ML | Refills: 5 | Status: SHIPPED | OUTPATIENT
Start: 2025-03-25

## 2025-04-01 DIAGNOSIS — R79.89 LOW VITAMIN B12 LEVEL: Primary | ICD-10-CM

## 2025-04-01 RX ORDER — NEEDLES, SAFETY 18GX1 1/2"
1 NEEDLE, DISPOSABLE MISCELLANEOUS
Qty: 50 EACH | Refills: 1 | Status: SHIPPED | OUTPATIENT
Start: 2025-04-01

## 2025-04-02 PROBLEM — Z00.00 ENCOUNTER FOR WELL ADULT EXAM WITHOUT ABNORMAL FINDINGS: Status: RESOLVED | Noted: 2023-02-22 | Resolved: 2025-04-02

## 2025-07-20 DIAGNOSIS — M79.7 FIBROMYALGIA: ICD-10-CM

## 2025-08-25 DIAGNOSIS — E78.2 MIXED HYPERLIPIDEMIA: ICD-10-CM

## 2025-08-25 DIAGNOSIS — M79.2 NEUROPATHIC PAIN: ICD-10-CM

## 2025-08-25 DIAGNOSIS — F41.1 GENERALIZED ANXIETY DISORDER: ICD-10-CM

## 2025-08-25 RX ORDER — PRAVASTATIN SODIUM 10 MG
10 TABLET ORAL
Qty: 30 TABLET | Refills: 5 | Status: SHIPPED | OUTPATIENT
Start: 2025-08-25

## 2025-08-25 RX ORDER — DULOXETIN HYDROCHLORIDE 60 MG/1
CAPSULE, DELAYED RELEASE ORAL
Qty: 30 CAPSULE | Refills: 5 | Status: SHIPPED | OUTPATIENT
Start: 2025-08-25

## (undated) DEVICE — INDICATED FOR USE DURING OPEN AND LAPAROSCOPIC CHOLECYSTECTOMY PROCEDURES TO INJECT RADIOPAQUE MEDIA THROUGH THE CYSTIC DUCT INTO THE BILIARY TREE.: Brand: AEROSTAT®

## (undated) DEVICE — SUTURE V-LOC 90 3-0 L6IN ABSRB UD CV-23 L17MM 1/2 CIR VLOCM1904

## (undated) DEVICE — SPONGE LAP W18XL18IN WHT COT 4 PLY FLD STRUNG RADPQ DISP ST

## (undated) DEVICE — LAPAROSCOPIC TROCAR SLEEVE/SINGLE USE: Brand: KII® LOW PROFILE OPTICAL ACCESS SYSTEM

## (undated) DEVICE — CORD ES L10FT MPLR LAP

## (undated) DEVICE — APPLICATOR MEDICATED 26 CC SOLUTION HI LT ORNG CHLORAPREP

## (undated) DEVICE — ADHESIVE SKIN CLSR 0.7ML TOP DERMBND ADV

## (undated) DEVICE — SUTURE PERMA-HAND 0 L18IN NONABSORBABLE BLK SILK BRAID W/O SA66G

## (undated) DEVICE — TOTAL TRAY, DB, 100% SILI FOLEY, 16FR 10: Brand: MEDLINE

## (undated) DEVICE — SOLUTION IV IRRIG WATER 1000ML POUR BRL 2F7114

## (undated) DEVICE — TIP COVER ACCESSORY

## (undated) DEVICE — MERCY FAIRFIELD TURNOVER KIT: Brand: MEDLINE INDUSTRIES, INC.

## (undated) DEVICE — PMI DISPOSABLE PUNCTURE CLOSURE DEVICE / SUTURE GRASPER: Brand: PMI

## (undated) DEVICE — SUTURE VLOC 90 2/0 VL 6 GS-22 VLOCM2105

## (undated) DEVICE — SOLUTION IRRIG 1000ML 0.9% SOD CHL USP POUR PLAS BTL

## (undated) DEVICE — MASC TURNOVER KIT: Brand: MEDLINE INDUSTRIES, INC.

## (undated) DEVICE — GOWN SIRUS NONREIN LG W/TWL: Brand: MEDLINE INDUSTRIES, INC.

## (undated) DEVICE — LAPAROSCOPY PACK: Brand: MEDLINE INDUSTRIES, INC.

## (undated) DEVICE — TRI-LUMEN FILTERED TUBE SET WITH ACTIVATED CHARCOAL FILTER: Brand: AIRSEAL

## (undated) DEVICE — TROCAR SLEEVE: Brand: KII ® LOW PROFILE SLEEVE

## (undated) DEVICE — SUTURE ETHBND EXCEL SZ 0 L18IN NONABSORBABLE GRN L26MM CT-2 CX27D

## (undated) DEVICE — 30977 SEE SHARP - ENHANCED INTRAOPERATIVE LAPAROSCOPE CLEANING & DEFOGGING: Brand: 30977 SEE SHARP - ENHANCED INTRAOPERATIVE LAPAROSCOPE CLEANING & DEFOGGING

## (undated) DEVICE — SOLUTION IRRIG 500ML 0.9% SOD CHL USP POUR PLAS BTL

## (undated) DEVICE — RENTAL PROBE PRO ART ROBTIC ULTRASOUND

## (undated) DEVICE — SUTURE MCRYL + SZ 4-0 L27IN ABSRB UD L19MM PS-2 3/8 CIR MCP426H

## (undated) DEVICE — 3M™ STERI-DRAPE™ INCISE DRAPE 1050 (60CM X 45CM): Brand: STERI-DRAPE™

## (undated) DEVICE — TROCAR LAP L100MM DIA5MM BLDELSS W/ STBL SL ENDOPATH XCEL

## (undated) DEVICE — LIGHT HANDLE: Brand: DEVON

## (undated) DEVICE — ARM DRAPE

## (undated) DEVICE — INSUFFLATION NEEDLE TO ESTABLISH PNEUMOPERITONEUM.: Brand: INSUFFLATION NEEDLE

## (undated) DEVICE — AIRSEAL 12 MM ACCESS PORT AND PALM GRIP OBTURATOR WITH BLADELESS OPTICAL TIP, 120 MM LENGTH: Brand: AIRSEAL

## (undated) DEVICE — Device

## (undated) DEVICE — APPLIER CLP M/L SHFT DIA5MM 15 LIG LIGAMAX 5

## (undated) DEVICE — CLIP INT M L POLYMER LOK LIG HEM O LOK

## (undated) DEVICE — C-ARM: Brand: UNBRANDED

## (undated) DEVICE — GLOVE SURG SZ 6.5 L11.2IN FNGR THK9.8MIL STRW LTX POLYMER

## (undated) DEVICE — MARQUEE® DISPOSABLE CORE BIOPSY INSTRUMENT, 18G X 20CM: Brand: MARQUEE

## (undated) DEVICE — TISSUE RETRIEVAL SYSTEM: Brand: INZII RETRIEVAL SYSTEM

## (undated) DEVICE — REDUCER: Brand: ENDOWRIST

## (undated) DEVICE — STERILE LATEX POWDER FREE SURGICAL GLOVES WITH HYDROGEL COATING: Brand: PROTEXIS

## (undated) DEVICE — PENCIL ES L3M BTTN SWCH S STL HEX LOK BLDE ELECTRD HOLSTER

## (undated) DEVICE — BLADELESS OBTURATOR: Brand: WECK VISTA

## (undated) DEVICE — MEDIUM-LARGE CLIP APPLIER: Brand: ENDOWRIST

## (undated) DEVICE — SYRINGE, LUER LOCK, 30ML: Brand: MEDLINE

## (undated) DEVICE — ROBOTIC PK

## (undated) DEVICE — HYPODERMIC SAFETY NEEDLE: Brand: MAGELLAN

## (undated) DEVICE — PROTECTOR EYE PT SELF ADH NS OPT GRD LF

## (undated) DEVICE — CANNULA SEAL

## (undated) DEVICE — DRAPE,LAP,CHOLE,W/TROUGHS,STERILE: Brand: MEDLINE

## (undated) DEVICE — TROCAR: Brand: KII FIOS FIRST ENTRY